# Patient Record
Sex: FEMALE | Race: WHITE | NOT HISPANIC OR LATINO | Employment: OTHER | URBAN - METROPOLITAN AREA
[De-identification: names, ages, dates, MRNs, and addresses within clinical notes are randomized per-mention and may not be internally consistent; named-entity substitution may affect disease eponyms.]

---

## 2017-02-01 RX ORDER — AMLODIPINE BESYLATE 5 MG/1
TABLET ORAL
Qty: 90 TABLET | Refills: 2 | Status: SHIPPED | OUTPATIENT
Start: 2017-02-01 | End: 2017-11-14 | Stop reason: SDUPTHER

## 2017-03-28 ENCOUNTER — TELEPHONE (OUTPATIENT)
Dept: SURGERY | Facility: CLINIC | Age: 62
End: 2017-03-28

## 2017-03-28 NOTE — TELEPHONE ENCOUNTER
Returned call to pt regarding F/U appointment, pt had imaging at [facility], pt advised to bring images on a disc with reports to appointment, appt scheduled per pt request, all questions answered at this time

## 2017-04-20 ENCOUNTER — HOSPITAL ENCOUNTER (OUTPATIENT)
Dept: RADIOLOGY | Facility: HOSPITAL | Age: 62
Discharge: HOME | End: 2017-04-20
Attending: SURGERY

## 2017-04-20 ENCOUNTER — OFFICE VISIT (OUTPATIENT)
Dept: SURGERY | Facility: CLINIC | Age: 62
End: 2017-04-20
Payer: MEDICARE

## 2017-04-20 VITALS
HEART RATE: 69 BPM | BODY MASS INDEX: 47.09 KG/M2 | SYSTOLIC BLOOD PRESSURE: 172 MMHG | DIASTOLIC BLOOD PRESSURE: 93 MMHG | HEIGHT: 66 IN | WEIGHT: 293 LBS | TEMPERATURE: 98 F

## 2017-04-20 DIAGNOSIS — R92.8 ABNORMAL MAMMOGRAM: Primary | ICD-10-CM

## 2017-04-20 PROCEDURE — 99999 PR PBB SHADOW E&M-EST. PATIENT-LVL III: CPT | Mod: PBBFAC,,, | Performed by: SURGERY

## 2017-04-20 PROCEDURE — 99213 OFFICE O/P EST LOW 20 MIN: CPT | Mod: PBBFAC,PO | Performed by: SURGERY

## 2017-04-20 PROCEDURE — 99213 OFFICE O/P EST LOW 20 MIN: CPT | Mod: S$PBB,,, | Performed by: SURGERY

## 2017-04-20 NOTE — PROGRESS NOTES
"Date of Service: 04/20/2017    HISTORY OF PRESENT ILLNESS:   Joy Singer is a 61 y.o. female initially seen 11/22/2016 for abnormal bilateral mammogram. She was recommended to pursue MRI, US, and genetics counseling; however, she reports that she returned to her original breast surgeon for further follow up. She now wishes to reestablish care her at [facility]. She denies skin change, nipple discharge, pain, or new breast lumps. She reports occasional burning sensation of her left lateral breast. An ultrasound in 12/2016 recommended follow up in 6 months.       MEDICATIONS/ALLERGIES:     Review of patient's allergies indicates:   Allergen Reactions    Percocet [oxycodone-acetaminophen] Itching       PHYSICAL EXAM:   BP (!) 172/93 (BP Location: Right arm, Patient Position: Sitting, BP Method: Automatic)  Pulse 69  Temp 98.3 °F (36.8 °C) (Oral)   Ht 5' 6" (1.676 m)  Wt (!) 170.6 kg (376 lb)  BMI 60.69 kg/m2  General: The patient appears well and is in no acute distress.   Neuro: Alert & oriented x3. HEENT: PERRLA, EOMI, sclerae nonicteric. Mucous membranes moist.   Cardiovascular: RRR, no g/r/m.  Breasts: The exam was done with the patient seated and supine. Left breast - within normal limits. No palpable masses and no abnormal skin or nipple findings. No supraclavicular or axillary lymphadenopathy on the left side.   Right breast - within normal limits. No palpable masses and no abnormal skin or nipple findings. No supraclavicular or axillary lymphadenopathy on the right side.  Pulmonary: clear to auscultation bilaterally   Abdomen: soft, nontender, nondistended. No masses.    Extremities: No clubbing or cyanosis. Bilateral full arm range of motion    ASSESSMENT:   This is a 61 y.o. female with history of abnormal mammogram who wishes to establish care     PLAN:     Images given to Radiology to interpret. If concerning abnormality noted, will plan for further workup. Otherwise, plan to follow up in June with " repeat imaging.

## 2017-04-24 DIAGNOSIS — N63.20 LEFT BREAST MASS: Primary | ICD-10-CM

## 2017-04-26 ENCOUNTER — TELEPHONE (OUTPATIENT)
Dept: SURGERY | Facility: CLINIC | Age: 62
End: 2017-04-26

## 2017-04-26 NOTE — TELEPHONE ENCOUNTER
Spoke with pt regarding outside interpretation of breast imaging and radiologist recommendations, pt scheduled for left mammogram and bilateral ultrasound at this time, appointment confirmed, all questions answered at this time

## 2017-04-27 ENCOUNTER — HOSPITAL ENCOUNTER (OUTPATIENT)
Dept: RADIOLOGY | Facility: HOSPITAL | Age: 62
Discharge: HOME | End: 2017-04-27
Attending: SURGERY
Payer: MEDICARE

## 2017-04-27 DIAGNOSIS — N63.10 BREAST MASS, RIGHT: ICD-10-CM

## 2017-04-27 DIAGNOSIS — N63.20 LEFT BREAST MASS: ICD-10-CM

## 2017-04-27 DIAGNOSIS — R92.8 ABNORMAL MAMMOGRAM: ICD-10-CM

## 2017-04-27 PROCEDURE — 76642 ULTRASOUND BREAST LIMITED: CPT | Mod: 26,RT,, | Performed by: RADIOLOGY

## 2017-04-27 PROCEDURE — 77061 BREAST TOMOSYNTHESIS UNI: CPT | Mod: 26,LT,, | Performed by: RADIOLOGY

## 2017-04-27 PROCEDURE — 76642 ULTRASOUND BREAST LIMITED: CPT | Mod: TC,RT

## 2017-04-27 PROCEDURE — 77065 DX MAMMO INCL CAD UNI: CPT | Mod: 26,LT,, | Performed by: RADIOLOGY

## 2017-04-27 PROCEDURE — 77061 BREAST TOMOSYNTHESIS UNI: CPT | Mod: TC,LT

## 2017-05-29 ENCOUNTER — OFFICE VISIT (OUTPATIENT)
Dept: OPTOMETRY | Facility: CLINIC | Age: 62
End: 2017-05-29
Payer: MEDICARE

## 2017-05-29 DIAGNOSIS — H10.829 ROSACEA CONJUNCTIVITIS: Primary | ICD-10-CM

## 2017-05-29 DIAGNOSIS — L71.8 ROSACEA CONJUNCTIVITIS: Primary | ICD-10-CM

## 2017-05-29 PROCEDURE — 99999 PR PBB SHADOW E&M-EST. PATIENT-LVL II: CPT | Mod: PBBFAC,,, | Performed by: OPTOMETRIST

## 2017-05-29 PROCEDURE — 92004 COMPRE OPH EXAM NEW PT 1/>: CPT | Mod: S$PBB,,, | Performed by: OPTOMETRIST

## 2017-05-29 PROCEDURE — 99212 OFFICE O/P EST SF 10 MIN: CPT | Mod: PBBFAC,PO | Performed by: OPTOMETRIST

## 2017-05-29 RX ORDER — BENZONATATE 200 MG/1
CAPSULE ORAL
Refills: 1 | COMMUNITY
Start: 2017-04-27 | End: 2017-10-05 | Stop reason: ALTCHOICE

## 2017-05-29 RX ORDER — FLUOROMETHOLONE 1 MG/ML
1 SUSPENSION/ DROPS OPHTHALMIC 4 TIMES DAILY
Qty: 5 ML | Refills: 1 | Status: SHIPPED | OUTPATIENT
Start: 2017-05-29 | End: 2017-06-08

## 2017-05-29 NOTE — PROGRESS NOTES
HPI     Pt states left eye is swelling and painful x 2 days. Pt states left eye   is sensitive to touch. Denies itching, redness, and discharge.   States va is stable no noticeable changes    No gtts       Last edited by Mary Cash on 5/29/2017 10:44 AM. (History)        ROS     Positive for: Neurological, Cardiovascular, Eyes, Respiratory    Negative for: Constitutional, Gastrointestinal, Skin, Genitourinary,   Musculoskeletal, HENT, Endocrine, Psychiatric, Allergic/Imm, Heme/Lymph    Last edited by Jareth Hernandez, OD on 5/29/2017 10:52 AM. (History)        Assessment /Plan     For exam results, see Encounter Report.    Rosacea conjunctivitis  -     fluorometholone 0.1% (FML) 0.1 % DrpS; Place 1 drop into the left eye 4 (four) times daily.  Dispense: 5 mL; Refill: 1      1. Ros conj OS (pt also uses CPAP for LIZETH)  2. Small Cats OU  3. Pseudotumor hx--on DIAMOX.  ONH wnl OU today    PLAN:    1. FML QID OS  2. SYS BAL or SOOTHE XP QID OD  3. rtc 1 week for FU.  If better will taper FML, switch to ATs OU, and discuss lid scrubs

## 2017-06-07 ENCOUNTER — OFFICE VISIT (OUTPATIENT)
Dept: OPTOMETRY | Facility: CLINIC | Age: 62
End: 2017-06-07
Payer: MEDICARE

## 2017-06-07 DIAGNOSIS — L71.8 ROSACEA CONJUNCTIVITIS: Primary | ICD-10-CM

## 2017-06-07 DIAGNOSIS — H10.829 ROSACEA CONJUNCTIVITIS: Primary | ICD-10-CM

## 2017-06-07 PROCEDURE — 99212 OFFICE O/P EST SF 10 MIN: CPT | Mod: PBBFAC,PO | Performed by: OPTOMETRIST

## 2017-06-07 PROCEDURE — 99999 PR PBB SHADOW E&M-EST. PATIENT-LVL II: CPT | Mod: PBBFAC,,, | Performed by: OPTOMETRIST

## 2017-06-07 PROCEDURE — 92012 INTRM OPH EXAM EST PATIENT: CPT | Mod: S$PBB,,, | Performed by: OPTOMETRIST

## 2017-06-07 NOTE — PROGRESS NOTES
HPI     DLS: 5/29/2017  Pt states very little swelling  Denies pain  Va is stable    FML os QID   Soothe od QID     Last edited by Mary Cash on 6/7/2017  8:37 AM. (History)        ROS     Positive for: Neurological, Cardiovascular, Eyes, Respiratory    Negative for: Constitutional, Gastrointestinal, Skin, Genitourinary,   Musculoskeletal, HENT, Endocrine, Psychiatric, Allergic/Imm, Heme/Lymph    Last edited by Jareth Henrandez, OD on 6/7/2017 11:35 AM. (History)        Assessment /Plan     For exam results, see Encounter Report.    Rosacea conjunctivitis      1. Ros conj OS (pt also uses CPAP for LIZETH)--resolved on meds  2. Small Cats OU  3. Pseudotumor hx--on DIAMOX.  ONH wnl OU today    PLAN:    1. Taper FML: BID OS X 1 week, then qAM X 1 week, then DC  2. Start SYS BAL or SOOTHE XP QID OU andDaily OCUSOFT lid scrubs  3. Pt gets quarterly eye exams from her ophth to monitor for PTC>  Will keep those appts as sched.  Will call me if rosacea sx recur on tx

## 2017-07-24 DIAGNOSIS — I10 ESSENTIAL HYPERTENSION: Primary | ICD-10-CM

## 2017-07-24 DIAGNOSIS — E78.00 PURE HYPERCHOLESTEROLEMIA: ICD-10-CM

## 2017-08-25 DIAGNOSIS — Z12.11 COLON CANCER SCREENING: ICD-10-CM

## 2017-10-02 ENCOUNTER — LAB VISIT (OUTPATIENT)
Dept: LAB | Facility: HOSPITAL | Age: 62
End: 2017-10-02
Attending: INTERNAL MEDICINE
Payer: MEDICARE

## 2017-10-02 DIAGNOSIS — I10 ESSENTIAL HYPERTENSION: ICD-10-CM

## 2017-10-02 DIAGNOSIS — E78.00 PURE HYPERCHOLESTEROLEMIA: ICD-10-CM

## 2017-10-02 LAB
ALT SERPL W/O P-5'-P-CCNC: 17 U/L
ANION GAP SERPL CALC-SCNC: 9 MMOL/L
AST SERPL-CCNC: 20 U/L
BUN SERPL-MCNC: 15 MG/DL
CALCIUM SERPL-MCNC: 9.5 MG/DL
CHLORIDE SERPL-SCNC: 111 MMOL/L
CHOLEST SERPL-MCNC: 164 MG/DL
CHOLEST/HDLC SERPL: 4.2 {RATIO}
CO2 SERPL-SCNC: 23 MMOL/L
CREAT SERPL-MCNC: 0.9 MG/DL
EST. GFR  (AFRICAN AMERICAN): >60 ML/MIN/1.73 M^2
EST. GFR  (NON AFRICAN AMERICAN): >60 ML/MIN/1.73 M^2
GLUCOSE SERPL-MCNC: 114 MG/DL
HDLC SERPL-MCNC: 39 MG/DL
HDLC SERPL: 23.8 %
LDLC SERPL CALC-MCNC: 99.2 MG/DL
NONHDLC SERPL-MCNC: 125 MG/DL
POTASSIUM SERPL-SCNC: 3.8 MMOL/L
SODIUM SERPL-SCNC: 143 MMOL/L
TRIGL SERPL-MCNC: 129 MG/DL

## 2017-10-02 PROCEDURE — 80048 BASIC METABOLIC PNL TOTAL CA: CPT

## 2017-10-02 PROCEDURE — 84460 ALANINE AMINO (ALT) (SGPT): CPT

## 2017-10-02 PROCEDURE — 36415 COLL VENOUS BLD VENIPUNCTURE: CPT | Mod: PO

## 2017-10-02 PROCEDURE — 80061 LIPID PANEL: CPT

## 2017-10-02 PROCEDURE — 84450 TRANSFERASE (AST) (SGOT): CPT

## 2017-10-05 ENCOUNTER — OFFICE VISIT (OUTPATIENT)
Dept: CARDIOLOGY | Facility: CLINIC | Age: 62
End: 2017-10-05
Payer: MEDICARE

## 2017-10-05 VITALS
BODY MASS INDEX: 47.09 KG/M2 | HEIGHT: 66 IN | SYSTOLIC BLOOD PRESSURE: 132 MMHG | WEIGHT: 293 LBS | HEART RATE: 66 BPM | DIASTOLIC BLOOD PRESSURE: 74 MMHG

## 2017-10-05 DIAGNOSIS — G47.33 OSA (OBSTRUCTIVE SLEEP APNEA): ICD-10-CM

## 2017-10-05 DIAGNOSIS — I10 ESSENTIAL HYPERTENSION: Primary | ICD-10-CM

## 2017-10-05 DIAGNOSIS — E78.00 PURE HYPERCHOLESTEROLEMIA: ICD-10-CM

## 2017-10-05 DIAGNOSIS — G93.2 PSEUDOTUMOR CEREBRI: ICD-10-CM

## 2017-10-05 DIAGNOSIS — E66.01 MORBID OBESITY WITH BODY MASS INDEX (BMI) OF 60.0 TO 69.9 IN ADULT: ICD-10-CM

## 2017-10-05 PROCEDURE — 99213 OFFICE O/P EST LOW 20 MIN: CPT | Mod: PBBFAC,PO | Performed by: INTERNAL MEDICINE

## 2017-10-05 PROCEDURE — 99999 PR PBB SHADOW E&M-EST. PATIENT-LVL III: CPT | Mod: PBBFAC,,, | Performed by: INTERNAL MEDICINE

## 2017-10-05 PROCEDURE — 99214 OFFICE O/P EST MOD 30 MIN: CPT | Mod: S$PBB,,, | Performed by: INTERNAL MEDICINE

## 2017-10-05 RX ORDER — PHENYLEPHRINE HCL 10 MG/1
10 TABLET, FILM COATED ORAL EVERY 4 HOURS PRN
COMMUNITY
End: 2018-05-15 | Stop reason: SINTOL

## 2017-10-05 RX ORDER — DIPHENHYDRAMINE HCL 25 MG
25 CAPSULE ORAL EVERY 6 HOURS PRN
COMMUNITY

## 2017-10-05 RX ORDER — NEOMYCIN SULFATE, POLYMYXIN B SULFATE AND HYDROCORTISONE 10; 3.5; 1 MG/ML; MG/ML; [USP'U]/ML
SUSPENSION/ DROPS AURICULAR (OTIC)
COMMUNITY
Start: 2017-10-03 | End: 2018-05-15 | Stop reason: CLARIF

## 2017-10-05 RX ORDER — PNV NO.95/FERROUS FUM/FOLIC AC 28MG-0.8MG
1000 TABLET ORAL DAILY
COMMUNITY

## 2017-10-05 NOTE — PATIENT INSTRUCTIONS
Consider gastric sleeve.  Call us to schedule a stress test if you decide to go ahead with surgery

## 2017-10-05 NOTE — PROGRESS NOTES
Subjective:   Patient ID:  Joy Singer is a 62 y.o. female who presents for follow-up of Shortness of Breath      Problem List:  HTN  Sleep apnea  Pseudotumor cerebri  BMI ~60    HPI:   Joy Singer reports that she gets short of breath easily - breathless if walking and talking at the same time. She is significantly obese. She started water aerobics recently. She had been swimming off and on. She had lost weight on [employer] in the past and rejoined 3 weeks ago.  and daughter started [employer] with her.   She had chest discomfort in 11/17 and went to the ER at . She was told that it was most likely musculoskeletal. She has back pain and was unable to turn easily in bed. She used to grab a rail on the head end of her bed with her arm to turn.   She has LIZETH. She started using CPAP 2-3 years ago.  On pravastatin 40 mg for hypercholesterolemia. LDL is ~100. Hepatic transaminases are within normal limits.   She takes acetazolamide for pseudotumor cerebri.       Review of Systems   Constitution: Positive for weight loss. Negative for weakness, malaise/fatigue and weight gain.   Cardiovascular: Positive for chest pain and dyspnea on exertion. Negative for claudication, irregular heartbeat, leg swelling, near-syncope, palpitations and syncope.   Respiratory: Negative for cough, hemoptysis, sputum production and wheezing.    Hematologic/Lymphatic: Does not bruise/bleed easily.   Musculoskeletal: Positive for arthritis, back pain and muscle cramps. Negative for joint pain, joint swelling and muscle weakness.   Gastrointestinal: Positive for heartburn (very seldom). Negative for abdominal pain, change in bowel habit and melena.   Genitourinary: Negative for frequency, hematuria and nocturia.   Neurological: Positive for headaches, numbness and paresthesias. Negative for dizziness, light-headedness and loss of balance.   Psychiatric/Behavioral: Negative for depression. The patient is not  nervous/anxious.        Current Outpatient Prescriptions   Medication Sig    acetaZOLAMIDE (DIAMOX) 250 MG tablet Take 250 mg by mouth 2 (two) times daily.    amlodipine (NORVASC) 5 MG tablet TAKE 1 TABLET DAILY    ascorbic acid (VITAMIN C WITH MARCOS HIPS) 500 mg TbSR Take by mouth once daily.    aspirin (ECOTRIN) 81 MG EC tablet Take 81 mg by mouth once daily.      aspirin-acetaminophen-caffeine 250-250-65 mg (EXCEDRIN EXTRA STRENGTH) 250-250-65 mg per tablet Take 1 tablet by mouth every 6 (six) hours as needed for Pain.    aspirin-caffeine (ANACIN) 400-32 mg Tab Take by mouth daily as needed.    calcium carbonate (OS-RUBÉN) 600 mg (1,500 mg) Tab Take 600 mg by mouth 2 (two) times daily with meals.      carisoprodol (SOMA) 350 MG tablet Take 350 mg by mouth continuous prn.      diphenhydrAMINE (BENADRYL) 25 mg capsule Take 25 mg by mouth every 6 (six) hours as needed for Itching.    docusate sodium (COLACE) 100 MG capsule Take 100 mg by mouth 2 (two) times daily.      hydrocodone-acetaminophen 7.5-325mg (NORCO) 7.5-325 mg per tablet TK 1 T PO Q 6 TO 8 H PRN    ketoconazole (EXTINA) 2 % Foam Apply topically continuous prn.      ketoconazole (NIZORAL) 2 % shampoo Apply topically twice a week.      MULTIVITAMIN W-MINERALS/LUTEIN (CENTRUM SILVER ORAL) Take by mouth.      neomycin-polymyxin-hydrocortisone (CORTISPORIN) 3.5-10,000-1 mg/mL-unit/mL-% otic suspension     phenylephrine (SUDAFED PE) 10 MG Tab Take 10 mg by mouth every 4 (four) hours as needed.    polyethylene glycol (GLYCOLAX) 17 gram/dose powder daily as needed.     pravastatin (PRAVACHOL) 40 MG tablet Take 1 tablet (40 mg total) by mouth once daily.    vitamin E 1000 UNIT capsule Take 1,000 Units by mouth once daily.    ibuprofen (ADVIL,MOTRIN) 600 MG tablet Take 600 mg by mouth every 8 (eight) hours as needed for Pain.         Social History   Substance Use Topics    Smoking status: Never Smoker    Smokeless tobacco: Never Used     "Alcohol use Yes      Comment: social         Objective:     Physical Exam   Constitutional: She is oriented to person, place, and time. She appears well-developed and well-nourished.   /74   Pulse 66   Ht 5' 6" (1.676 m)   Wt (!) 167.8 kg (369 lb 13.2 oz)   BMI 59.69 kg/m²      HENT:   Head: Normocephalic.   Neck: No JVD present. Carotid bruit is not present.   Cardiovascular: Normal rate, regular rhythm, S1 normal and S2 normal.  Exam reveals no gallop.    No murmur heard.  Pulses:       Posterior tibial pulses are 2+ on the right side, and 2+ on the left side.   Pulmonary/Chest: Effort normal. She has no wheezes. She has no rales. Chest wall is not dull to percussion.   Abdominal: Soft. She exhibits no abdominal bruit. There is no splenomegaly or hepatomegaly. There is no tenderness.   Musculoskeletal:        Right lower leg: She exhibits no edema.        Left lower leg: She exhibits no edema.   Neurological: She is alert and oriented to person, place, and time. Gait normal.   Skin: Skin is warm and dry. No bruising and no rash noted. No cyanosis. Nails show no clubbing.   Psychiatric: She has a normal mood and affect. Her speech is normal and behavior is normal. Judgment normal. Cognition and memory are normal.         Lab Results   Component Value Date    CHOL 164 10/02/2017    HDL 39 (L) 10/02/2017    LDLCALC 99.2 10/02/2017    TRIG 129 10/02/2017    CHOLHDL 23.8 10/02/2017     Lab Results   Component Value Date     (H) 10/02/2017    CREATININE 0.9 10/02/2017    BUN 15 10/02/2017     10/02/2017    K 3.8 10/02/2017     (H) 10/02/2017    CO2 23 10/02/2017     Lab Results   Component Value Date    ALT 17 10/02/2017    AST 20 10/02/2017    ALKPHOS 60 11/02/2016    BILITOT 0.3 11/02/2016           Assessment and Plan:     1. Essential hypertension    2. Pure hypercholesterolemia    3. LIZETH (obstructive sleep apnea)    4. Pseudotumor cerebri    5. Morbid obesity with body mass index (BMI) " of 60.0 to 69.9 in adult             Essential hypertension  -     2D Echo w/ Color Flow Doppler; Future    Pure hypercholesterolemia   Continue pravastatin.    LIZETH (obstructive sleep apnea)  -     Continue CPAP.    Pseudotumor cerebri  -     R/O Acromegaly. Ambulatory consult to Neurosurgery    Morbid obesity with body mass index (BMI) of 60.0 to 69.9 in adult   Recommend bariatric surgery     RTC 1 year

## 2017-11-14 ENCOUNTER — PATIENT MESSAGE (OUTPATIENT)
Dept: CARDIOLOGY | Facility: CLINIC | Age: 62
End: 2017-11-14

## 2017-11-14 RX ORDER — PRAVASTATIN SODIUM 40 MG/1
TABLET ORAL
Qty: 90 TABLET | Refills: 3 | Status: SHIPPED | OUTPATIENT
Start: 2017-11-14 | End: 2018-11-06 | Stop reason: SDUPTHER

## 2017-11-14 RX ORDER — AMLODIPINE BESYLATE 5 MG/1
5 TABLET ORAL DAILY
Qty: 90 TABLET | Refills: 3 | Status: SHIPPED | OUTPATIENT
Start: 2017-11-14 | End: 2018-11-26 | Stop reason: SDUPTHER

## 2017-12-18 ENCOUNTER — OFFICE VISIT (OUTPATIENT)
Dept: SURGERY | Facility: CLINIC | Age: 62
End: 2017-12-18
Payer: MEDICARE

## 2017-12-18 ENCOUNTER — HOSPITAL ENCOUNTER (OUTPATIENT)
Dept: RADIOLOGY | Facility: HOSPITAL | Age: 62
Discharge: HOME | End: 2017-12-18
Attending: SURGERY
Payer: MEDICARE

## 2017-12-18 VITALS
TEMPERATURE: 98 F | HEIGHT: 66 IN | DIASTOLIC BLOOD PRESSURE: 75 MMHG | BODY MASS INDEX: 47.09 KG/M2 | HEART RATE: 51 BPM | WEIGHT: 293 LBS | SYSTOLIC BLOOD PRESSURE: 134 MMHG | WEIGHT: 293 LBS | HEIGHT: 66 IN | BODY MASS INDEX: 47.09 KG/M2

## 2017-12-18 DIAGNOSIS — R92.8 ABNORMAL MAMMOGRAM: ICD-10-CM

## 2017-12-18 DIAGNOSIS — Z12.39 SCREENING FOR BREAST CANCER: Primary | ICD-10-CM

## 2017-12-18 PROCEDURE — 77066 DX MAMMO INCL CAD BI: CPT | Mod: TC,PO

## 2017-12-18 PROCEDURE — 77066 DX MAMMO INCL CAD BI: CPT | Mod: 26,,, | Performed by: RADIOLOGY

## 2017-12-18 PROCEDURE — 99999 PR PBB SHADOW E&M-EST. PATIENT-LVL III: CPT | Mod: PBBFAC,,, | Performed by: SURGERY

## 2017-12-18 PROCEDURE — 99213 OFFICE O/P EST LOW 20 MIN: CPT | Mod: S$PBB,,, | Performed by: SURGERY

## 2017-12-18 PROCEDURE — 77062 BREAST TOMOSYNTHESIS BI: CPT | Mod: 26,,, | Performed by: RADIOLOGY

## 2017-12-18 PROCEDURE — 99213 OFFICE O/P EST LOW 20 MIN: CPT | Mod: PBBFAC,PO | Performed by: SURGERY

## 2017-12-24 NOTE — PROGRESS NOTES
"Date of Service: 12/18/2017    HISTORY OF PRESENT ILLNESS:   Joy Singer is a 62 y.o. female initially seen 11/22/2016 for abnormal bilateral mammogram. She comes in for regular followup and breast exam.  Denies any new complaints.     MEDICATIONS/ALLERGIES:     Review of patient's allergies indicates:   Allergen Reactions    Percocet [oxycodone-acetaminophen] Itching       PHYSICAL EXAM:   /75 (BP Location: Right arm, Patient Position: Sitting, BP Method: Large (Automatic))   Pulse (!) 51   Temp 98.2 °F (36.8 °C) (Oral)   Ht 5' 6" (1.676 m)   Wt (!) 170.1 kg (375 lb)   BMI 60.53 kg/m²   General: The patient appears well and is in no acute distress.   Neuro: Alert & oriented x3. HEENT: PERRLA, EOMI, sclerae nonicteric. Mucous membranes moist.   Cardiovascular: RRR, no g/r/m.  Breasts: The exam was done with the patient seated and supine. Left breast - within normal limits. No palpable masses and no abnormal skin or nipple findings. No supraclavicular or axillary lymphadenopathy on the left side.   Right breast - within normal limits. No palpable masses and no abnormal skin or nipple findings. No supraclavicular or axillary lymphadenopathy on the right side.  Pulmonary: clear to auscultation bilaterally   Abdomen: soft, nontender, nondistended. No masses.    Extremities: No clubbing or cyanosis. Bilateral full arm range of motion    ASSESSMENT:   This is a 62 y.o. female with history of abnormal mammogram here for f/u   PLAN:   MMG negative BIRADS 2  F/u 1 year for CBE per patient wishes    "

## 2018-01-23 ENCOUNTER — OFFICE VISIT (OUTPATIENT)
Dept: SLEEP MEDICINE | Facility: CLINIC | Age: 63
End: 2018-01-23
Payer: MEDICARE

## 2018-01-23 VITALS
BODY MASS INDEX: 47.09 KG/M2 | HEART RATE: 67 BPM | DIASTOLIC BLOOD PRESSURE: 81 MMHG | SYSTOLIC BLOOD PRESSURE: 123 MMHG | WEIGHT: 293 LBS | HEIGHT: 66 IN

## 2018-01-23 DIAGNOSIS — G47.33 OSA (OBSTRUCTIVE SLEEP APNEA): Primary | ICD-10-CM

## 2018-01-23 PROCEDURE — 99999 PR PBB SHADOW E&M-EST. PATIENT-LVL III: CPT | Mod: PBBFAC,,, | Performed by: PSYCHIATRY & NEUROLOGY

## 2018-01-23 PROCEDURE — 99213 OFFICE O/P EST LOW 20 MIN: CPT | Mod: PBBFAC | Performed by: PSYCHIATRY & NEUROLOGY

## 2018-01-23 PROCEDURE — 99213 OFFICE O/P EST LOW 20 MIN: CPT | Mod: S$PBB,,, | Performed by: PSYCHIATRY & NEUROLOGY

## 2018-01-23 NOTE — PROGRESS NOTES
This 62 y.o. female patient returns for the management of obstructive sleep apnea. Referred by Dr. Hermosillo (cardiology) and Dr. Jerome, PMR, who is managing headaches.    The patient stated her previous DME does not accept her insurance. She is requesting for an order and her insurance to be sent to the DME provided below. She already confirmed that they accept her insurance.     [facility]   [phone]    Patient using CPAP nightly. She reports sleeping longer with CPAP  Snoring cessation  Some teeth grinding, some lingual protrusion at night, which is causing lower teeth shift per her dentist  Dry mouth not occuring with CPAP  Using a Wisp mask.    ESS = 3    CPAP interrogation: Auto CPAP set at 14-20 cm; 90% tile is 15.6; AHI <1; leak 1%; 7:36 hours/night 30-day; 30/30 >4 hours;     Home Sleep Study: 12/11/14: +LIZETH, AHI 15, %time <90% = 9.9%    PRIOR SLEEP HISTORY:   reports that she has intermittent breathing interruption in her sleep  Snoring.  Wakes up with dry mouth - which gives headaches  Bathroom every 2 hours.  Groggy in morning  Morning headaches after mouth breathing  ESS = 3  Denies limb kicking or RLS.  Left arm pain - contributes to sleep disturbances  Soma and Vicodin for back pain; knee pain    SLEEP ROUTINE:   Bed partner:    Time to bed: MN   Sleep onset latency: 10-15 mins   Disruptions or awakenings: every 2 hours due to bathroom   Wakeup time: 8:30-9 am   Perceived sleep quality: poor   Daytime naps: sometimes      Past Medical History:   Diagnosis Date    Carotid artery occlusion     Chronic back pain     HA (headache)     Hyperlipidemia     Morbid obesity with BMI of 60.0-69.9, adult     Obstructive sleep apnea (adult) (pediatric)        Past Surgical History:   Procedure Laterality Date    BLADDER SUSPENSION  2004    BREAST BIOPSY Bilateral 2003 and 2011    Core bx's, benign    CARPAL TUNNEL RELEASE  2000    COLONOSCOPY  2008 and 2011     "eye growth removal  2010    FRACTURE SURGERY      HYSTERECTOMY  2004    rotator cuff surgery  2011    left shoulder    toselectomy  1962       Family History   Problem Relation Age of Onset    Emphysema Mother     Lung cancer Mother     Hypertension Father     Heart failure Father     Diabetes Father     Emphysema Father     Heart failure Sister     Valvular heart disease Sister     Breast cancer Maternal Aunt     Breast cancer Maternal Cousin     Breast cancer Maternal Cousin        Social History   Substance Use Topics    Smoking status: Never Smoker    Smokeless tobacco: Never Used    Alcohol use Yes      Comment: social       ALLERGIES: Reviewed in EPIC    CURRENT MEDICATIONS: Reviewed in EPIC    REVIEW OF SYSTEMS:  Sleep related symptoms as per HPI;   Weight down of 30 lbs since last visit.   Morning headaches, improved on CPAP;   Otherwise, a balance of systems reviewed is negative.    PHYSICAL EXAM:  Ht 5' 6" (1.676 m)   Wt (!) 156 kg (343 lb 14.7 oz)   BMI 55.51 kg/m²   GENERAL: Well groomed; Normally developed; Obese      ASSESSMENT:    1. Obstructive Sleep Apnea, moderate by AHI criteria on home sleep study. Symptoms resolved, including headahces. Excellent adherence. Weight back down         2. Sleep disturbances NEC - back pain    PLAN:    Treatment:  1. Continue auto CPAP 14 - 20 cm with OHME  2. Patient counselled on weight loss and the effects of excess weight on LIZETH and CPAP management: Formal weight loss program is enrolled with weight watchers  3. CPAP still within range to accommodate her needs. Predicted AHI <5    [facility] = [facility]     Precautions: The patient was advised to abstain from driving should they feel sleepy or drowsy.    Follow up: 12 months. MD/NP  "

## 2018-01-23 NOTE — PROGRESS NOTES
Patient, Joy Singer (MRN #[id number]), presented with a recorded BMI of 55.51 kg/m^2 consistent with the definition of morbid obesity (ICD-10 E66.01). The patient's morbid obesity was monitored, evaluated, addressed and/or treated. This addendum to the medical record is made on 01/23/2018.

## 2018-04-18 ENCOUNTER — LAB VISIT (OUTPATIENT)
Dept: LAB | Facility: HOSPITAL | Age: 63
End: 2018-04-18
Attending: PSYCHIATRY & NEUROLOGY
Payer: MEDICARE

## 2018-04-18 DIAGNOSIS — E66.01 MORBID OBESITY DUE TO EXCESS CALORIES: ICD-10-CM

## 2018-04-18 DIAGNOSIS — I10 ESSENTIAL HYPERTENSION: ICD-10-CM

## 2018-04-18 DIAGNOSIS — G44.85 STABBING HEADACHE: ICD-10-CM

## 2018-04-18 DIAGNOSIS — E66.01 MORBID OBESITY: Primary | ICD-10-CM

## 2018-04-18 LAB
ALBUMIN SERPL BCP-MCNC: 3.5 G/DL
ALBUMIN SERPL BCP-MCNC: 3.5 G/DL
ALP SERPL-CCNC: 59 U/L
ALP SERPL-CCNC: 59 U/L
ALT SERPL W/O P-5'-P-CCNC: 17 U/L
ALT SERPL W/O P-5'-P-CCNC: 17 U/L
ANION GAP SERPL CALC-SCNC: 9 MMOL/L
ANION GAP SERPL CALC-SCNC: 9 MMOL/L
AST SERPL-CCNC: 22 U/L
AST SERPL-CCNC: 22 U/L
BILIRUB SERPL-MCNC: 0.3 MG/DL
BILIRUB SERPL-MCNC: 0.3 MG/DL
BUN SERPL-MCNC: 19 MG/DL
BUN SERPL-MCNC: 19 MG/DL
CALCIUM SERPL-MCNC: 9.4 MG/DL
CALCIUM SERPL-MCNC: 9.4 MG/DL
CHLORIDE SERPL-SCNC: 113 MMOL/L
CHLORIDE SERPL-SCNC: 113 MMOL/L
CO2 SERPL-SCNC: 22 MMOL/L
CO2 SERPL-SCNC: 22 MMOL/L
CREAT SERPL-MCNC: 0.9 MG/DL
CREAT SERPL-MCNC: 0.9 MG/DL
EST. GFR  (AFRICAN AMERICAN): >60 ML/MIN/1.73 M^2
EST. GFR  (AFRICAN AMERICAN): >60 ML/MIN/1.73 M^2
EST. GFR  (NON AFRICAN AMERICAN): >60 ML/MIN/1.73 M^2
EST. GFR  (NON AFRICAN AMERICAN): >60 ML/MIN/1.73 M^2
GLUCOSE SERPL-MCNC: 100 MG/DL
GLUCOSE SERPL-MCNC: 100 MG/DL
POTASSIUM SERPL-SCNC: 3.6 MMOL/L
POTASSIUM SERPL-SCNC: 3.6 MMOL/L
PROT SERPL-MCNC: 6.8 G/DL
PROT SERPL-MCNC: 6.8 G/DL
SODIUM SERPL-SCNC: 144 MMOL/L
SODIUM SERPL-SCNC: 144 MMOL/L

## 2018-04-18 PROCEDURE — 80053 COMPREHEN METABOLIC PANEL: CPT

## 2018-04-18 PROCEDURE — 36415 COLL VENOUS BLD VENIPUNCTURE: CPT | Mod: PO

## 2018-05-15 ENCOUNTER — OFFICE VISIT (OUTPATIENT)
Dept: INTERNAL MEDICINE | Facility: CLINIC | Age: 63
End: 2018-05-15
Payer: MEDICARE

## 2018-05-15 VITALS
DIASTOLIC BLOOD PRESSURE: 80 MMHG | RESPIRATION RATE: 16 BRPM | HEART RATE: 60 BPM | HEIGHT: 66 IN | SYSTOLIC BLOOD PRESSURE: 149 MMHG | BODY MASS INDEX: 47.09 KG/M2 | WEIGHT: 293 LBS | TEMPERATURE: 98 F

## 2018-05-15 DIAGNOSIS — R73.09 ELEVATED RANDOM BLOOD GLUCOSE LEVEL: ICD-10-CM

## 2018-05-15 DIAGNOSIS — E78.00 PURE HYPERCHOLESTEROLEMIA: ICD-10-CM

## 2018-05-15 DIAGNOSIS — R79.89 ABNORMAL CBC: ICD-10-CM

## 2018-05-15 DIAGNOSIS — G47.33 OSA (OBSTRUCTIVE SLEEP APNEA): ICD-10-CM

## 2018-05-15 DIAGNOSIS — Z00.00 ANNUAL PHYSICAL EXAM: ICD-10-CM

## 2018-05-15 DIAGNOSIS — I10 ESSENTIAL HYPERTENSION: Primary | ICD-10-CM

## 2018-05-15 DIAGNOSIS — E66.01 MORBID OBESITY WITH BODY MASS INDEX (BMI) OF 60.0 TO 69.9 IN ADULT: ICD-10-CM

## 2018-05-15 DIAGNOSIS — G93.2 PSEUDOTUMOR CEREBRI: ICD-10-CM

## 2018-05-15 DIAGNOSIS — Z11.59 NEED FOR HEPATITIS C SCREENING TEST: ICD-10-CM

## 2018-05-15 PROCEDURE — 99999 PR PBB SHADOW E&M-EST. PATIENT-LVL III: CPT | Mod: PBBFAC,,, | Performed by: INTERNAL MEDICINE

## 2018-05-15 PROCEDURE — 99213 OFFICE O/P EST LOW 20 MIN: CPT | Mod: PBBFAC,PO | Performed by: INTERNAL MEDICINE

## 2018-05-15 PROCEDURE — 99214 OFFICE O/P EST MOD 30 MIN: CPT | Mod: S$PBB,,, | Performed by: INTERNAL MEDICINE

## 2018-05-15 RX ORDER — ETODOLAC 400 MG/1
TABLET, FILM COATED ORAL
COMMUNITY
End: 2018-05-15 | Stop reason: SDUPTHER

## 2018-05-15 RX ORDER — POLYETHYLENE GLYCOL 3350 17 G/17G
17 POWDER, FOR SOLUTION ORAL DAILY
COMMUNITY

## 2018-05-15 RX ORDER — ASPIRIN 81 MG/1
TABLET ORAL
COMMUNITY
End: 2020-10-06

## 2018-05-15 RX ORDER — DOCUSATE SODIUM 100 MG/1
CAPSULE, LIQUID FILLED ORAL
COMMUNITY

## 2018-05-15 RX ORDER — KETOCONAZOLE 2 G/100G
AEROSOL, FOAM TOPICAL
COMMUNITY

## 2018-05-15 RX ORDER — INDOMETHACIN 25 MG/1
25 CAPSULE ORAL 3 TIMES DAILY
COMMUNITY
End: 2018-11-01

## 2018-05-15 RX ORDER — BIOTIN 5 MG
CAPSULE ORAL
COMMUNITY

## 2018-05-15 NOTE — PROGRESS NOTES
Subjective:      Joy Singer is a 62 y.o. female who presents for annual exam.    HTN- BP elevated today, reports compliant with amlodipine, denies chest pain, no palpitations, no shortness of breath.     OA- chronic low back pain, knee pain, hip pain, sees orthopedics as needed, controlled with norco.    HLD- takes pravastatin, denies muscle cramps or muscle pain.    Dermatology- Dr. Zavaleta  GI- Dr. Wong  Orthopedics- Dr. Armendariz  ENT- Dr. Campainttres  Neuroophthalmology- Dr. West  Neurology- Dr. Vincent    Family History:  family history includes Breast cancer in her maternal aunt, maternal cousin, and maternal cousin; Diabetes in her father; Emphysema in her father and mother; Heart failure in her father and sister; Hypertension in her father; Lung cancer in her mother; Valvular heart disease in her sister.    Health Maintenance:  Health Maintenance       Date Due Completion Date    Hepatitis C Screening 1955 ---    Zoster Vaccine 07/23/2016 ---    Influenza Vaccine 08/01/2018 ---    Mammogram 12/18/2019 12/18/2017    Lipid Panel 10/02/2022 10/2/2017    TETANUS VACCINE 11/08/2026 11/8/2016    Colonoscopy 12/12/2026 12/12/2016        Eye exam: 6/2017  MMG: bilateral due 12/2017  Td: 11/2016  Hepatitis C due    Exercise: minimal  Diet: weight watchers  Body mass index is 57 kg/m².    Meds:   Current Outpatient Prescriptions:     acetaZOLAMIDE (DIAMOX) 250 MG tablet, Take 250 mg by mouth 2 (two) times daily., Disp: , Rfl:     amLODIPine (NORVASC) 5 MG tablet, Take 1 tablet (5 mg total) by mouth once daily., Disp: 90 tablet, Rfl: 3    ascorbic acid (VITAMIN C WITH MARCOS HIPS) 500 mg TbSR, Take by mouth once daily., Disp: , Rfl:     aspirin-acetaminophen-caffeine 250-250-65 mg (EXCEDRIN EXTRA STRENGTH) 250-250-65 mg per tablet, Take 1 tablet by mouth every 6 (six) hours as needed for Pain., Disp: , Rfl:     calcium acetate/aluminum sulf ([place]O TOP), [place]  twice a day, Disp: , Rfl:      calcium carbonate-vitamin D3 600 mg(1,500mg) -500 unit Cap, Calcium 600 with Vitamin D3 600 mg (1,500 mg)-500 unit capsule  Take 1 capsule twice a day by oral route., Disp: , Rfl:     carisoprodol (SOMA) 350 MG tablet, Take 350 mg by mouth continuous prn.  , Disp: , Rfl:     diphenhydrAMINE (BENADRYL) 25 mg capsule, Take 25 mg by mouth every 6 (six) hours as needed for Itching., Disp: , Rfl:     hydrocodone-acetaminophen 7.5-325mg (NORCO) 7.5-325 mg per tablet, TK 1 T PO Q 6 TO 8 H PRN, Disp: , Rfl: 0    indomethacin (INDOCIN) 25 MG capsule, Take 25 mg by mouth 3 (three) times daily., Disp: , Rfl:     ketoconazole (EXTINA) 2 % Foam, Apply topically continuous prn.  , Disp: , Rfl:     MULTIVITAMIN W-MINERALS/LUTEIN (CENTRUM SILVER ORAL), Take by mouth.  , Disp: , Rfl:     pravastatin (PRAVACHOL) 40 MG tablet, TAKE 1 TABLET DAILY, Disp: 90 tablet, Rfl: 3    vitamin E 1000 UNIT capsule, Take 1,000 Units by mouth once daily., Disp: , Rfl:     aspirin ([facility] ASPIRIN) 81 MG EC tablet, [place] Aspirin 81 mg tablet,delayed release  Take 1 tablet every day by oral route., Disp: , Rfl:     docusate sodium (COLACE) 100 MG capsule, Colace 100 mg capsule  Take 1 capsule twice a day by oral route., Disp: , Rfl:     ibuprofen (ADVIL,MOTRIN) 600 MG tablet, Take 600 mg by mouth every 8 (eight) hours as needed for Pain., Disp: , Rfl:     ketoconazole (EXTINA) 2 % Foam, Extina 2 % topical foam, Disp: , Rfl:     polyethylene glycol (GLYCOLAX) 17 gram/dose powder, polyethylene glycol 3350 17 gram/dose oral powder, Disp: , Rfl:     PMHx:   Past Medical History:   Diagnosis Date    Carotid artery occlusion     Chronic back pain     HA (headache)     Hyperlipidemia     Morbid obesity with BMI of 60.0-69.9, adult     Obstructive sleep apnea (adult) (pediatric)        PSHx:     Past Surgical History:   Procedure Laterality Date    BLADDER SUSPENSION  2004    BREAST BIOPSY Bilateral 2003 and 2011    Core bx's,  benign    CARPAL TUNNEL RELEASE  2000    COLONOSCOPY  2008 and 2011    eye growth removal  2010    FRACTURE SURGERY      HYSTERECTOMY  2004    rotator cuff surgery  2011    left shoulder    toselectomy  1962       SocHx:   Social History     Social History    Marital status:      Spouse name: N/A    Number of children: N/A    Years of education: N/A     Occupational History    Retired       Social History Main Topics    Smoking status: Never Smoker    Smokeless tobacco: Never Used    Alcohol use Yes      Comment: social    Drug use: No    Sexual activity: Yes     Partners: Male      Comment: socially     Other Topics Concern    None     Social History Narrative    She is caregiver for her        Review of Systems   Constitutional: Negative for chills, diaphoresis and fever.   HENT: Negative for congestion, dental problem, ear pain, postnasal drip, rhinorrhea, sinus pressure and sore throat.    Eyes: Negative for redness and visual disturbance.   Respiratory: Negative for cough, chest tightness and shortness of breath.    Cardiovascular: Negative for chest pain, palpitations and leg swelling.   Gastrointestinal: Positive for constipation (relieved by Miralax). Negative for abdominal pain, blood in stool, diarrhea, nausea and vomiting.   Endocrine: Negative for cold intolerance and heat intolerance.   Genitourinary: Negative for decreased urine volume, dysuria, hematuria and urgency.   Musculoskeletal: Positive for arthralgias (hip pains), back pain and myalgias (controlled with soma).   Skin: Negative for rash.        Itchy scalp   Neurological: Negative for dizziness, weakness, light-headedness and headaches.   Hematological: Negative for adenopathy.   Psychiatric/Behavioral: Negative for dysphoric mood and sleep disturbance.       Objective:      Physical Exam   Constitutional: She is oriented to person, place, and time. Vital signs are normal. She appears well-developed and  well-nourished. No distress.   HENT:   Head: Normocephalic and atraumatic.   Right Ear: Hearing, tympanic membrane, external ear and ear canal normal. Tympanic membrane is not erythematous and not bulging.   Left Ear: Hearing, tympanic membrane, external ear and ear canal normal. Tympanic membrane is not erythematous and not bulging.   Nose: Nose normal.   Mouth/Throat: Uvula is midline, oropharynx is clear and moist and mucous membranes are normal. No oropharyngeal exudate or posterior oropharyngeal erythema.   Eyes: Conjunctivae, EOM and lids are normal. Pupils are equal, round, and reactive to light. No scleral icterus.   Neck: Normal range of motion. Neck supple. No thyroid mass and no thyromegaly present.   Cardiovascular: Normal rate, regular rhythm, normal heart sounds and intact distal pulses.    No murmur heard.  Pulses:       Posterior tibial pulses are 2+ on the right side, and 2+ on the left side.   Pulmonary/Chest: Effort normal and breath sounds normal. She has no wheezes.   Abdominal: Soft. Bowel sounds are normal. She exhibits no distension. There is no tenderness. There is no rigidity, no rebound and no guarding.   Musculoskeletal: Normal range of motion. She exhibits no edema.        Cervical back: She exhibits no bony tenderness and no pain.        Thoracic back: She exhibits no bony tenderness and no pain.        Lumbar back: She exhibits no bony tenderness and no pain.        Right foot: There is normal range of motion.        Left foot: There is normal range of motion.   Feet:   Right Foot:   Protective Sensation: 5 sites tested. 5 sites sensed.   Skin Integrity: Negative for ulcer, erythema or callus.   Left Foot:   Protective Sensation: 5 sites tested. 5 sites sensed.   Skin Integrity: Negative for ulcer, skin breakdown, erythema or callus.   Lymphadenopathy:     She has no cervical adenopathy.        Right: No supraclavicular adenopathy present.        Left: No supraclavicular adenopathy  present.   Neurological: She is alert and oriented to person, place, and time. She has normal reflexes. Coordination and gait normal.   Skin: Skin is warm, dry and intact. No rash noted. She is not diaphoretic.   Psychiatric: She has a normal mood and affect.   Vitals reviewed.      Assessment:       1. Essential hypertension    2. Pure hypercholesterolemia    3. Pseudotumor cerebri    4. LIZETH (obstructive sleep apnea)    5. Annual physical exam    6. Morbid obesity with body mass index (BMI) of 60.0 to 69.9 in adult    7. Abnormal CBC    8. Body mass index (BMI) of 50-59.9 in adult     9. Elevated random blood glucose level    10. Need for hepatitis C screening test        Plan:       1. Essential hypertension  - elevated bp today, will recheck in 1 month    2. Pure hypercholesterolemia  - Lipid panel; Future  - continue pravachol    3. Pseudotumor cerebri  - stable, managed by Neurology    4. LIZETH (obstructive sleep apnea)  - compliant with cpap    5. Annual physical exam  - CBC auto differential; Future  - Comprehensive metabolic panel; Future  - Lipid panel; Future  - TSH; Future  - Urinalysis; Future  - Vitamin D; Future  - will obtain old records    6. Morbid obesity with body mass index (BMI) of 60.0 to 69.9 in adult  - T3, free; Future  - T4, free; Future    7. Abnormal CBC  - CBC auto differential; Future    8. Body mass index (BMI) of 50-59.9 in adult   - Vitamin D; Future    9. Elevated random blood glucose level  - Hemoglobin A1c; Future    10. Need for hepatitis C screening test  - Hepatitis C antibody; Future      RTC in 1 month or sooner if needed    Faiza Kuhn MD

## 2018-05-16 ENCOUNTER — LAB VISIT (OUTPATIENT)
Dept: LAB | Facility: HOSPITAL | Age: 63
End: 2018-05-16
Attending: INTERNAL MEDICINE
Payer: MEDICARE

## 2018-05-16 DIAGNOSIS — Z11.59 NEED FOR HEPATITIS C SCREENING TEST: ICD-10-CM

## 2018-05-16 DIAGNOSIS — I10 ESSENTIAL (PRIMARY) HYPERTENSION: ICD-10-CM

## 2018-05-16 DIAGNOSIS — I10 ESSENTIAL HYPERTENSION: ICD-10-CM

## 2018-05-16 DIAGNOSIS — Z00.00 ANNUAL PHYSICAL EXAM: ICD-10-CM

## 2018-05-16 DIAGNOSIS — R73.09 ELEVATED RANDOM BLOOD GLUCOSE LEVEL: ICD-10-CM

## 2018-05-16 DIAGNOSIS — R79.89 ABNORMAL CBC: ICD-10-CM

## 2018-05-16 DIAGNOSIS — E66.01 MORBID OBESITY WITH BODY MASS INDEX (BMI) OF 60.0 TO 69.9 IN ADULT: ICD-10-CM

## 2018-05-16 DIAGNOSIS — E78.00 PURE HYPERCHOLESTEROLEMIA: ICD-10-CM

## 2018-05-16 LAB
25(OH)D3+25(OH)D2 SERPL-MCNC: 27 NG/ML
ALBUMIN SERPL BCP-MCNC: 3.5 G/DL
ALP SERPL-CCNC: 69 U/L
ALT SERPL W/O P-5'-P-CCNC: 20 U/L
ANION GAP SERPL CALC-SCNC: 7 MMOL/L
AST SERPL-CCNC: 24 U/L
BASOPHILS # BLD AUTO: 0.07 K/UL
BASOPHILS NFR BLD: 0.9 %
BILIRUB SERPL-MCNC: 0.4 MG/DL
BUN SERPL-MCNC: 21 MG/DL
CALCIUM SERPL-MCNC: 8.9 MG/DL
CHLORIDE SERPL-SCNC: 115 MMOL/L
CHOLEST SERPL-MCNC: 149 MG/DL
CHOLEST/HDLC SERPL: 3.5 {RATIO}
CO2 SERPL-SCNC: 23 MMOL/L
CREAT SERPL-MCNC: 0.8 MG/DL
DIFFERENTIAL METHOD: ABNORMAL
EOSINOPHIL # BLD AUTO: 0.2 K/UL
EOSINOPHIL NFR BLD: 3 %
ERYTHROCYTE [DISTWIDTH] IN BLOOD BY AUTOMATED COUNT: 15.1 %
EST. GFR  (AFRICAN AMERICAN): >60 ML/MIN/1.73 M^2
EST. GFR  (NON AFRICAN AMERICAN): >60 ML/MIN/1.73 M^2
ESTIMATED AVG GLUCOSE: 108 MG/DL
GLUCOSE SERPL-MCNC: 107 MG/DL
HBA1C MFR BLD HPLC: 5.4 %
HCT VFR BLD AUTO: 44.3 %
HDLC SERPL-MCNC: 42 MG/DL
HDLC SERPL: 28.2 %
HGB BLD-MCNC: 13.4 G/DL
IMM GRANULOCYTES # BLD AUTO: 0.03 K/UL
IMM GRANULOCYTES NFR BLD AUTO: 0.4 %
LDLC SERPL CALC-MCNC: 90.6 MG/DL
LYMPHOCYTES # BLD AUTO: 2.2 K/UL
LYMPHOCYTES NFR BLD: 29.1 %
MCH RBC QN AUTO: 25.7 PG
MCHC RBC AUTO-ENTMCNC: 30.2 G/DL
MCV RBC AUTO: 85 FL
MONOCYTES # BLD AUTO: 0.6 K/UL
MONOCYTES NFR BLD: 7.3 %
NEUTROPHILS # BLD AUTO: 4.5 K/UL
NEUTROPHILS NFR BLD: 59.3 %
NONHDLC SERPL-MCNC: 107 MG/DL
NRBC BLD-RTO: 0 /100 WBC
PLATELET # BLD AUTO: 145 K/UL
PMV BLD AUTO: 12.4 FL
POTASSIUM SERPL-SCNC: 3.8 MMOL/L
PROT SERPL-MCNC: 6.7 G/DL
RBC # BLD AUTO: 5.21 M/UL
SODIUM SERPL-SCNC: 145 MMOL/L
T3FREE SERPL-MCNC: 2.8 PG/ML
T4 FREE SERPL-MCNC: 1.03 NG/DL
TRIGL SERPL-MCNC: 82 MG/DL
TSH SERPL DL<=0.005 MIU/L-ACNC: 1.83 UIU/ML
WBC # BLD AUTO: 7.66 K/UL

## 2018-05-16 PROCEDURE — 80061 LIPID PANEL: CPT

## 2018-05-16 PROCEDURE — 82306 VITAMIN D 25 HYDROXY: CPT

## 2018-05-16 PROCEDURE — 36415 COLL VENOUS BLD VENIPUNCTURE: CPT | Mod: PO

## 2018-05-16 PROCEDURE — 84443 ASSAY THYROID STIM HORMONE: CPT

## 2018-05-16 PROCEDURE — 84439 ASSAY OF FREE THYROXINE: CPT

## 2018-05-16 PROCEDURE — 85025 COMPLETE CBC W/AUTO DIFF WBC: CPT

## 2018-05-16 PROCEDURE — 83036 HEMOGLOBIN GLYCOSYLATED A1C: CPT

## 2018-05-16 PROCEDURE — 80053 COMPREHEN METABOLIC PANEL: CPT

## 2018-05-16 PROCEDURE — 84481 FREE ASSAY (FT-3): CPT

## 2018-05-16 PROCEDURE — 86803 HEPATITIS C AB TEST: CPT

## 2018-05-17 LAB — HCV AB SERPL QL IA: NEGATIVE

## 2018-05-21 PROBLEM — E55.9 VITAMIN D INSUFFICIENCY: Status: ACTIVE | Noted: 2018-05-21

## 2018-05-21 PROBLEM — D69.6 THROMBOCYTOPENIA: Status: ACTIVE | Noted: 2018-05-21

## 2018-05-31 ENCOUNTER — TELEPHONE (OUTPATIENT)
Dept: SURGERY | Facility: CLINIC | Age: 63
End: 2018-05-31

## 2018-05-31 NOTE — TELEPHONE ENCOUNTER
Pt c/o right breat pain, pt c/o 5/10 constant deep ache/buring to right breast, denies redness/warmth/swelling or nipple discharge at this time, pt requesting appt, appt scheduled and confirmed

## 2018-06-04 ENCOUNTER — OFFICE VISIT (OUTPATIENT)
Dept: SURGERY | Facility: CLINIC | Age: 63
End: 2018-06-04
Payer: MEDICARE

## 2018-06-04 VITALS
BODY MASS INDEX: 45.99 KG/M2 | SYSTOLIC BLOOD PRESSURE: 158 MMHG | HEART RATE: 52 BPM | TEMPERATURE: 98 F | HEIGHT: 67 IN | WEIGHT: 293 LBS | DIASTOLIC BLOOD PRESSURE: 79 MMHG

## 2018-06-04 DIAGNOSIS — N64.4 BREAST PAIN, RIGHT: Primary | ICD-10-CM

## 2018-06-04 PROCEDURE — 99999 PR PBB SHADOW E&M-EST. PATIENT-LVL III: CPT | Mod: PBBFAC,,, | Performed by: SURGERY

## 2018-06-04 PROCEDURE — 99213 OFFICE O/P EST LOW 20 MIN: CPT | Mod: S$PBB,,, | Performed by: SURGERY

## 2018-06-04 PROCEDURE — 99213 OFFICE O/P EST LOW 20 MIN: CPT | Mod: PBBFAC,PO | Performed by: SURGERY

## 2018-06-04 RX ORDER — DICLOFENAC SODIUM 10 MG/G
2 GEL TOPICAL 3 TIMES DAILY
Qty: 1 TUBE | Refills: 0 | Status: SHIPPED | OUTPATIENT
Start: 2018-06-04 | End: 2019-03-26

## 2018-06-07 ENCOUNTER — HOSPITAL ENCOUNTER (OUTPATIENT)
Dept: RADIOLOGY | Facility: HOSPITAL | Age: 63
Discharge: HOME | End: 2018-06-07
Attending: SURGERY
Payer: MEDICARE

## 2018-06-07 DIAGNOSIS — R92.8 ABNORMAL MAMMOGRAM: ICD-10-CM

## 2018-06-07 DIAGNOSIS — N64.4 BREAST PAIN, RIGHT: ICD-10-CM

## 2018-06-07 PROCEDURE — 77061 BREAST TOMOSYNTHESIS UNI: CPT | Mod: TC,PO

## 2018-06-07 PROCEDURE — 76642 ULTRASOUND BREAST LIMITED: CPT | Mod: TC,PO,RT

## 2018-06-07 PROCEDURE — 77061 BREAST TOMOSYNTHESIS UNI: CPT | Mod: 26,,, | Performed by: RADIOLOGY

## 2018-06-07 PROCEDURE — 77065 DX MAMMO INCL CAD UNI: CPT | Mod: 26,,, | Performed by: RADIOLOGY

## 2018-06-07 PROCEDURE — 77065 DX MAMMO INCL CAD UNI: CPT | Mod: TC,PO

## 2018-06-07 PROCEDURE — 76642 ULTRASOUND BREAST LIMITED: CPT | Mod: 26,RT,, | Performed by: RADIOLOGY

## 2018-06-10 NOTE — PROGRESS NOTES
"Date of Service: 6/4/2018    HISTORY OF PRESENT ILLNESS:   Joy Singer is a 62 y.o. female initially seen 11/22/2016 for abnormal bilateral mammogram. Report some right breast pain and burning lateral and superiorly.      MEDICATIONS/ALLERGIES:     Review of patient's allergies indicates:   Allergen Reactions    Percocet [oxycodone-acetaminophen] Itching       PHYSICAL EXAM:   BP (!) 158/79 (BP Location: Right arm, Patient Position: Sitting, BP Method: Large (Automatic))   Pulse (!) 52   Temp 97.9 °F (36.6 °C) (Oral)   Ht 5' 7" (1.702 m)   Wt (!) 160.1 kg (353 lb)   BMI 55.29 kg/m²   General: The patient appears well and is in no acute distress.   Neuro: Alert & oriented x3. HEENT: PERRLA, EOMI, sclerae nonicteric. Mucous membranes moist.   Cardiovascular: RRR, no g/r/m.  Breasts: The exam was done with the patient seated and supine. Left breast - within normal limits. No palpable masses and no abnormal skin or nipple findings. No supraclavicular or axillary lymphadenopathy on the left side.   Right breast - within normal limits. No palpable masses and no abnormal skin or nipple findings. No supraclavicular or axillary lymphadenopathy on the right side.  Pulmonary: clear to auscultation bilaterally   Abdomen: soft, nontender, nondistended. No masses.    Extremities: No clubbing or cyanosis. Bilateral full arm range of motion    ASSESSMENT:   This is a 62 y.o. female with right breast pain  PLAN:     Reassurance given  Discussed diclofenac cream.  Will get MMG and u/s to rule out any pathology.  "

## 2018-09-17 ENCOUNTER — TELEPHONE (OUTPATIENT)
Dept: INTERNAL MEDICINE | Facility: CLINIC | Age: 63
End: 2018-09-17

## 2018-09-17 NOTE — TELEPHONE ENCOUNTER
----- Message from Faiza Kuhn MD sent at 9/16/2018  7:22 PM CDT -----  F/u for HTN with PCP Dr. Nunes in October

## 2018-09-18 ENCOUNTER — PATIENT MESSAGE (OUTPATIENT)
Dept: INTERNAL MEDICINE | Facility: CLINIC | Age: 63
End: 2018-09-18

## 2018-09-18 NOTE — TELEPHONE ENCOUNTER
Spoke with pt; cancelled appt w/ Dr Nunes; pt does not wqish to reschedule at this time; advised pt BP checks are routine and we are monitoring hers. Pt stated she will call back to make an appt; she is currently still in bed at this time.

## 2018-10-31 ENCOUNTER — TELEPHONE (OUTPATIENT)
Dept: ORTHOPEDICS | Facility: CLINIC | Age: 63
End: 2018-10-31

## 2018-10-31 DIAGNOSIS — M79.641 RIGHT HAND PAIN: Primary | ICD-10-CM

## 2018-10-31 NOTE — TELEPHONE ENCOUNTER
Joy Singer reminded of appointment on 11/1/18 with Dr. MAXIM Flanagan w/time and location. Notified of need for xray before OV w/date, time, and location of appts.

## 2018-11-01 ENCOUNTER — HOSPITAL ENCOUNTER (OUTPATIENT)
Dept: RADIOLOGY | Facility: OTHER | Age: 63
Discharge: HOME | End: 2018-11-01
Attending: ORTHOPAEDIC SURGERY
Payer: MEDICARE

## 2018-11-01 ENCOUNTER — OFFICE VISIT (OUTPATIENT)
Dept: ORTHOPEDICS | Facility: CLINIC | Age: 63
End: 2018-11-01
Payer: MEDICARE

## 2018-11-01 VITALS
HEART RATE: 71 BPM | SYSTOLIC BLOOD PRESSURE: 111 MMHG | WEIGHT: 293 LBS | HEIGHT: 67 IN | BODY MASS INDEX: 45.99 KG/M2 | DIASTOLIC BLOOD PRESSURE: 68 MMHG

## 2018-11-01 DIAGNOSIS — M79.641 RIGHT HAND PAIN: ICD-10-CM

## 2018-11-01 DIAGNOSIS — M77.8 WRIST TENDONITIS: Primary | ICD-10-CM

## 2018-11-01 PROCEDURE — 99999 PR PBB SHADOW E&M-EST. PATIENT-LVL III: CPT | Mod: PBBFAC,,, | Performed by: ORTHOPAEDIC SURGERY

## 2018-11-01 PROCEDURE — 73130 X-RAY EXAM OF HAND: CPT | Mod: TC,FY,RT

## 2018-11-01 PROCEDURE — 99213 OFFICE O/P EST LOW 20 MIN: CPT | Mod: PBBFAC,25 | Performed by: ORTHOPAEDIC SURGERY

## 2018-11-01 PROCEDURE — 73130 X-RAY EXAM OF HAND: CPT | Mod: 26,RT,, | Performed by: RADIOLOGY

## 2018-11-01 PROCEDURE — 99213 OFFICE O/P EST LOW 20 MIN: CPT | Mod: S$PBB,,, | Performed by: ORTHOPAEDIC SURGERY

## 2018-11-01 RX ORDER — PSEUDOEPHEDRINE HCL 30 MG
30 TABLET ORAL EVERY 4 HOURS PRN
COMMUNITY
End: 2019-08-27

## 2018-11-01 RX ORDER — FOLIC ACID 0.4 MG
400 TABLET ORAL DAILY
COMMUNITY

## 2018-11-01 NOTE — PROGRESS NOTES
"Subjective:      Joy Singer is a 63 y.o. female self-referred for evaluation and treatment of right wrist pain. This is evaluated as a pain from using a cane for knee. injury. The pain began a few months ago. The pain is located primarily in the palmar area, and ulnar side. When pt swims laps fingers go numb.  She describes the symptoms as numbing and long finger feels like"bad arthritis" , really stiff. Symptoms improve with rest. The symptoms are worse with movement. The patient  does not have neck pain. The patient is active in swimming. Treatment to date has been brace, with significant relief. Pain radiated from pinky to elbow. No numbnes. Pt also c/o long finger pain at MCP right hand.   Outside reports reviewed: none.          Objective:         General :    alert, appears stated age and cooperative   Gait:  Antalgic. The patient can bear weight on the injured extremity.   Bilateral Upper Extremity  Swelling:  none noted   Bruising:   none noted   Tenderness:   present in right FCU insertion   Wrist ROM:   palmar flexion 90/90, dorsiflexion 90/90, pronation 90/90, supination 90/90   Atrophy:  absent   Strength:   normal/normal   Sensation:  normal   Two-Point Discr.:      Phalen's:   negative/negative   Tinel's   negative/negative     Imaging  X-Ray: taken and unable to be reviewed due to system being down   Assessment:      right wrist pain. Plan to assess long finger MCP joint.     Plan:      Questions solicited and answered..  Follow up: 6 weeks after OT  "

## 2018-11-07 RX ORDER — PRAVASTATIN SODIUM 40 MG/1
TABLET ORAL
Qty: 90 TABLET | Refills: 3 | Status: SHIPPED | OUTPATIENT
Start: 2018-11-07 | End: 2019-12-03 | Stop reason: SDUPTHER

## 2018-11-08 ENCOUNTER — CLINICAL SUPPORT (OUTPATIENT)
Dept: CARDIOLOGY | Facility: CLINIC | Age: 63
End: 2018-11-08
Attending: INTERNAL MEDICINE
Payer: MEDICARE

## 2018-11-08 ENCOUNTER — LAB VISIT (OUTPATIENT)
Dept: LAB | Facility: HOSPITAL | Age: 63
End: 2018-11-08
Attending: INTERNAL MEDICINE
Payer: MEDICARE

## 2018-11-08 VITALS
HEIGHT: 67 IN | SYSTOLIC BLOOD PRESSURE: 128 MMHG | DIASTOLIC BLOOD PRESSURE: 80 MMHG | BODY MASS INDEX: 45.99 KG/M2 | WEIGHT: 293 LBS | HEART RATE: 57 BPM

## 2018-11-08 DIAGNOSIS — I10 ESSENTIAL HYPERTENSION: ICD-10-CM

## 2018-11-08 DIAGNOSIS — E78.00 PURE HYPERCHOLESTEROLEMIA: ICD-10-CM

## 2018-11-08 DIAGNOSIS — G47.33 OSA (OBSTRUCTIVE SLEEP APNEA): ICD-10-CM

## 2018-11-08 LAB
ALT SERPL W/O P-5'-P-CCNC: 16 U/L
ANION GAP SERPL CALC-SCNC: 6 MMOL/L
ASCENDING AORTA: 3.67 CM
AST SERPL-CCNC: 18 U/L
AV MEAN GRADIENT: 10.8 MMHG
AV PEAK GRADIENT: 17.47 MMHG
AV VALVE AREA: 3.2 CM2
BSA FOR ECHO PROCEDURE: 2.75 M2
BUN SERPL-MCNC: 22 MG/DL
CALCIUM SERPL-MCNC: 10.1 MG/DL
CHLORIDE SERPL-SCNC: 108 MMOL/L
CHOLEST SERPL-MCNC: 183 MG/DL
CHOLEST/HDLC SERPL: 4.1 {RATIO}
CO2 SERPL-SCNC: 27 MMOL/L
CREAT SERPL-MCNC: 1.1 MG/DL
CV ECHO LV RWT: 0.4 CM
DOP CALC AO PEAK VEL: 2.09 M/S
DOP CALC AO VTI: 46.87 CM
DOP CALC LVOT AREA: 3.56 CM2
DOP CALC LVOT DIAMETER: 2.13 CM
DOP CALC LVOT STROKE VOLUME: 150.08 CM3
DOP CALCLVOT PEAK VEL VTI: 42.14 CM
E WAVE DECELERATION TIME: 229.55 MSEC
E/A RATIO: 0.83
ECHO LV POSTERIOR WALL: 0.92 CM (ref 0.6–1.1)
EST. GFR  (AFRICAN AMERICAN): >60 ML/MIN/1.73 M^2
EST. GFR  (NON AFRICAN AMERICAN): 53.9 ML/MIN/1.73 M^2
FRACTIONAL SHORTENING: 47 % (ref 28–44)
GLUCOSE SERPL-MCNC: 137 MG/DL
HDLC SERPL-MCNC: 45 MG/DL
HDLC SERPL: 24.6 %
INTERVENTRICULAR SEPTUM: 1.04 CM (ref 0.6–1.1)
IVRT: 0.12 MSEC
LA MAJOR: 6.38 CM
LA MINOR: 5.95 CM
LA WIDTH: 4.47 CM
LDLC SERPL CALC-MCNC: 114.8 MG/DL
LEFT ATRIUM SIZE: 3.8 CM
LEFT ATRIUM VOLUME INDEX: 32.3 ML/M2
LEFT ATRIUM VOLUME: 88.9 CM3
LEFT INTERNAL DIMENSION IN SYSTOLE: 2.43 CM (ref 2.1–4)
LEFT VENTRICLE DIASTOLIC VOLUME INDEX: 34.53 ML/M2
LEFT VENTRICLE DIASTOLIC VOLUME: 94.97 ML
LEFT VENTRICLE MASS INDEX: 55.2 G/M2
LEFT VENTRICLE SYSTOLIC VOLUME INDEX: 7.6 ML/M2
LEFT VENTRICLE SYSTOLIC VOLUME: 20.88 ML
LEFT VENTRICULAR INTERNAL DIMENSION IN DIASTOLE: 4.55 CM (ref 3.5–6)
LEFT VENTRICULAR MASS: 151.77 G
MV PEAK A VEL: 1 M/S
MV PEAK E VEL: 0.83 M/S
MV STENOSIS PRESSURE HALF TIME: 66.57 MS
MV VALVE AREA P 1/2 METHOD: 3.3 CM2
NONHDLC SERPL-MCNC: 138 MG/DL
PISA TR MAX VEL: 2.66 M/S
POTASSIUM SERPL-SCNC: 3.9 MMOL/L
PULM VEIN S/D RATIO: 1.29
PV PEAK D VEL: 0.42 M/S
PV PEAK S VEL: 0.54 M/S
RA MAJOR: 5.95 CM
RA PRESSURE: 3 MMHG
RIGHT VENTRICULAR END-DIASTOLIC DIMENSION: 3.79 CM
SINUS: 3.37 CM
SODIUM SERPL-SCNC: 141 MMOL/L
STJ: 2.98 CM
TR MAX PG: 28.3 MMHG
TRICUSPID ANNULAR PLANE SYSTOLIC EXCURSION: 3.04 CM
TRIGL SERPL-MCNC: 116 MG/DL
TV REST PULMONARY ARTERY PRESSURE: 31.3 MMHG

## 2018-11-08 PROCEDURE — 36415 COLL VENOUS BLD VENIPUNCTURE: CPT | Mod: PO

## 2018-11-08 PROCEDURE — 84460 ALANINE AMINO (ALT) (SGPT): CPT

## 2018-11-08 PROCEDURE — 99999 PR PBB SHADOW E&M-EST. PATIENT-LVL II: CPT | Mod: PBBFAC,,,

## 2018-11-08 PROCEDURE — 99212 OFFICE O/P EST SF 10 MIN: CPT | Mod: PBBFAC,PO,25

## 2018-11-08 PROCEDURE — 80061 LIPID PANEL: CPT

## 2018-11-08 PROCEDURE — 93306 TTE W/DOPPLER COMPLETE: CPT | Mod: PBBFAC,PO | Performed by: INTERNAL MEDICINE

## 2018-11-08 PROCEDURE — 84450 TRANSFERASE (AST) (SGOT): CPT

## 2018-11-08 PROCEDURE — 80048 BASIC METABOLIC PNL TOTAL CA: CPT

## 2018-11-09 ENCOUNTER — CLINICAL SUPPORT (OUTPATIENT)
Dept: REHABILITATION | Facility: HOSPITAL | Age: 63
End: 2018-11-09
Payer: MEDICARE

## 2018-11-09 DIAGNOSIS — M25.531 PAIN IN RIGHT WRIST: ICD-10-CM

## 2018-11-09 PROCEDURE — G8987 SELF CARE CURRENT STATUS: HCPCS | Mod: CK,PN

## 2018-11-09 PROCEDURE — 97165 OT EVAL LOW COMPLEX 30 MIN: CPT | Mod: PN

## 2018-11-09 PROCEDURE — 97018 PARAFFIN BATH THERAPY: CPT | Mod: PN

## 2018-11-09 PROCEDURE — 97110 THERAPEUTIC EXERCISES: CPT | Mod: PN

## 2018-11-09 PROCEDURE — G8988 SELF CARE GOAL STATUS: HCPCS | Mod: CJ,PN

## 2018-11-09 NOTE — PATIENT INSTRUCTIONS
[facility], OCCUPATIONAL THERAPY  HOME EXERCISE PROGRAM     Complete the following strengthening exercises 2x/day.  Hold each position x3 seconds then relax. Complete 5 repetitions each.     Resisted Wrist Extension   With forearm resting palm down, resist upward movement   of hand with other hand.       Resisted Wrist Flexion   With forearm resting palm up, resist upward movement   of hand with other hand.       Resisted Wrist Radial Deviation  With forearm resting with thumb up, use other hand to   resist upward movement of hand at wrist.       Resisted Wrist Ulnar Deviation  With forearm resting thumb up, use other hand to resist   downward movement of hand at wrist.    Copyright © I. All rights reserved.     Therapist: FAVIOLA Her, OTR/L, CHT   Occupational therapist, Certified Hand Therapist

## 2018-11-09 NOTE — PLAN OF CARE
[facility] Occupational Therapy  Initial Evaluation     Name: Joy Singer  Clinic Number: [id number]    Medical Diagnosis: Right wrist tendinitis  Date of Onset: 3-6 months ago  Date of Surgery: NA  Surgical Procedure: NA  Therapy Diagnosis:   Encounter Diagnosis   Name Primary?    Pain in right wrist      Precautions: Standard    Physician: Jackie Flanagan, *  Physician Orders: eval and treat right wrist tendinitis  Date of Return to MD: PRN    Evaluation Date: 11/9/2018  Plan of Care Certification Period: 12/07/2018    Visit #: / Visits authorized: 1/18  Insurance Authorization period Expiration: 12/31/2018    Time In:1110AM  Time Out: 1205PM  Total Billable Time: 55 minutes  Charges for this Visit: Low eval, P, TE x 1      Subjective     Involved Side:  right  Dominant Side: Right  Imaging:  See EMR  Mechanism of Injury: unsure, no falls  History of Current Condition: Wrist was hurting about 3 months ago, saw primary orthopedic (for back/knee) and he gave her brace, she saw Dr. Flanagan and she gave her a longer brace. She reports she thinks the cause may be from using a cane for knee pain. She reports the xrays did not show any fractures. She reports taking brace off for bathing, cooking, etc. She has not been using the cane in the hand.   Previous Therapy: None    Pain:  Functional Pain Scale Rating 0-10:   2/10 at current  2/10 at best  5/10 at worst  Location: ulnar side of wrist and volar somtimes  Description: Burning  Aggravating Factors: Lifting, Getting out of bed/chair and pushing, lifting in supination  Easing Factors: rest      Past Medical History/Physical Systems Review:   Joy Singer  has a past medical history of Breast cyst, Carotid artery occlusion, Chronic back pain, Fibrocystic breast, HA (headache), Hyperlipidemia, Morbid obesity with BMI of 60.0-69.9, adult, and Obstructive sleep apnea (adult) (pediatric).    Joy Singer  has a past surgical history  that includes eye growth removal (2010); Carpal tunnel release (2000); rotator cuff surgery (2011); Hysterectomy (2004); Bladder suspension (2004); toselectomy (1962); Colonoscopy (2008 and 2011); Fracture surgery; Breast biopsy (Bilateral, 2003 and 2011); Breast cyst aspiration; and LUMBAR PUNCTURE / INJECTION (N/A, 12/29/2014).    Joy has a current medication list which includes the following prescription(s): acetazolamide, amlodipine, ascorbic acid (vitamin c), aspirin, aspirin/acetaminophen/caffeine, calcium acetate/aluminum sulf, calcium carbonate-vitamin d3, carisoprodol, diclofenac sodium, diphenhydramine, dm/pe/acetaminophen/doxylamine, docusate sodium, folic acid, hydrocodone-acetaminophen, ibuprofen, ketoconazole, folic acid/multivit-min/lutein, polyethylene glycol, pravastatin, pseudoephedrine, and vitamin e.    Review of patient's allergies indicates:   Allergen Reactions    Percocet [oxycodone-acetaminophen] Itching    Unable to assess Rash     Insect Bites          Patient's Goals for Therapy: See Assessment chart below.    Occupation:  See Assessment chart below.     Functional Limitations/Social History: See Assessment chart below.       Objective     Observation/Appearance: Pt. Presents with off the shelf brace from MD office. She states she sleeps with it at night. She reports it will hurt if it isn't supported for too long.     Edema. Measured in centimeters.   11/9/2018 11/9/2018    Left Right   Wrist Crease 18.0 18.0   Figure of 8     MCPs 20.0 20.0     Elbow and Wrist ROM. Measured in degrees.   11/9/2018 11/9/2018    Left Right    AROM AROM   Supination/Pronation 70/80 70/80 *pain sup   Wrist Ext/Flex 70/70 70/70   Wrist RD/UD 25/30 25/30          Strength (Dynamometer) and Pinch Strength (Pinch Gauge)  Measured in pounds.   11/9/2018 11/9/2018    Left Right   Rung II 60 45   Key Pinch 15 16   3pt Pinch 17 15     Sensation : intact per report    Manual Muscle Test   11/9/2018  11/9/2018    Left Right   Wrist Extension  5/5 5/5   Wrist Flexion 5/5 4+/5 pain   Radial Deviation 5/5 5/5   Ulnar Deviation 5/5 4/5 pain   Supination 5/5 4/5 pain   Pronation 5/5 5/5     Special Tests  Thumb CMC Grind Test    Finkelstein's Test    Phalen's Test    Tinel's Test    Cristiano's Test    Fountain-Littler Test    Digital Collateral Stress Test    ORL Test    Froment's Sign    Pinch OK Sign    Lynnette's Sign     Egawa Sign     Clamp Sign     SL Ballottement Test    LT Ballottement Test    Scaphoid/WatsonTest    Linscheid's Test    Metacarpal Stress Test    Piano Key Test    ECU Synergy Test    Ulnar Compression Test    TFCC Load Test    Ulnocarpal Stress Test    Midcarpal Shift Test    Pisiform Boost Test    Elbow Flexion Test    Scratch Collapse Test    Tennis Elbow Test    Resisted Middle Finger Extension Test    Mills Test    Chair Test    Biceps Squeeze Test    Biceps Hook Test    Milking Test    Press Up Manuever    PLRI Test    Valgus Stress Test    Varus Stress Test    Spurling Test    Cervical Distraction Test    ULNTT - General    ULNTT - Median Nerve    ULNTT - Radial Nerve    ULNTT - Ulnar Nerve       CMS Impairment/Limitation/Restriction for FOTO Survey  Therapist reviewed FOTO scores for Joy JARRETT Enmanuel.  FOTO documents entered into [employer] - see Media section.    Intake Limitation Score: 47% - 11/9/2018  Category: Self Care    Current : CK = at least 40% but < 60% impaired, limited or restricted  Goal: CJ = at least 20% but < 40% impaired, limited or restricted  Discharge:TBD          Treatment     Treatment Time In: 1130AM  Treatment Time Out: 1200PM  Total Treatment time separate from Evaluation time:30min    Joy received the following supervised modalities after being cleared for contradictions for 10 minutes:   -Patient received paraffin bath to Right wrist/ hand to increase blood flow, circulation, pain management and for tissue elasticity prior to therex.       Joy received the  following direct contact modalities after being cleared for contraindications for 0 minutes:  -none    Joy received the following manual therapy techniques for 0 minutes:   -none today    Joy received therapeutic exercises for 8 minutes including:  -  Isometric wrist strengthening X 3 sec holds x 5   Ext/flx/RD/UD   Isometric  strengthening X 3sec holds x 5             Home Exercise Program/Education:  Issued HEP (see patient instructions in EMR) and educated on modality use for pain management . Exercises were reviewed and Joy was able to demonstrate them prior to the end of the session.   Pt received a written copy of exercises to perform at home. Joy demonstrated good  understanding of the education provided.  Pt was advised to perform these exercises free of pain, and to stop performing them if pain occurs.    Patient/Family Education: role of OT, goals for OT, scheduling/cancellations - pt verbalized understanding. Discussed insurance limitations with patient.    Additional Education provided: use of heat modalities as needed at home, using FA pronated grasp for lifting to decrease pain.      Assessment     Joy Singer is a 63 y.o. female referred to outpatient occupational/hand therapy and presents with a medical diagnosis of right wrist tendinitis. She demonstrates limitations as described in the chart below. Following brief medical record review it is determined that pt will benefit from occupational therapy services in order to maximize pain free and/or functional use of right wrist/hand. The following goals were discussed with the patient and patient is in agreement with them as to be addressed in the treatment plan. The patient's rehab potential is Excellent.     Anticipated barriers to occupational therapy: none  Pt has no cultural, educational or language barriers to learning provided.    Profile and History Assessment of Occupational Performance Level of Clinical Decision Making  Complexity Score   Occupational Profile:   Joy Singer is a 63 y.o. female who lives with their spouse and is currently a home-maker.    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 Her main goal for therapy is to decrease pain.     Previous functional status: Independent with all ADLs.     Comorbidities:   See PMH and Physical Systems Review Section above.    Medical and Therapy History Review:   Brief               Performance Deficits    Physical:  Muscle Power/Strength  Muscle Endurance   Strength  Pinch Strength  Gross Motor Coordination  Fine Motor Coordination  Pain    Cognitive:  No Deficits    Psychosocial:    No Deficits     Clinical Decision Making:  low    Assessment Process:  Problem-Focused Assessments    Modification/Need for Assistance:  Not Necessary    Intervention Selection:  Limited Treatment Options       low  Based on PMHX, co morbidities , data from assessments and functional level of assistance required with task and clinical presentation directly impacting function.         Goals   The following goals were discussed with the patient and patient is in agreement with them as to be addressed in the treatment plan.   Long Term Goals (LTGs); to be met by discharge.  LTG #1: Pt will report a pain level of 0 out of 10 with ADLs   LTG #2: Pt will demo improved FOTO score by 20 points.   LTG #3: Pt will return to prior level of function for ADLs and household management.     Short Term Goals (STGs); to be met within 4 weeks (12/07/2018).  STG #1a: Pt will report 3 out of 10 pain level with ADLs.  STG #2a: Pt will report/demo [place] with self care ADLs.  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  STG #3a: Pt will demonstrate independence with issued HEP.   STG #3b: Pt will  álvaro improved  strength by 10# to improve independence in ADLs    Plan     Outpatient occupational therapy 2 times weekly for 6 weeks during the certification period from 11/9/2018 to 12/21/2018.    Treatment to include: Paraffin, Fluidotherapy, Manual therapy/joint mobilizations, Modalities for pain management, US 3 mhz, Therapeutic exercises/activities., Strengthening, Joint Protection and Energy Conservation, as well as any other treatments deemed necessary based on the patient's needs or progress.     Therapist: Maria L Royal, FAVIOLA, OTR/L, CHT   Occupational therapist, Certified Hand Therapist       I certify the need for these services furnished under this plan of treatment and while under my care    ____________________________________                         __________________  Physician/Referring Practitioner                                               Date of Signature

## 2018-11-13 ENCOUNTER — CLINICAL SUPPORT (OUTPATIENT)
Dept: REHABILITATION | Facility: HOSPITAL | Age: 63
End: 2018-11-13
Payer: MEDICARE

## 2018-11-13 ENCOUNTER — TELEPHONE (OUTPATIENT)
Dept: SURGERY | Facility: CLINIC | Age: 63
End: 2018-11-13

## 2018-11-13 DIAGNOSIS — M25.531 PAIN IN RIGHT WRIST: ICD-10-CM

## 2018-11-13 DIAGNOSIS — Z12.31 ENCOUNTER FOR SCREENING MAMMOGRAM FOR BREAST CANCER: Primary | ICD-10-CM

## 2018-11-13 PROCEDURE — 97110 THERAPEUTIC EXERCISES: CPT | Mod: PN

## 2018-11-13 PROCEDURE — 97018 PARAFFIN BATH THERAPY: CPT | Mod: PN

## 2018-11-13 NOTE — TELEPHONE ENCOUNTER
Left VM with my direct contact information, patient advised to give a return call with any questions or concerns regarding order for mammo, order is in Epic

## 2018-11-13 NOTE — PROGRESS NOTES
"  Occupational Therapy Daily Treatment Note      Date: 11/13/2018  Name: Joy Singer  Clinic Number: [id number]     Medical Diagnosis: Right wrist tendinitis  Date of Onset: 3-6 months ago  Date of Surgery: NA  Surgical Procedure: NA  Therapy Diagnosis:        Encounter Diagnosis   Name Primary?    Pain in right wrist        Precautions: Standard     Physician: Jackie Flanagan, *  Physician Orders: eval and treat right wrist tendinitis  Date of Return to MD: PRN     Evaluation Date: 11/9/2018  Plan of Care Certification Period: 12/07/2018     Visit #: / Visits authorized: 2/18  Insurance Authorization period Expiration: 12/31/2018    Time In:11AM  Time Out: 1150AM  Total Billable Time:  50minutes  Charges for this Visit: P, TE x 2    Subjective     Pt reports: " It is feeling ok" " It still hurts to do anything with my palm turned up position" " It hurts less now up the side of the arm"   she was compliant with home exercise program given last session.   Response to previous treatment:Pt. Reports finding a ball to squeeze and increase in pain with wrist flexion isometrics  Functional change: none    Pain: 1/10  Location: right ulnar side    Objective     Observation/Appearance: Pt. Presents without off the shelf brace from MD office this date.      Edema. Measured in centimeters.    11/9/2018 11/9/2018     Left Right   Wrist Crease 18.0 18.0   Figure of 8       MCPs 20.0 20.0      Elbow and Wrist ROM. Measured in degrees.    11/9/2018 11/9/2018     Left Right     AROM AROM   Supination/Pronation 70/80 70/80 *pain sup   Wrist Ext/Flex 70/70 70/70   Wrist RD/UD 25/30 25/30             Strength (Dynamometer) and Pinch Strength (Pinch Gauge)  Measured in pounds.    11/9/2018 11/9/2018     Left Right   Rung II 60 45   Key Pinch 15 16   3pt Pinch 17 15      Sensation : intact per report     Manual Muscle Test    11/9/2018 11/9/2018     Left Right   Wrist Extension  5/5 5/5   Wrist Flexion 5/5 4+/5 pain "   Radial Deviation 5/5 5/5   Ulnar Deviation 5/5 4/5 pain   Supination 5/5 4/5 pain   Pronation 5/5 5/5      CMS Impairment/Limitation/Restriction for FOTO Survey  Therapist reviewed FOTO scores for Joy Singer.  FOTO documents entered into [employer] - see Media section.     Intake Limitation Score: 47% - 11/9/2018  Category: Self Care     Current : CK = at least 40% but < 60% impaired, limited or restricted  Goal: CJ = at least 20% but < 40% impaired, limited or restricted  Discharge:TBD             Joy received the following supervised modalities after being cleared for contradictions for 15 minutes:   -Patient received paraffin bath with MHP to Right wrist/ hand to increase blood flow, circulation, pain management and for tissue elasticity prior to therex.         Joy received the following direct contact modalities after being cleared for contraindications for 8 minutes:  -Patient received ultrasound to  FCU tendon insertion and volar wrist area to increase blood flow, circulation, tissue elasticity, pain management and for wound/scar management for 8 minutes @ 3 Mhz, Intensity 0.8 w/cm2 at 50% duty cycle.        Joy received the following manual therapy techniques for 0 minutes:   -none today     Joy received therapeutic exercises  for 27  minutes including:  -  Wrist flexion stretch ( without over stretch from other hand) X 10 sec holds x 10 reps   Isometric wrist strengthening X 3 sec holds x 5  (flexion in FA neutral)   Ext/flx/RD/UD   Isometric  strengthening X 3sec holds x 10   Wrist dextericiser X 3 min   Elbow PRE flexion palm up only X 2# x 3/15   Shoulder T-band Yellow   Lats , Rows 2/15                 Home Exercises and Education Provided     Education provided: Continued attempt to not grasp, lift, or carry items in palm up position (FA supination). Pt. Verbalized understanding.   - Progress towards goals     Written Home Exercises Provided: Patient instructed to cont prior HEP.-  Perform Wrist flexion isometrics with FA neutral position.   Exercises were reviewed and Joy was able to demonstrate them prior to the end of the session.  Joy demonstrated good  understanding of the education provided.   .   See EMR under Patient Instructions for exercises provided prior visit.     Assessment     Joy Singer is a 63 y.o. female referred to outpatient occupational/hand therapy and presents with a medical diagnosis of right wrist tendinitis about FCU. She reports compliance with HEP. Altered way of performing wrist flexion isometrics with pain free result. Added US this session. Added proximal strengthening activities with good participation. No pain reported following session.     Joy is progressing well towards her goals and there are no updates to goals at this time. Pt prognosis continues as Excellent. Pt will continue to benefit from skilled outpatient occupational therapy to address the deficits listed in the problem list on initial evaluation, provide pt/family education and to maximize pt's level of independence in the home and community environment.     Anticipated barriers to continued occupational therapy: none    Pt's spiritual, cultural and educational needs considered and pt agreeable to plan of care and goals.    Goals     The following goals were discussed with the patient and patient is in agreement with them as to be addressed in the treatment plan.   Long Term Goals (LTGs); to be met by discharge.  LTG #1: Pt will report a pain level of 0 out of 10 with ADLs     LTG #2: Pt will demo improved FOTO score by 20 points.   LTG #3: Pt will return to prior level of function for ADLs and household management.      Short Term Goals (STGs); to be met within 4 weeks (12/07/2018).  STG #1a: Pt will report 3 out of 10 pain level with ADLs.  STG #2a: Pt will report/demo [place] with self care ADLs.  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  STG #3a:  Pt will demonstrate independence with issued HEP.   STG #3b: Pt will demo improved  strength by 10# to improve independence in ADLs    Plan     Continue skilled occupational therapy with individualized plan of care 2x/week for 6 weeks from 11/09/2018 to 12/21/2018.    Discussed Plan of Care with patient: Yes  Updates/Grading for next session: cont to progress with strengthening as able    Maria L Royal, FAVIOLA, OTR/L, CHT   Occupational therapist, Certified Hand Therapist

## 2018-11-13 NOTE — TELEPHONE ENCOUNTER
----- Message from Carlos Piper sent at 11/13/2018  9:42 AM CST -----  Contact: Pt   Pt is requesting orders for mammogram     Pt can be reached at [phone].

## 2018-11-14 ENCOUNTER — CLINICAL SUPPORT (OUTPATIENT)
Dept: REHABILITATION | Facility: HOSPITAL | Age: 63
End: 2018-11-14
Payer: MEDICARE

## 2018-11-14 DIAGNOSIS — M25.531 PAIN IN RIGHT WRIST: ICD-10-CM

## 2018-11-14 PROCEDURE — 97018 PARAFFIN BATH THERAPY: CPT | Mod: PN

## 2018-11-14 PROCEDURE — 97110 THERAPEUTIC EXERCISES: CPT | Mod: PN

## 2018-11-14 NOTE — PROGRESS NOTES
"  Occupational Therapy Daily Treatment Note      Date: 11/14/2018  Name: Joy Singer  Clinic Number: [id number]     Medical Diagnosis: Right wrist tendinitis  Date of Onset: 3-6 months ago  Date of Surgery: NA  Surgical Procedure: NA  Therapy Diagnosis:        Encounter Diagnosis   Name Primary?    Pain in right wrist        Precautions: Standard     Physician: Jackie Flanagan, *  Physician Orders: eval and treat right wrist tendinitis  Date of Return to MD: PRN     Evaluation Date: 11/9/2018  Plan of Care Certification Period: 12/07/2018     Visit #: / Visits authorized: 3/18  Insurance Authorization period Expiration: 12/31/2018    Time In:3PM  Time Out: 350PM  Total Billable Time:  50minutes  Charges for this Visit: P, TE x 2    Subjective     Pt reports: " I feel good- no pain after yesterday"   she was compliant with home exercise program given last session.   Response to previous treatment:Pt. Reports finding a ball to squeeze and increase in pain with wrist flexion isometrics  Functional change: none    Pain: 1-3/10  Location: right ulnar side    Objective     Observation/Appearance: Pt. Presents without off the shelf brace from MD office this date.      Edema. Measured in centimeters.    11/9/2018 11/9/2018     Left Right   Wrist Crease 18.0 18.0   Figure of 8       MCPs 20.0 20.0      Elbow and Wrist ROM. Measured in degrees.    11/9/2018 11/9/2018     Left Right     AROM AROM   Supination/Pronation 70/80 70/80 *pain sup   Wrist Ext/Flex 70/70 70/70   Wrist RD/UD 25/30 25/30             Strength (Dynamometer) and Pinch Strength (Pinch Gauge)  Measured in pounds.    11/9/2018 11/9/2018     Left Right   Rung II 60 45   Key Pinch 15 16   3pt Pinch 17 15      Sensation : intact per report     Manual Muscle Test    11/9/2018 11/9/2018     Left Right   Wrist Extension  5/5 5/5   Wrist Flexion 5/5 4+/5 pain   Radial Deviation 5/5 5/5   Ulnar Deviation 5/5 4/5 pain   Supination 5/5 4/5 pain "   Pronation 5/5 5/5      CMS Impairment/Limitation/Restriction for FOTO Survey  Therapist reviewed FOTO scores for Joy Singer.  FOTO documents entered into [employer] - see Media section.     Intake Limitation Score: 47% - 11/9/2018  Category: Self Care     Current : CK = at least 40% but < 60% impaired, limited or restricted  Goal: CJ = at least 20% but < 40% impaired, limited or restricted  Discharge:TBD             Joy received the following supervised modalities after being cleared for contradictions for 15 minutes:   -Patient received paraffin bath with MHP to Right wrist/ hand to increase blood flow, circulation, pain management and for tissue elasticity prior to therex.         Joy received the following direct contact modalities after being cleared for contraindications for 8 minutes:  -Patient received ultrasound to  FCU tendon insertion and volar wrist area to increase blood flow, circulation, tissue elasticity, pain management and for wound/scar management for 8 minutes @ 3 Mhz, Intensity 0.8 w/cm2 at 50% duty cycle.        Joy received the following manual therapy techniques for 0 minutes:   -none today     Joy received therapeutic exercises  for 27   minutes including:  -  Wrist flexion stretch ( without over stretch from other hand) X 10 sec holds x 10 reps   Isometric wrist strengthening X 5 sec holds x 10  (flexion in FA neutral)   Ext/flx/RD/UD   Isometric  strengthening with tennis ball X 3sec holds x 10   Wrist dextericiser X 3 min   Elbow PRE flexion palm up only X 2# x 3/15   Shoulder T-band Red  Lats , Rows 2/15                 Home Exercises and Education Provided     Education provided: Continued attempt to not grasp, lift, or carry items in palm up position (FA supination). Pt. Verbalized understanding.   - Progress towards goals     Written Home Exercises Provided: Patient instructed to cont prior HEP.- Perform Wrist flexion isometrics with FA neutral position.   Exercises  were reviewed and Joy was able to demonstrate them prior to the end of the session.  Joy demonstrated good  understanding of the education provided.   .   See EMR under Patient Instructions for exercises provided prior visit.     Assessment     Joy Singer is a 63 y.o. female referred to outpatient occupational/hand therapy and presents with a medical diagnosis of right wrist tendinitis about FCU. She reports compliance with HEP. Altered way of performing wrist flexion isometrics with pain free result. Added US this session. Increased proximal strengthening activities with good participation. No pain reported following session.  No complaints following yesterday's session.     Joy is progressing well towards her goals and there are no updates to goals at this time. Pt prognosis continues as Excellent. Pt will continue to benefit from skilled outpatient occupational therapy to address the deficits listed in the problem list on initial evaluation, provide pt/family education and to maximize pt's level of independence in the home and community environment.     Anticipated barriers to continued occupational therapy: none    Pt's spiritual, cultural and educational needs considered and pt agreeable to plan of care and goals.    Goals     The following goals were discussed with the patient and patient is in agreement with them as to be addressed in the treatment plan.   Long Term Goals (LTGs); to be met by discharge.  LTG #1: Pt will report a pain level of 0 out of 10 with ADLs     LTG #2: Pt will demo improved FOTO score by 20 points.   LTG #3: Pt will return to prior level of function for ADLs and household management.      Short Term Goals (STGs); to be met within 4 weeks (12/07/2018).  STG #1a: Pt will report 3 out of 10 pain level with ADLs.  STG #2a: Pt will report/demo [place] with self care ADLs.  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  STG #3a: Pt will demonstrate  independence with issued HEP.   STG #3b: Pt will demo improved  strength by 10# to improve independence in ADLs    Plan     Continue skilled occupational therapy with individualized plan of care 2x/week for 6 weeks from 11/09/2018 to 12/21/2018.    Discussed Plan of Care with patient: Yes  Updates/Grading for next session: cont to progress with strengthening as able    Maria L Royal, OTD, OTR/L, CHT   Occupational therapist, Certified Hand Therapist

## 2018-11-15 ENCOUNTER — OFFICE VISIT (OUTPATIENT)
Dept: CARDIOLOGY | Facility: CLINIC | Age: 63
End: 2018-11-15
Payer: MEDICARE

## 2018-11-15 VITALS
DIASTOLIC BLOOD PRESSURE: 84 MMHG | HEART RATE: 60 BPM | BODY MASS INDEX: 47.09 KG/M2 | HEIGHT: 66 IN | SYSTOLIC BLOOD PRESSURE: 118 MMHG | WEIGHT: 293 LBS

## 2018-11-15 DIAGNOSIS — G47.33 OSA (OBSTRUCTIVE SLEEP APNEA): ICD-10-CM

## 2018-11-15 DIAGNOSIS — G93.2 PSEUDOTUMOR CEREBRI: ICD-10-CM

## 2018-11-15 DIAGNOSIS — R73.01 FASTING HYPERGLYCEMIA: ICD-10-CM

## 2018-11-15 DIAGNOSIS — E78.00 PURE HYPERCHOLESTEROLEMIA: ICD-10-CM

## 2018-11-15 DIAGNOSIS — I10 ESSENTIAL HYPERTENSION: Primary | ICD-10-CM

## 2018-11-15 PROCEDURE — 93010 ELECTROCARDIOGRAM REPORT: CPT | Mod: S$PBB,,, | Performed by: INTERNAL MEDICINE

## 2018-11-15 PROCEDURE — 93005 ELECTROCARDIOGRAM TRACING: CPT | Mod: PBBFAC,PO | Performed by: INTERNAL MEDICINE

## 2018-11-15 PROCEDURE — 99213 OFFICE O/P EST LOW 20 MIN: CPT | Mod: PBBFAC,25,PO | Performed by: INTERNAL MEDICINE

## 2018-11-15 PROCEDURE — 99214 OFFICE O/P EST MOD 30 MIN: CPT | Mod: S$PBB,,, | Performed by: INTERNAL MEDICINE

## 2018-11-15 PROCEDURE — 99999 PR PBB SHADOW E&M-EST. PATIENT-LVL III: CPT | Mod: PBBFAC,,, | Performed by: INTERNAL MEDICINE

## 2018-11-15 NOTE — PATIENT INSTRUCTIONS
You may take an extra tablet of acetazolamide if you have swelling in the feet.  But please do not do this frequently.  Also amlodipine can cause some swelling.    =============    LDL - bad type - improves with diet and medications: typically statins; most other medications that lower LDL have not been proven to prevent heart attacks.  May not improve significantly with exercise alone.  Ideally less than 100 mg/dl.   But the lower the better. Statins (cholesterol lowering meds) lower LDL. If you have coronary artery disease or blockages anywhere else in the body, it should be well below 70 mg/dl.    HDL - good type - improves with exercise.  Ideally greater than 50 mg/dl. The proportion of HDL to the total cholesterol is more important than the absolute HDL.  This means a HDL of 45 out of a total cholesterol of 130 mg'dl is pretty good, but the same HDL out of a total of  200 mg/dl is not quite as good. A level of 30% or higher is ideal.    TGs (triglycerides) - also bad - can change very quickly and considerably with certain foods. Improve with diet, exercise and high dose fish oil.  In some cases a low carbohydrate diet will lower TGs better than a low fat diet.  Ideal range  mg/dl.    Sugar, fat and cholesterol in food:     A sensible diet that limits the intake of sugars, saturated (bad) fats and trans fats while increasing the intake of unsaturated (good) fats and plant proteins is the basis of the current dietary recommendations.      We now recommend drastically reducing the intake of sugar. There is less emphasis on excluding fat.     Cholesterol in our food is no longer a significant concern, mainly because it is generally present in relatively small amounts. However please do not confuse this with the role of cholesterol in our blood and arteries. The liver converts certain foods into cholesterol.  It is this cholesterol and other fats that clogs up our arteries.       Most foods that are high in  cholesterol are also high in saturated fat. But there is way more saturated fat than cholesterol in these foods. So its the saturated fat content that matters more than cholesterol. On the other hand, there are a handful of foods that are high in cholesterol but do not contain much saturated fat: eggs, shrimp, crab legs and crawfish are OK to eat.       Saturated fat is the bad fat - you should limit your intake of this. Deep fried foods, meats and other animal fats are high in saturated fat. Cookies, donuts and most dessert and cakes are usually high in both saturated fat and sugar.       Unsaturated fat is the good fat. It contains the same number of calories as saturated fat but these fats do not get deposited in our arteries. The Mediterranean style diet encourages the intake of unsaturated fat - olive oil, avocado and unsalted nuts. So instead of baking a piece of fish, it may be better to pan-good it using olive oil.     You should eat a few servings of vegetables (and fruit as long as you are not diabetic) everyday. Substitute some plant proteins in place of meat: beans, lentils, quinoa and oatmeal. They are lean proteins.     Do not use stick butter or stick margarine. Butter that comes in a tub is soft butter. It consists of 1/2 butter and 1/2 vegetable oil., either canola or olive oil It is fine to use that.       Trans fats should definitely be avoided. Most foods that are labelled as containing 0 gms of trans fat may still contain several hundred milligrams of trans fat: creamer, margarine, dough, deep fried foods, ready made frosting, potato, corn and torilla chips, cakes, cookies, pie crusts and crackers containing shortening made with hydrogenated vegetable oil.

## 2018-11-15 NOTE — PROGRESS NOTES
"Subjective:   Patient ID:  Joy Singer is a 63 y.o. female who presents for follow-up of Hypertension      Problem List:  HTN  Sleep apnea  Pseudotumor cerebri  Multiple colonic polyp  BMI ~60    HPI:   Joy Singer feels generally well. She does not report angina or shortness of breath with exertion.  She has swelling in both feet occasionally, but not above the ankles.  BP appears well controlled. Weight has fluctuated: 375 lbs in 12/17, 344 in 1/18, then 353 lbs and 365 lbs today.  Echo 10/16:  Right ventricle was at the upper limits of normal in size measuring 3.9 cm, right atrium was also enlarged.  Left ventriclular size and function was normal.  The left atrial volume was 89 ml which is increased but when indexed for body surface area was within normal limits.Estimated PA systolic pressure was 31 mm Hg.  BP was 149/80 mm Hg in 5/18.       Dermatology- Dr. Zavaleta  GI- Dr. Wong  Orthopedics- Dr. Armendariz  [facility]  Neuroophthalmology- Dr. West ([facility])  Neurology- Dr. Vincent      Review of Systems   Constitution: Positive for weight gain ("back and forth"). Negative for weakness, malaise/fatigue and weight loss.   HENT: Negative for hearing loss and nosebleeds.    Eyes: Negative for visual disturbance.   Cardiovascular: Positive for leg swelling (feet only). Negative for chest pain, claudication, dyspnea on exertion, irregular heartbeat, near-syncope, palpitations and syncope.   Respiratory: Negative for cough, hemoptysis, sputum production and wheezing.    Hematologic/Lymphatic: Does not bruise/bleed easily.   Musculoskeletal: Positive for arthritis and back pain. Negative for falls, joint pain, joint swelling, muscle cramps, muscle weakness and neck pain.   Gastrointestinal: Negative for abdominal pain, constipation, diarrhea, heartburn and melena.   Genitourinary: Positive for frequency and nocturia. Negative for hematuria.   Neurological: Positive for headaches. Negative for " dizziness, focal weakness, light-headedness, loss of balance, numbness, paresthesias and seizures.   Psychiatric/Behavioral: Negative for depression. The patient is not nervous/anxious.        Current Outpatient Medications   Medication Sig    acetaZOLAMIDE (DIAMOX) 250 MG tablet Take 250 mg by mouth 2 (two) times daily.    amLODIPine (NORVASC) 5 MG tablet Take 1 tablet (5 mg total) by mouth once daily.    ascorbic acid (VITAMIN C WITH MARCOS HIPS) 500 mg TbSR Take by mouth once daily.    aspirin (ST. SETH ASPIRIN) 81 MG EC tablet [place] Aspirin 81 mg tablet,delayed release   Take 1 tablet every day by oral route.    aspirin/acetaminophen/caffeine (EXCEDRIN MIGRAINE ORAL) Take by mouth 2 (two) times daily as needed.    calcium acetate/aluminum sulf (DOMEBORO TOP) [place]   twice a day as needed    calcium carbonate-vitamin D3 600 mg(1,500mg) -500 unit Cap Calcium 600 with Vitamin D3 600 mg (1,500 mg)-500 unit capsule   Take 1 capsule twice a day by oral route.    carisoprodol (SOMA) 350 MG tablet Take 350 mg by mouth continuous prn.      diclofenac sodium 1 % Gel Apply 2 g topically 3 (three) times daily. Apply TID as needed for 1-2 weeks to right breast    diphenhydrAMINE (BENADRYL) 25 mg capsule Take 25 mg by mouth every 6 (six) hours as needed for Itching.    docusate sodium (COLACE) 100 MG capsule Colace 100 mg capsule   Take 2 capsule twice a day by oral route.    folic acid (FOLVITE) 400 MCG tablet Take 400 mcg by mouth once daily.    hydrocodone-acetaminophen 7.5-325mg (NORCO) 7.5-325 mg per tablet TK 1 T PO Q 6 TO 8 H PRN    ketoconazole (EXTINA) 2 % Foam Extina 2 % topical foam as needed    MULTIVITAMIN W-MINERALS/LUTEIN (CENTRUM SILVER ORAL) Take by mouth.      polyethylene glycol (GLYCOLAX) 17 gram/dose powder polyethylene glycol 3350 17 gram/dose oral powder as needed    pravastatin (PRAVACHOL) 40 MG tablet TAKE 1 TABLET DAILY    pseudoephedrine (SUDAFED) 30 MG tablet Take 30 mg by  "mouth every 4 (four) hours as needed for Congestion.    vitamin E 1000 UNIT capsule Take 1,000 Units by mouth once daily.     No current facility-administered medications for this visit.        Social History     Tobacco Use    Smoking status: Never Smoker    Smokeless tobacco: Never Used   Substance Use Topics    Alcohol use: Yes     Comment: social    Drug use: No         Objective:     Physical Exam   Constitutional: She is oriented to person, place, and time. She appears well-developed and well-nourished.   /84   Pulse 60   Ht 5' 6" (1.676 m)   Wt (!) 165.9 kg (365 lb 11.9 oz)   BMI 59.03 kg/m²      Neck: No JVD present.   Cardiovascular: Normal rate and regular rhythm.   No murmur heard.  Pulses:       Radial pulses are 2+ on the right side, and 2+ on the left side.        Posterior tibial pulses are 1+ on the right side, and 1+ on the left side.   Pulmonary/Chest: She has no decreased breath sounds. She has no wheezes. She has no rales. Chest wall is not dull to percussion.   Abdominal: Soft. Normal appearance. There is no splenomegaly or hepatomegaly. There is no tenderness.   Musculoskeletal:        Right lower leg: She exhibits no edema.        Left lower leg: She exhibits no edema.   Neurological: She is alert and oriented to person, place, and time.   Skin: Skin is warm. No bruising noted. Nails show no clubbing.   Psychiatric: Her speech is normal and behavior is normal. Cognition and memory are normal.           Lab Results   Component Value Date    CHOL 183 11/08/2018    HDL 45 11/08/2018    LDLCALC 114.8 11/08/2018    TRIG 116 11/08/2018    CHOLHDL 24.6 11/08/2018     Lab Results   Component Value Date     (H) 11/08/2018    CREATININE 1.1 11/08/2018    BUN 22 11/08/2018     11/08/2018    K 3.9 11/08/2018     11/08/2018    CO2 27 11/08/2018     Lab Results   Component Value Date    ALT 16 11/08/2018    AST 18 11/08/2018    ALKPHOS 69 05/16/2018    BILITOT 0.4 " 05/16/2018       ECG today reviewed by me. It reveals sinus rhythm with no ST or T abnormality.      Assessment and Plan:     1. Essential hypertension  Stable.  Continue same medicines.  Avoid salt.  Reviewed echo results.  Both atria mildly enlarged.  - EKG 12-lead  - Basic metabolic panel; Future    2. Pure hypercholesterolemia  Stable.  Continue same medicines.  - AST (SGOT); Future  - ALT (SGPT); Future  - Lipid panel; Future    3. LIZETH (obstructive sleep apnea)  Stable.  Continue CPAP.    4. Fasting hyperglycemia  Nutritional counseling.  Recommend follow-up Dr. Kuhn in a few months.  - Hemoglobin A1c; Future    5. Body mass index (BMI) 50-59.9, adult  Exercise.  Weight loss.  Discussed weight loss surgery.    6. Pseudotumor cerebri      Follow-up in about 1 year (around 11/15/2019).

## 2018-11-20 ENCOUNTER — CLINICAL SUPPORT (OUTPATIENT)
Dept: REHABILITATION | Facility: HOSPITAL | Age: 63
End: 2018-11-20
Payer: MEDICARE

## 2018-11-20 DIAGNOSIS — M25.531 PAIN IN RIGHT WRIST: ICD-10-CM

## 2018-11-20 PROCEDURE — 97110 THERAPEUTIC EXERCISES: CPT | Mod: PN

## 2018-11-20 PROCEDURE — 97018 PARAFFIN BATH THERAPY: CPT | Mod: PN

## 2018-11-20 NOTE — PROGRESS NOTES
"  Occupational Therapy Daily Treatment Note      Date: 11/20/2018  Name: Joy Singer  Clinic Number: [id number]     Medical Diagnosis: Right wrist tendinitis  Date of Onset: 3-6 months ago  Date of Surgery: NA  Surgical Procedure: NA  Therapy Diagnosis:        Encounter Diagnosis   Name Primary?    Pain in right wrist        Precautions: Standard     Physician: Jackie Flanagan, *  Physician Orders: eval and treat right wrist tendinitis  Date of Return to MD: PRN     Evaluation Date: 11/9/2018  Plan of Care Certification Period: 12/07/2018     Visit #: / Visits authorized: 4/18  Insurance Authorization period Expiration: 12/31/2018    Time In:8AM  Time Out: 9AM  Total Billable Time:  15 minutes untimed, 23 min direct 1:1  Charges for this Visit: P, TE x 2     Subjective     Pt reports: " It's been hurting"  Pt. Did not state that is was hurting more than before start of therapy but that it has been hurting.   she was compliant with home exercise program given last session.   Response to previous treatment:Pt. Reports finding a ball to squeeze and increase in pain with wrist flexion isometrics  Functional change: none    Pain: 3/10  Location: right ulnar side    Objective          Elbow and Wrist ROM.  WNL       Strength (Dynamometer) and Pinch Strength (Pinch Gauge)  Measured in pounds.    11/9/2018 11/9/2018 11/20/2018     Left Right Right   Rung II 60 45 50 (+5)   Uribe Pinch 15 16 WNL   3pt Pinch 17 15 WNL      Sensation : intact per report     Manual Muscle Test    11/9/2018 11/9/2018     Left Right   Wrist Extension  5/5 5/5   Wrist Flexion 5/5 4+/5 pain   Radial Deviation 5/5 5/5   Ulnar Deviation 5/5 4/5 pain   Supination 5/5 4/5 pain   Pronation 5/5 5/5      CMS Impairment/Limitation/Restriction for FOTO Survey  Therapist reviewed FOTO scores for Joy Singer.  FOTO documents entered into [employer] - see Media section.     Intake Limitation Score: 47% - 11/9/2018  Category: Self Care     Current : " CK = at least 40% but < 60% impaired, limited or restricted  Goal: CJ = at least 20% but < 40% impaired, limited or restricted  Discharge:TBD             Joy received the following supervised modalities after being cleared for contradictions for 15 minutes:   -Patient received paraffin bath with MHP to Right wrist/ hand to increase blood flow, circulation, pain management and for tissue elasticity prior to therex.         Joy received the following direct contact modalities after being cleared for contraindications for 8 minutes:  -Patient received ultrasound to  FCU tendon insertion and volar wrist area to increase blood flow, circulation, tissue elasticity, pain management and for wound/scar management for 8 minutes @ 3 Mhz, Intensity 0.8 w/cm2 at 50% duty cycle.        Joy received the following manual therapy techniques for 5 minutes:   Cross friction massage to FCU area with STM to volar FA  -     Joy received therapeutic exercises  for 32  minutes including:  -  Wrist flexion stretch gentle stretch from other hand X 10 sec holds x 10 reps   Isometric wrist strengthening X 5 sec holds x 10  (flexion in FA neutral)   Ext/flx/RD/UD   Isometric  strengthening with tennis ball X 3-5sec holds x 10   Wrist dextericiser X 3 min NT due to pain   Elbow PRE flexion palm up only X 3# x 3/15   Shoulder T-band Red  Lats , Rows 2/15            Kinesiology tape application: FA in pronation, applied space correction with 75 %stretch in middle and no stretch on ends ulnarly around wrist. Pt. Verbalized understanding of wear, care, and precautions.        Home Exercises and Education Provided     Education provided: Continued attempt to not grasp, lift, or carry items in palm up position (FA supination). Pt. Verbalized understanding.   - Progress towards goals     Written Home Exercises Provided: Patient instructed to cont prior HEP.- Perform Wrist flexion isometrics with FA neutral position.   Exercises were  reviewed and Joy was able to demonstrate them prior to the end of the session.  Joy demonstrated good  understanding of the education provided.   .   See EMR under Patient Instructions for exercises provided prior visit.     Assessment     Joy Singer is a 63 y.o. female referred to outpatient occupational/hand therapy and presents with a medical diagnosis of right wrist tendinitis about FCU. She reports compliance with HEP. She reports soreness and pain in the wrist along the FCU and ulnarly as well. . Cont US this session. Increased proximal strengthening activities with good participation. No pain reported following session.  No complaints following last session. Added space correction kinesiology tape to ulnar wrist with arm in pronation when applied for support of ulnar sided structures during daily tasks. Pt. Also states it is very painful for her to push up on her hands like to get out of the bed.      Joy is progressing well towards her goals and there are no updates to goals at this time. Pt prognosis continues as Excellent. Pt will continue to benefit from skilled outpatient occupational therapy to address the deficits listed in the problem list on initial evaluation, provide pt/family education and to maximize pt's level of independence in the home and community environment.     Anticipated barriers to continued occupational therapy: none    Pt's spiritual, cultural and educational needs considered and pt agreeable to plan of care and goals.    Goals     The following goals were discussed with the patient and patient is in agreement with them as to be addressed in the treatment plan.   Long Term Goals (LTGs); to be met by discharge.  LTG #1: Pt will report a pain level of 0 out of 10 with ADLs     LTG #2: Pt will demo improved FOTO score by 20 points.   LTG #3: Pt will return to prior level of function for ADLs and household management.      Short Term Goals (STGs); to be met within 4 weeks  (12/07/2018).  STG #1a: Pt will report 3 out of 10 pain level with ADLs.  STG #2a: Pt will report/demo [place] with self care ADLs.  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  STG #3a: Pt will demonstrate independence with issued HEP.   STG #3b: Pt will demo improved  strength by 10# to improve independence in ADLs    Plan     Continue skilled occupational therapy with individualized plan of care 2x/week for 6 weeks from 11/09/2018 to 12/21/2018.    Discussed Plan of Care with patient: Yes  Updates/Grading for next session: cont to progress with strengthening as able    FAVIOLA Her, OTR/L, CHT   Occupational therapist, Certified Hand Therapist

## 2018-11-27 RX ORDER — AMLODIPINE BESYLATE 5 MG/1
TABLET ORAL
Qty: 90 TABLET | Refills: 3 | Status: SHIPPED | OUTPATIENT
Start: 2018-11-27 | End: 2019-12-03 | Stop reason: SDUPTHER

## 2018-11-30 ENCOUNTER — CLINICAL SUPPORT (OUTPATIENT)
Dept: REHABILITATION | Facility: HOSPITAL | Age: 63
End: 2018-11-30
Payer: MEDICARE

## 2018-11-30 DIAGNOSIS — M25.531 PAIN IN RIGHT WRIST: ICD-10-CM

## 2018-11-30 PROCEDURE — 97140 MANUAL THERAPY 1/> REGIONS: CPT | Mod: PN

## 2018-11-30 PROCEDURE — 97018 PARAFFIN BATH THERAPY: CPT | Mod: PN,59

## 2018-11-30 NOTE — PROGRESS NOTES
"  Occupational Therapy Daily Treatment Note      Date: 11/30/2018  Name: Joy Singer  Clinic Number: [id number]     Medical Diagnosis: Right wrist tendinitis  Date of Onset: 3-6 months ago  Date of Surgery: NA  Surgical Procedure: NA  Therapy Diagnosis:        Encounter Diagnosis   Name Primary?    Pain in right wrist        Precautions: Standard     Physician: Jackie Flanagan, *  Physician Orders: eval and treat right wrist tendinitis  Date of Return to MD: PRN     Evaluation Date: 11/9/2018  Plan of Care Certification Period: 12/07/2018     Visit #: / Visits authorized: 5/18  Insurance Authorization period Expiration: 12/31/2018    Time In: 1100 AM  Time Out: 1155 AM  Total Billable Time:  25 min  Charges for this Visit: Px1, MTx1    Subjective     Pt reports: "I don't want to do my theraband exercises because my shld has been hurting me."  she was compliant with home exercise program given last session.   Response to previous treatment:Pt. Reports finding a ball to squeeze and increase in pain with wrist flexion isometrics  Functional change: none    Pain: 3/10  Location: right ulnar side    Objective          Elbow and Wrist ROM.  WNL       Strength (Dynamometer) and Pinch Strength (Pinch Gauge)  Measured in pounds.    11/9/2018 11/9/2018 11/20/2018     Left Right Right   Rung II 60 45 50 (+5)   Uribe Pinch 15 16 WNL   3pt Pinch 17 15 WNL      Sensation : intact per report     Manual Muscle Test    11/9/2018 11/9/2018     Left Right   Wrist Extension  5/5 5/5   Wrist Flexion 5/5 4+/5 pain   Radial Deviation 5/5 5/5   Ulnar Deviation 5/5 4/5 pain   Supination 5/5 4/5 pain   Pronation 5/5 5/5      CMS Impairment/Limitation/Restriction for FOTO Survey  Therapist reviewed FOTO scores for Joy Singer.  FOTO documents entered into [employer] - see Media section.     Intake Limitation Score: 47% - 11/9/2018  Category: Self Care     Current : CK = at least 40% but < 60% impaired, limited or restricted  Goal: " CJ = at least 20% but < 40% impaired, limited or restricted  Discharge:TBD             Joy received the following supervised modalities after being cleared for contradictions for 15 minutes:   -Patient received paraffin bath with MHP to Right wrist/ hand to increase blood flow, circulation, pain management and for tissue elasticity prior to therex.         Joy received the following direct contact modalities after being cleared for contraindications for 8 minutes: Provided by rehab technician with supervision.   -Patient received ultrasound to  FCU tendon insertion and volar wrist area to increase blood flow, circulation, tissue elasticity, pain management and for wound/scar management for 8 minutes @ 3 Mhz, Intensity 0.8 w/cm2 at 50% duty cycle.        Joy received the following manual therapy techniques for 10 minutes:   Cross friction massage to FCU area with STM to volar FA  -     Joy received therapeutic exercises 22 minutes including: Supervised TE  -  Wrist flexion stretch gentle stretch from other hand X 10 sec holds x 10 reps   Isometric wrist strengthening X 5 sec holds x 10  (flexion in FA neutral)   Ext/flx/RD/UD   Isometric  strengthening with tennis ball X 3-5sec holds x 10   Wrist dextericiser X 3 min NT due to pain   Elbow PRE flexion palm up only X 3# x 3/15   Shoulder T-band Red  Lats , Rows 2/15 NT 2/2 pain            Kinesiology tape application: FA in pronation, applied space correction with 75 %stretch in middle and no stretch on ends ulnarly around wrist. Pt. Verbalized understanding of wear, care, and precautions.        Home Exercises and Education Provided     Education provided: Continued attempt to not grasp, lift, or carry items in palm up position (FA supination). Pt. Verbalized understanding.   - Progress towards goals     Written Home Exercises Provided: Patient instructed to cont prior HEP.- Perform Wrist flexion isometrics with FA neutral position.   Exercises were  reviewed and Joy was able to demonstrate them prior to the end of the session.  Joy demonstrated good  understanding of the education provided.   .   See EMR under Patient Instructions for exercises provided prior visit.     Assessment     Joy Singer is a 63 y.o. female referred to outpatient occupational/hand therapy and presents with a medical diagnosis of right wrist tendinitis about FCU. She reports compliance with HEP. She reports soreness and pain in the wrist along the FCU and ulnarly as well. . Cont US this session. Increased proximal strengthening activities with good participation. Pt tolerated tx well.  Pt reports KT helps with pain therefore continue taping.     Joy is progressing well towards her goals and there are no updates to goals at this time. Pt prognosis continues as Excellent. Pt will continue to benefit from skilled outpatient occupational therapy to address the deficits listed in the problem list on initial evaluation, provide pt/family education and to maximize pt's level of independence in the home and community environment.     Anticipated barriers to continued occupational therapy: none    Pt's spiritual, cultural and educational needs considered and pt agreeable to plan of care and goals.    Goals     The following goals were discussed with the patient and patient is in agreement with them as to be addressed in the treatment plan.   Long Term Goals (LTGs); to be met by discharge.  LTG #1: Pt will report a pain level of 0 out of 10 with ADLs     LTG #2: Pt will demo improved FOTO score by 20 points.   LTG #3: Pt will return to prior level of function for ADLs and household management.      Short Term Goals (STGs); to be met within 4 weeks (12/07/2018).  STG #1a: Pt will report 3 out of 10 pain level with ADLs.  STG #2a: Pt will report/demo [place] with self care ADLs.  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  STG #3a: Pt will demonstrate  independence with issued HEP.   STG #3b: Pt will demo improved  strength by 10# to improve independence in ADLs    Plan     Continue skilled occupational therapy with individualized plan of care 2x/week for 6 weeks from 11/09/2018 to 12/21/2018.    Discussed Plan of Care with patient: Yes  Updates/Grading for next session: cont to progress with strengthening as able    Maria L Royal, OTD, OTR/L, CHT   Occupational therapist, Certified Hand Therapist

## 2018-12-03 ENCOUNTER — CLINICAL SUPPORT (OUTPATIENT)
Dept: REHABILITATION | Facility: HOSPITAL | Age: 63
End: 2018-12-03
Payer: MEDICARE

## 2018-12-03 DIAGNOSIS — M25.531 PAIN IN RIGHT WRIST: ICD-10-CM

## 2018-12-03 PROCEDURE — 97140 MANUAL THERAPY 1/> REGIONS: CPT | Mod: PN

## 2018-12-03 PROCEDURE — 97110 THERAPEUTIC EXERCISES: CPT | Mod: PN

## 2018-12-03 PROCEDURE — 97018 PARAFFIN BATH THERAPY: CPT | Mod: PN,59

## 2018-12-03 NOTE — PROGRESS NOTES
"  Occupational Therapy Daily Treatment Note      Date: 12/3/2018  Name: Joy Singer  Clinic Number: [id number]     Medical Diagnosis: Right wrist tendinitis  Date of Onset: 3-6 months ago  Date of Surgery: NA  Surgical Procedure: NA  Therapy Diagnosis:        Encounter Diagnosis   Name Primary?    Pain in right wrist        Precautions: Standard     Physician: Jackie Flanagan, *  Physician Orders: eval and treat right wrist tendinitis  Date of Return to MD: PRN     Evaluation Date: 11/9/2018  Plan of Care Certification Period: 12/07/2018     Visit #: / Visits authorized: 6/18  Insurance Authorization period Expiration: 12/31/2018    Time In: 9 AM  Time Out: 10 AM  Total Billable Time:  55 min  Charges for this Visit: Px1, MTx1, TE x 2    Subjective     Pt reports: "I'm feeling much better"  she was compliant with home exercise program given last session.   Response to previous treatment:Pt. Reports feeling better after massage over ulnar side last session  Functional change: Carrying more stuff, using it more.     Pain: 3/10  Location: right ulnar side    Objective          Elbow and Wrist ROM.  WNL       Strength (Dynamometer) and Pinch Strength (Pinch Gauge)  Measured in pounds.    11/9/2018 11/9/2018 11/20/2018 12/03/2018     Left Right Right Right   Rung II 60 45 50 (+5) 52 (+2)   Uribe Pinch 15 16 WNL    3pt Pinch 17 15 WNL       Sensation : intact per report     Manual Muscle Test    11/9/2018 11/9/2018     Left Right   Wrist Extension  5/5 5/5   Wrist Flexion 5/5 4+/5 pain   Radial Deviation 5/5 5/5   Ulnar Deviation 5/5 4/5 pain   Supination 5/5 4/5 pain   Pronation 5/5 5/5      CMS Impairment/Limitation/Restriction for FOTO Survey  Therapist reviewed FOTO scores for Joy Singer.  FOTO documents entered into [employer] - see Media section.     Intake Limitation Score: 47% - 11/9/2018  6th visit Limitation Score: 44%-12/03/2018  Category: Self Care     Current : CK = at least 40% but < 60% " impaired, limited or restricted  Goal: CJ = at least 20% but < 40% impaired, limited or restricted  Discharge:TBD             Joy received the following supervised modalities after being cleared for contradictions for 15 minutes:   -Patient received paraffin bath with MHP to Right wrist/ hand to increase blood flow, circulation, pain management and for tissue elasticity prior to therex.         Joy received the following direct contact modalities after being cleared for contraindications for 8 minutes:    -Patient received ultrasound to  FCU tendon insertion and volar wrist area to increase blood flow, circulation, tissue elasticity, pain management and for wound/scar management for 8 minutes @ 3 Mhz, Intensity 0.8 w/cm2 at 50% duty cycle.        Joy received the following manual therapy techniques for 10 minutes:   Cross friction massage to FCU area with STM to volar FA  -     Joy received therapeutic exercises 22 minutes including: Supervised TE  -  Wrist flexion stretch gentle ( wihthout stretch from other hand) X 10 sec holds x 10 reps   Isometric wrist strengthening X 5 sec holds x 10  (flexion in FA neutral)   Ext/flx/RD/UD   Isometric  strengthening with tennis ball X 3-5sec holds x 10   Wrist dextericiser X 3 min    Elbow PRE flexion palm up only X 3# x 3/15   Shoulder T-band Red  Lats , Rows 2/15 NT 2/2 pain            Kinesiology tape application: FA in pronation, applied space correction with 75 %stretch in middle and no stretch on ends ulnarly around wrist. Pt. Verbalized understanding of wear, care, and precautions.        Home Exercises and Education Provided     Education provided: Continued attempt to not grasp, lift, or carry items in palm up position (FA supination). Pt. Verbalized understanding.   - Progress towards goals     Written Home Exercises Provided: Patient instructed to cont prior HEP.- Perform Wrist flexion isometrics with FA neutral position.   Exercises were reviewed  and Joy was able to demonstrate them prior to the end of the session.  Joy demonstrated good  understanding of the education provided.   .   See EMR under Patient Instructions for exercises provided prior visit.     Assessment     Joy Singer is a 63 y.o. female referred to outpatient occupational/hand therapy and presents with a medical diagnosis of right wrist tendinitis about FCU. She reports compliance with HEP. She reports soreness and pain in the wrist along the FCU and ulnarly as well. . Cont US this session.Pt reports KT helps with pain therefore continue taping. Pt. Reports she is still performing things even if they are painful but overall she feels it is less painful. Re-assessment with 2 #increase in  strength noted this date. Able to perform wrist maze without pain today. Attempted increase in elbow PRE but pt reports pain with higher weight. Pt. Continues to report pain with pushing up off hands and opening tight jars with the right hand.     Joy is progressing well towards her goals and there are no updates to goals at this time. Pt prognosis continues as Excellent. Pt will continue to benefit from skilled outpatient occupational therapy to address the deficits listed in the problem list on initial evaluation, provide pt/family education and to maximize pt's level of independence in the home and community environment.     Anticipated barriers to continued occupational therapy: none    Pt's spiritual, cultural and educational needs considered and pt agreeable to plan of care and goals.    Goals     The following goals were discussed with the patient and patient is in agreement with them as to be addressed in the treatment plan.   Long Term Goals (LTGs); to be met by discharge.  LTG #1: Pt will report a pain level of 0 out of 10 with ADLs     LTG #2: Pt will demo improved FOTO score by 20 points.   LTG #3: Pt will return to prior level of function for ADLs and household management.       Short Term Goals (STGs); to be met within 4 weeks (12/07/2018).  STG #1a: Pt will report 3 out of 10 pain level with ADLs.  STG #2a: Pt will report/demo [place] with self care ADLs.  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  STG #3a: Pt will demonstrate independence with issued HEP.   STG #3b: Pt will demo improved  strength by 10# to improve independence in ADLs    Plan     Continue skilled occupational therapy with individualized plan of care 2x/week for 6 weeks from 11/09/2018 to 12/21/2018.    Discussed Plan of Care with patient: Yes  Updates/Grading for next session: cont to progress with strengthening as able    Maria L Royal, OTAKIL, OTR/L, CHT   Occupational therapist, Certified Hand Therapist

## 2018-12-05 ENCOUNTER — CLINICAL SUPPORT (OUTPATIENT)
Dept: REHABILITATION | Facility: HOSPITAL | Age: 63
End: 2018-12-05
Payer: MEDICARE

## 2018-12-05 ENCOUNTER — TELEPHONE (OUTPATIENT)
Dept: SURGERY | Facility: CLINIC | Age: 63
End: 2018-12-05

## 2018-12-05 DIAGNOSIS — M25.531 PAIN IN RIGHT WRIST: ICD-10-CM

## 2018-12-05 PROCEDURE — 97140 MANUAL THERAPY 1/> REGIONS: CPT | Mod: PN

## 2018-12-05 PROCEDURE — 97018 PARAFFIN BATH THERAPY: CPT | Mod: PN

## 2018-12-05 PROCEDURE — 97110 THERAPEUTIC EXERCISES: CPT | Mod: PN

## 2018-12-05 NOTE — PROGRESS NOTES
"  Occupational Therapy Daily Treatment Note      Date: 12/5/2018  Name: Joy Singer  Clinic Number: [id number]     Medical Diagnosis: Right wrist tendinitis  Date of Onset: 3-6 months ago  Date of Surgery: NA  Surgical Procedure: NA  Therapy Diagnosis:        Encounter Diagnosis   Name Primary?    Pain in right wrist        Precautions: Standard     Physician: Jackie Falnagan, *  Physician Orders: eval and treat right wrist tendinitis  Date of Return to MD: PRN     Evaluation Date: 11/9/2018  Plan of Care Certification Period: 12/07/2018     Visit #: / Visits authorized: 7/18  Insurance Authorization period Expiration: 12/31/2018    Time In: 9 AM  Time Out: 10 AM  Total Billable Time:  55 min  Charges for this Visit: Px1, MTx1, TE x 2    Subjective     Pt reports: "The only thing I'm still having trouble with really is pushing up on it or trying to  and carry my laptop from the bottom ( demonstrating FA supination position)  she was compliant with home exercise program given last session.   Response to previous treatment:Pt. Reports feeling better after massage over ulnar side last session  Functional change: Carrying more stuff, using it more.     Pain: 3/10  Location: right ulnar side    Objective          Elbow and Wrist ROM.  WNL       Strength (Dynamometer) and Pinch Strength (Pinch Gauge)  Measured in pounds.    11/9/2018 11/9/2018 11/20/2018 12/03/2018     Left Right Right Right   Rung II 60 45 50 (+5) 52 (+2)   Uribe Pinch 15 16 WNL    3pt Pinch 17 15 WNL       Sensation : intact per report     Manual Muscle Test    11/9/2018 11/9/2018     Left Right   Wrist Extension  5/5 5/5   Wrist Flexion 5/5 4+/5 pain   Radial Deviation 5/5 5/5   Ulnar Deviation 5/5 4/5 pain   Supination 5/5 4/5 pain   Pronation 5/5 5/5      CMS Impairment/Limitation/Restriction for FOTO Survey  Therapist reviewed FOTO scores for Joy Singer.  FOTO documents entered into [employer] - see Media section.     Intake " Limitation Score: 47% - 11/9/2018  6th visit Limitation Score: 44%-12/03/2018  Category: Self Care     Current : CK = at least 40% but < 60% impaired, limited or restricted  Goal: CJ = at least 20% but < 40% impaired, limited or restricted  Discharge:TBD             Joy received the following supervised modalities after being cleared for contradictions for 15 minutes:   -Patient received paraffin bath with MHP to Right wrist/ hand to increase blood flow, circulation, pain management and for tissue elasticity prior to therex.         Joy received the following direct contact modalities after being cleared for contraindications for 8 minutes:    -Patient received ultrasound to  FCU tendon insertion and volar wrist area to increase blood flow, circulation, tissue elasticity, pain management and for wound/scar management for 8 minutes @ 3 Mhz, Intensity 0.8 w/cm2 at 50% duty cycle.        Joy received the following manual therapy techniques for 10 minutes:   Cross friction massage to FCU area with STM to volar FA  -     Joy received therapeutic exercises 22 minutes including: Supervised TE  -  Wrist flexion stretch gentle ( wihthout stretch from other hand) X 10 sec holds x 10 reps   Isometric wrist strengthening X 5 sec holds x 10  (flexion in FA neutral)   Ext/flx/RD/UD   Isometric  strengthening with tennis ball X 3-5sec holds x 10   Wrist dextericiser X 3 min    Elbow PRE flexion palm up only X 3# x 3/15                 Home Exercises and Education Provided     Education provided: Continued attempt to not grasp, lift, or carry items in palm up position (FA supination). Pt. Verbalized understanding.   - Progress towards goals     Written Home Exercises Provided: Patient instructed to cont prior HEP.- Perform Wrist flexion isometrics with FA neutral position.   Exercises were reviewed and Joy was able to demonstrate them prior to the end of the session.  Joy demonstrated good  understanding of  the education provided.   .   See EMR under Patient Instructions for exercises provided prior visit.     Assessment     Joy Singer is a 63 y.o. female referred to outpatient occupational/hand therapy and presents with a medical diagnosis of right wrist tendinitis about FCU. She reports compliance with HEP. She reports soreness and pain in the wrist along the FCU and ulnarly as well. . Cont US this session.Pt reports KT helps with pain - pt to trial ulnar wrist support on loan from therapist over the weekend. Able to perform wrist maze  With only slight soreness at the end of the time. .     Joy is progressing well towards her goals and there are no updates to goals at this time. Pt prognosis continues as Excellent. Pt will continue to benefit from skilled outpatient occupational therapy to address the deficits listed in the problem list on initial evaluation, provide pt/family education and to maximize pt's level of independence in the home and community environment.     Anticipated barriers to continued occupational therapy: none    Pt's spiritual, cultural and educational needs considered and pt agreeable to plan of care and goals.    Goals     The following goals were discussed with the patient and patient is in agreement with them as to be addressed in the treatment plan.   Long Term Goals (LTGs); to be met by discharge.  LTG #1: Pt will report a pain level of 0 out of 10 with ADLs     LTG #2: Pt will demo improved FOTO score by 20 points.   LTG #3: Pt will return to prior level of function for ADLs and household management.      Short Term Goals (STGs); to be met within 4 weeks (12/07/2018).  STG #1a: Pt will report 3 out of 10 pain level with ADLs.  STG #2a: Pt will report/demo [place] with self care ADLs.  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  STG #3a: Pt will demonstrate independence with issued HEP.   STG #3b: Pt will demo improved  strength by 10# to  improve independence in ADLs    Plan     Continue skilled occupational therapy with individualized plan of care 2x/week for 6 weeks from 11/09/2018 to 12/21/2018.    Discussed Plan of Care with patient: Yes  Updates/Grading for next session: cont to progress with strengthening as able    FAVIOLA Her, OTR/L, CHT   Occupational therapist, Certified Hand Therapist

## 2018-12-05 NOTE — TELEPHONE ENCOUNTER
Left VM with my direct contact information, patient advised to give a return call with any questions or concerns regarding mammogram, mammogram order is in Epic and was placed on 11-13-18

## 2018-12-10 ENCOUNTER — CLINICAL SUPPORT (OUTPATIENT)
Dept: REHABILITATION | Facility: HOSPITAL | Age: 63
End: 2018-12-10
Payer: MEDICARE

## 2018-12-10 DIAGNOSIS — M25.531 PAIN IN RIGHT WRIST: ICD-10-CM

## 2018-12-10 PROCEDURE — 97110 THERAPEUTIC EXERCISES: CPT | Mod: PN

## 2018-12-10 PROCEDURE — 97018 PARAFFIN BATH THERAPY: CPT | Mod: PN,59

## 2018-12-10 PROCEDURE — 97140 MANUAL THERAPY 1/> REGIONS: CPT | Mod: PN

## 2018-12-10 NOTE — PROGRESS NOTES
"  Occupational Therapy Daily Treatment Note      Date: 12/10/2018  Name: Joy Singer  Clinic Number: [id number]     Medical Diagnosis: Right wrist tendinitis  Date of Onset: 3-6 months ago  Date of Surgery: NA  Surgical Procedure: NA  Therapy Diagnosis:        Encounter Diagnosis   Name Primary?    Pain in right wrist        Precautions: Standard     Physician: Jackie Flanagan, *  Physician Orders: eval and treat right wrist tendinitis  Date of Return to MD: PRN     Evaluation Date: 11/9/2018  Plan of Care Certification Period: 12/07/2018     Visit #: / Visits authorized: 8/18  Insurance Authorization period Expiration: 12/31/2018    Time In: 905 AM  Time Out: 955 AM  Total Billable Time:  50 min  Charges for this Visit: Px1, MTx1, TE x 1    Subjective     Pt reports: " It was hurting- I tried using the splint but it made it hurt so I didn't wear it. I had to do a lot of yard work raking the leaves and then I put the tree together."   she was compliant with home exercise program given last session.   Response to previous treatment:Pt. Reports feeling better after massage over ulnar side last session  Functional change: Carrying more stuff, using it more.     Pain: 3/10  Location: right ulnar side- when I use it " depends on what I do with it"     Objective          Elbow and Wrist ROM.  WNL       Strength (Dynamometer) and Pinch Strength (Pinch Gauge)  Measured in pounds.    11/9/2018 11/9/2018 11/20/2018 12/03/2018     Left Right Right Right   Rung II 60 45 50 (+5) 52 (+2)   Uribe Pinch 15 16 WNL    3pt Pinch 17 15 WNL       Sensation : intact per report     Manual Muscle Test    11/9/2018 11/9/2018     Left Right   Wrist Extension  5/5 5/5   Wrist Flexion 5/5 4+/5 pain   Radial Deviation 5/5 5/5   Ulnar Deviation 5/5 4/5 pain   Supination 5/5 4/5 pain   Pronation 5/5 5/5      CMS Impairment/Limitation/Restriction for FOTO Survey  Therapist reviewed FOTO scores for Joy Singer.  FOTO documents " entered into [employer] - see Media section.     Intake Limitation Score: 47% - 11/9/2018  6th visit Limitation Score: 44%-12/03/2018  Category: Self Care     Current : CK = at least 40% but < 60% impaired, limited or restricted  Goal: CJ = at least 20% but < 40% impaired, limited or restricted  Discharge:TBD             Joy received the following supervised modalities after being cleared for contradictions for 10 minutes:   -Patient received paraffin bath with MHP to Right wrist/ hand to increase blood flow, circulation, pain management and for tissue elasticity prior to therex.         Joy received the following direct contact modalities after being cleared for contraindications for 8 minutes:    -Patient received ultrasound to  FCU tendon insertion and volar wrist area to increase blood flow, circulation, tissue elasticity, pain management and for wound/scar management for 8 minutes @ 3 Mhz, Intensity 0.8 w/cm2 at 50% duty cycle.        Joy received the following manual therapy techniques for 10 minutes:   Cross friction massage to FCU area with STM to volar FA  -     Joy received therapeutic exercises 22 minutes including:  -  Wrist flexion stretch gentle ( wihthout stretch from other hand) X 10 sec holds x 10 reps   Isometric wrist strengthening X 5 sec holds x 10  (flexion in FA neutral)   Ext/flx/RD/UD   Isometric  strengthening with tennis ball X 3-5sec holds x 10   Wrist dextericiser X 2 min    Elbow PRE flexion palm up only X 3# x 3/15   Red flex bar X 2/10 smiles only   Wrist PRE 500g green ball, 3/15 wrist ext/flx             Home Exercises and Education Provided     Education provided: Continued attempt to not grasp, lift, or carry items in palm up position (FA supination). Pt. Verbalized understanding.   - Progress towards goals     Written Home Exercises Provided: Patient instructed to cont prior HEP.- Perform Wrist flexion isometrics with FA neutral position.   Exercises were reviewed and  Joy was able to demonstrate them prior to the end of the session.  Joy demonstrated good  understanding of the education provided.   .   See EMR under Patient Instructions for exercises provided prior visit.     Assessment     Joy Singer is a 63 y.o. female referred to outpatient occupational/hand therapy and presents with a medical diagnosis of right wrist tendinitis about FCU. She reports compliance with HEP. She reports soreness and pain in the wrist along the FCU and ulnarly as well. . Cont US this session.Pt reports KT helps with pain - pt to trial ulnar wrist support on loan from therapist over the weekend. Able to perform wrist maze  With only slight soreness at the end of the time. Added light strengthening with good participation.      Joy is progressing well towards her goals and there are no updates to goals at this time. Pt prognosis continues as Excellent. Pt will continue to benefit from skilled outpatient occupational therapy to address the deficits listed in the problem list on initial evaluation, provide pt/family education and to maximize pt's level of independence in the home and community environment.     Anticipated barriers to continued occupational therapy: none    Pt's spiritual, cultural and educational needs considered and pt agreeable to plan of care and goals.    Goals     The following goals were discussed with the patient and patient is in agreement with them as to be addressed in the treatment plan.   Long Term Goals (LTGs); to be met by discharge.  LTG #1: Pt will report a pain level of 0 out of 10 with ADLs     LTG #2: Pt will demo improved FOTO score by 20 points.   LTG #3: Pt will return to prior level of function for ADLs and household management.      Short Term Goals (STGs); to be met within 4 weeks (12/07/2018).  STG #1a: Pt will report 3 out of 10 pain level with ADLs.  STG #2a: Pt will report/demo [place] with self care ADLs.  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  STG #3a: Pt will demonstrate independence with issued HEP.   STG #3b: Pt will demo improved  strength by 10# to improve independence in ADLs    Plan     Continue skilled occupational therapy with individualized plan of care 2x/week for 6 weeks from 11/09/2018 to 12/21/2018.    Discussed Plan of Care with patient: Yes  Updates/Grading for next session: cont to progress with strengthening as able    Maria L Royal, FAVIOLA, OTR/L, CHT   Occupational therapist, Certified Hand Therapist

## 2018-12-19 ENCOUNTER — CLINICAL SUPPORT (OUTPATIENT)
Dept: REHABILITATION | Facility: HOSPITAL | Age: 63
End: 2018-12-19
Payer: MEDICARE

## 2018-12-19 DIAGNOSIS — M25.531 PAIN IN RIGHT WRIST: ICD-10-CM

## 2018-12-19 PROCEDURE — 97018 PARAFFIN BATH THERAPY: CPT | Mod: PN

## 2018-12-19 PROCEDURE — 97110 THERAPEUTIC EXERCISES: CPT | Mod: PN

## 2018-12-19 PROCEDURE — 97140 MANUAL THERAPY 1/> REGIONS: CPT | Mod: PN

## 2018-12-19 NOTE — PATIENT INSTRUCTIONS
[facility] [place]  [phone]  SUNNY CORDOVA, OTAKIL, OTR/L, CHT  OCCUPATIONAL THERAPIST, CERTIFIED HAND THERAPIST

## 2018-12-19 NOTE — PROGRESS NOTES
"  Occupational Therapy Daily Treatment Note      Date: 12/19/2018  Name: Joy Singer  Clinic Number: [id number]     Medical Diagnosis: Right wrist tendinitis  Date of Onset: 3-6 months ago  Date of Surgery: NA  Surgical Procedure: NA  Therapy Diagnosis:        Encounter Diagnosis   Name Primary?    Pain in right wrist        Precautions: Standard     Physician: Jackie Flanagan, *  Physician Orders: eval and treat right wrist tendinitis  Date of Return to MD: PRN     Evaluation Date: 11/9/2018  Plan of Care Certification Period: 12/07/2018     Visit #: / Visits authorized: 9/18  Insurance Authorization period Expiration: 12/31/2018    Time In: 9 AM  Time Out: 955 AM  Total Billable Time:  45 min  Charges for this Visit: Px1, MTx1, TE x 1    Subjective     Pt reports: " It still hurts- I did a lot of chopping, and wrapping presents. I fell last week and I don't think I fell on it but my whole right side is bruised so I could have" Pt. States it hurts less than when she started therapy but that she is still limited by pain.    she was compliant with home exercise program given last session.   Response to previous treatment:Pt. Reports feeling better after massage over ulnar side last session  Functional change: Carrying more stuff, using it more.     Pain: 3/10  Location: right ulnar side- when I use it " depends on what I do with it"     Objective          Elbow and Wrist ROM.  WNL       Strength (Dynamometer) and Pinch Strength (Pinch Gauge)  Measured in pounds.    11/9/2018 11/9/2018 11/20/2018 12/03/2018 12/19/2018     Left Right Right Right Right   Rung II 60 45 50 (+5) 52 (+2) 58 (+6)   Uribe Pinch 15 16 WNL     3pt Pinch 17 15 WNL        Sensation : intact per report     Manual Muscle Test    11/9/2018 11/9/2018     Left Right   Wrist Extension  5/5 5/5   Wrist Flexion 5/5 4+/5 pain   Radial Deviation 5/5 5/5   Ulnar Deviation 5/5 4/5 pain   Supination 5/5 4/5 pain   Pronation 5/5 5/5      CMS " Impairment/Limitation/Restriction for FOTO Survey  Therapist reviewed FOTO scores for Joy Singer.  FOTO documents entered into Who@ - see Media section.     Intake Limitation Score: 47% - 11/9/2018  6th visit Limitation Score: 44%-12/03/2018  Category: Self Care     Current : CK = at least 40% but < 60% impaired, limited or restricted  Goal: CJ = at least 20% but < 40% impaired, limited or restricted  Discharge:TBD             Joy received the following supervised modalities after being cleared for contradictions for 10 minutes:   -Patient received paraffin bath with MHP to Right wrist/ hand to increase blood flow, circulation, pain management and for tissue elasticity prior to therex.         Joy received the following direct contact modalities after being cleared for contraindications for 8 minutes:    -Patient received ultrasound to  FCU tendon insertion and volar wrist area to increase blood flow, circulation, tissue elasticity, pain management and for wound/scar management for 8 minutes @ 3 Mhz, Intensity 0.8 w/cm2 at 50% duty cycle.        Joy received the following manual therapy techniques for 10 minutes:   Cross friction massage to FCU area with STM to volar FA  -     Joy received therapeutic exercises 22 minutes including:  -  Wrist flexion stretch gentle ( wihthout stretch from other hand) X 10 sec holds x 10 reps   Isometric wrist strengthening X 5 sec holds x 10  (flexion in FA neutral)   Ext/flx/RD/UD   Isometric  strengthening with tennis ball X 3-5sec holds x 10   Wrist dextericiser X 2 min    Elbow PRE flexion palm up only X 3# x 3/15   Red flex bar X 2/10 smiles only   500g green ball wrist flx/ext X 2/10   Theraputty HEP 3 min molding  X 10 squeezes  X 5 pancake/bloom  X 3 logs key pinch  X 3 logs 3pt pinch             Home Exercises and Education Provided     Education provided: Continued attempt to not grasp, lift, or carry items in palm up position (FA supination). Pt.  Verbalized understanding.   - Progress towards goals     Written Home Exercises Provided: yes.- yellow theraputty provided for home use and theraputty HEP.    Exercises were reviewed and Joy was able to demonstrate them prior to the end of the session.  Joy demonstrated good  understanding of the education provided.   .   See EMR under Patient Instructions for exercises provided 12/19/2018.     Assessment     Joy Singer is a 63 y.o. female referred to outpatient occupational/hand therapy and presents with a medical diagnosis of right wrist tendinitis about FCU. She reports compliance with HEP.  She feels limited mostly by pain. She reports she used it to do a lot of chopping and wrapping presents over the weekend and it hurt after she finished that.  with significant increase today. Discussed with pt nature of tendinitis and importance of letting it rest during periods of pain. She thinks it hurts less than it did at the start but it still hurts. Cont US this session. Added soft theraputty to HEP without complaints after.    Joy is progressing well towards her goals and there are no updates to goals at this time. Pt prognosis continues as Excellent. Pt will continue to benefit from skilled outpatient occupational therapy to address the deficits listed in the problem list on initial evaluation, provide pt/family education and to maximize pt's level of independence in the home and community environment.     Anticipated barriers to continued occupational therapy: none    Pt's spiritual, cultural and educational needs considered and pt agreeable to plan of care and goals.    Goals     The following goals were discussed with the patient and patient is in agreement with them as to be addressed in the treatment plan.   Long Term Goals (LTGs); to be met by discharge.  LTG #1: Pt will report a pain level of 0 out of 10 with ADLs     LTG #2: Pt will demo improved FOTO score by 20 points.   LTG #3: Pt will  return to prior level of function for ADLs and household management.      Short Term Goals (STGs); to be met within 4 weeks (12/07/2018).  STG #1a: Pt will report 3 out of 10 pain level with ADLs.  STG #2a: Pt will report/demo [place] with self care ADLs.  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  STG #3a: Pt will demonstrate independence with issued HEP.   STG #3b: Pt will demo improved  strength by 10# to improve independence in ADLs    Plan     Pt. To be seen 1 more x and then f/u with MD due to not feeling much symptom relief. Pt. In agreement.     Continue skilled occupational therapy with individualized plan of care 2x/week for 6 weeks from 11/09/2018 to 12/21/2018.    Discussed Plan of Care with patient: Yes  Updates/Grading for next session: cont to progress with strengthening as able    Maria L Royal, FAVIOLA, OTR/L, CHT   Occupational therapist, Certified Hand Therapist

## 2018-12-21 ENCOUNTER — CLINICAL SUPPORT (OUTPATIENT)
Dept: REHABILITATION | Facility: HOSPITAL | Age: 63
End: 2018-12-21
Payer: MEDICARE

## 2018-12-21 DIAGNOSIS — M25.531 PAIN IN RIGHT WRIST: ICD-10-CM

## 2018-12-21 PROCEDURE — G8987 SELF CARE CURRENT STATUS: HCPCS | Mod: CK,PN

## 2018-12-21 PROCEDURE — 97018 PARAFFIN BATH THERAPY: CPT | Mod: PN,59

## 2018-12-21 PROCEDURE — G8989 SELF CARE D/C STATUS: HCPCS | Mod: CK,PN

## 2018-12-21 PROCEDURE — 97140 MANUAL THERAPY 1/> REGIONS: CPT | Mod: PN

## 2018-12-21 PROCEDURE — G8988 SELF CARE GOAL STATUS: HCPCS | Mod: CK,PN

## 2018-12-21 PROCEDURE — 97110 THERAPEUTIC EXERCISES: CPT | Mod: PN

## 2018-12-21 NOTE — PROGRESS NOTES
"  Occupational Therapy Daily Treatment Note      Date: 12/21/2018  Name: Joy Singer  Clinic Number: [id number]     Medical Diagnosis: Right wrist tendinitis  Date of Onset: 3-6 months ago  Date of Surgery: NA  Surgical Procedure: NA  Therapy Diagnosis:        Encounter Diagnosis   Name Primary?    Pain in right wrist        Precautions: Standard     Physician: Jackie Flanagan, *  Physician Orders: eval and treat right wrist tendinitis  Date of Return to MD: PRN     Evaluation Date: 11/9/2018  Plan of Care Certification Period: 12/07/2018     Visit #: / Visits authorized: 10/18  Insurance Authorization period Expiration: 12/31/2018    Time In: 9 AM  Time Out: 955 AM  Total Billable Time:  45 min  Charges for this Visit: Px1, MTx1, TE x 2    Subjective     Pt reports: " It still hurts- doing certain things"  Pt. States it hurts less than when she started therapy but that she is still limited by pain.    she was compliant with home exercise program given last session.   Response to previous treatment:Pt. Reports feeling better after massage over ulnar side last session  Functional change: Carrying more stuff, using it more.     Pain: 3/10  Location: right ulnar side- when I use it " depends on what I do with it"     Objective          Elbow and Wrist ROM.  WNL       Strength (Dynamometer) and Pinch Strength (Pinch Gauge)  Measured in pounds.    11/9/2018 11/9/2018 11/20/2018 12/03/2018 12/19/2018     Left Right Right Right Right   Rung II 60 45 50 (+5) 52 (+2) 58 (+6)   Uribe Pinch 15 16 WNL     3pt Pinch 17 15 WNL        Sensation : intact per report     Manual Muscle Test    11/9/2018 11/9/2018     Left Right   Wrist Extension  5/5 5/5   Wrist Flexion 5/5 4+/5 pain   Radial Deviation 5/5 5/5   Ulnar Deviation 5/5 4/5 pain   Supination 5/5 4/5 pain   Pronation 5/5 5/5      CMS Impairment/Limitation/Restriction for FOTO Survey  Therapist reviewed FOTO scores for Joy Singer.  FOTO documents entered " into [employer] - see Media section.     Intake Limitation Score: 47% - 11/9/2018  6th visit Limitation Score: 44%-12/03/2018  10th visit limitation Score: 48-12/21/2018  Category: Self Care     Current : CK = at least 40% but < 60% impaired, limited or restricted  Goal: CJ = at least 20% but < 40% impaired, limited or restricted  Discharge:TBD             Joy received the following supervised modalities after being cleared for contradictions for 10 minutes:   -Patient received paraffin bath with MHP to Right wrist/ hand to increase blood flow, circulation, pain management and for tissue elasticity prior to therex.         Joy received the following direct contact modalities after being cleared for contraindications for 8 minutes:    -Patient received ultrasound to  FCU tendon insertion and volar wrist area to increase blood flow, circulation, tissue elasticity, pain management and for wound/scar management for 8 minutes @ 3 Mhz, Intensity 0.8 w/cm2 at 50% duty cycle.        Joy received the following manual therapy techniques for 10 minutes:   Cross friction massage to FCU area with STM to volar FA  -     Joy received therapeutic exercises 22 minutes including:  -  Wrist flexion/extension stretch gentle ( wihthout stretch from other hand) X 10 sec holds x 10 reps   Isometric wrist strengthening X 5 sec holds x 10  (flexion in FA neutral)   Ext/flx/RD/UD   Wrist dextericiser X 2 min    Elbow PRE flexion palm up only X 3# x 3/15   Red flex bar X 2/10 smiles only   500g green ball wrist flx/ext X 2/10   Theraputty HEP Yellow 3 min molding  X 10 squeezes  X 5 pancake/bloom  X 3 logs key pinch  X 3 logs 3pt pinch             Home Exercises and Education Provided     Education provided: Continued attempt to not grasp, lift, or carry items in palm up position (FA supination). Pt. Verbalized understanding.   - Progress towards goals     Written Home Exercises Provided: yes.- yellow theraputty provided for home use  and theraputty HEP.    Exercises were reviewed and Joy was able to demonstrate them prior to the end of the session.  Joy demonstrated good  understanding of the education provided.   .   See EMR under Patient Instructions for exercises provided 12/19/2018.     Assessment     Joy Singer is a 63 y.o. female referred to outpatient occupational/hand therapy and presents with a medical diagnosis of right wrist tendinitis about FCU. She reports compliance with HEP.  She feels limited mostly by pain. She reports she used it to do a lot of chopping and wrapping presents over the weekend and it hurt after she finished that.  with significant increase today. Discussed with pt nature of tendinitis and importance of letting it rest during periods of pain. She thinks it hurts less than it did at the start but it still hurts. Cont US this session. soft theraputty to HEP without complaints today's session. She reports she is having a lot of extra duties at home due to her  not being able to perform due to knee sx. She reports she hasn't really been able to just rest it with the holidays.       Anticipated barriers to continued occupational therapy: none    Pt's spiritual, cultural and educational needs considered and pt agreeable to plan of care and goals.    Goals     The following goals were discussed with the patient and patient is in agreement with them as to be addressed in the treatment plan.   Long Term Goals (LTGs); to be met by discharge.  LTG #1: Pt will report a pain level of 0 out of 10 with ADLs     LTG #2: Pt will demo improved FOTO score by 20 points.   LTG #3: Pt will return to prior level of function for ADLs and household management.      Short Term Goals (STGs); to be met within 4 weeks (12/07/2018).  STG #1a: Pt will report 3 out of 10 pain level with ADLs.  STG #2a: Pt will report/demo [place] with self care ADLs.  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  STG #3a: Pt will demonstrate independence with issued HEP.   STG #3b: Pt will demo improved  strength by 10# to improve independence in ADLs    Plan     Pt. To DC from skilled OT this date due to poor progress. Pt. Instructed to follow-up with MD and to try to rest with the splint for 2 good weeks after the holidays. Pt. Verbalized understanding.     Maria L Royal, RANDALLD, OTR/L, CHT   Occupational therapist, Certified Hand Therapist

## 2019-01-03 ENCOUNTER — OFFICE VISIT (OUTPATIENT)
Dept: SURGERY | Facility: CLINIC | Age: 64
End: 2019-01-03
Payer: MEDICARE

## 2019-01-03 ENCOUNTER — HOSPITAL ENCOUNTER (OUTPATIENT)
Dept: RADIOLOGY | Facility: HOSPITAL | Age: 64
Discharge: HOME | End: 2019-01-03
Attending: SURGERY
Payer: MEDICARE

## 2019-01-03 VITALS
BODY MASS INDEX: 47.09 KG/M2 | HEIGHT: 66 IN | SYSTOLIC BLOOD PRESSURE: 128 MMHG | DIASTOLIC BLOOD PRESSURE: 80 MMHG | HEART RATE: 66 BPM | TEMPERATURE: 99 F | WEIGHT: 293 LBS

## 2019-01-03 VITALS — BODY MASS INDEX: 47.09 KG/M2 | HEIGHT: 66 IN | WEIGHT: 293 LBS

## 2019-01-03 DIAGNOSIS — Z12.39 BREAST CANCER SCREENING: Primary | ICD-10-CM

## 2019-01-03 DIAGNOSIS — R59.0 AXILLARY LYMPHADENOPATHY: ICD-10-CM

## 2019-01-03 DIAGNOSIS — Z12.31 ENCOUNTER FOR SCREENING MAMMOGRAM FOR BREAST CANCER: ICD-10-CM

## 2019-01-03 DIAGNOSIS — N63.0 BREAST NODULE: ICD-10-CM

## 2019-01-03 DIAGNOSIS — Z12.39 BREAST CANCER SCREENING: ICD-10-CM

## 2019-01-03 DIAGNOSIS — Z91.89 AT HIGH RISK FOR BREAST CANCER: ICD-10-CM

## 2019-01-03 DIAGNOSIS — Z80.3 FAMILY HISTORY OF BREAST CANCER: ICD-10-CM

## 2019-01-03 DIAGNOSIS — N64.4 BREAST PAIN: ICD-10-CM

## 2019-01-03 PROCEDURE — 77062 BREAST TOMOSYNTHESIS BI: CPT | Mod: 26,,, | Performed by: RADIOLOGY

## 2019-01-03 PROCEDURE — 77066 DX MAMMO INCL CAD BI: CPT | Mod: 26,,, | Performed by: RADIOLOGY

## 2019-01-03 PROCEDURE — 99214 OFFICE O/P EST MOD 30 MIN: CPT | Mod: S$PBB,,, | Performed by: NURSE PRACTITIONER

## 2019-01-03 PROCEDURE — 77066 MAMMO DIGITAL DIAGNOSTIC BILAT WITH TOMOSYNTHESIS_CAD: ICD-10-PCS | Mod: 26,,, | Performed by: RADIOLOGY

## 2019-01-03 PROCEDURE — 99214 PR OFFICE/OUTPT VISIT, EST, LEVL IV, 30-39 MIN: ICD-10-PCS | Mod: S$PBB,,, | Performed by: NURSE PRACTITIONER

## 2019-01-03 PROCEDURE — 99999 PR PBB SHADOW E&M-EST. PATIENT-LVL III: ICD-10-PCS | Mod: PBBFAC,,, | Performed by: NURSE PRACTITIONER

## 2019-01-03 PROCEDURE — 77062 MAMMO DIGITAL DIAGNOSTIC BILAT WITH TOMOSYNTHESIS_CAD: ICD-10-PCS | Mod: 26,,, | Performed by: RADIOLOGY

## 2019-01-03 PROCEDURE — 77062 BREAST TOMOSYNTHESIS BI: CPT | Mod: TC,PO

## 2019-01-03 PROCEDURE — 76642 US BREAST BILATERAL LIMITED: ICD-10-PCS | Mod: 26,50,, | Performed by: RADIOLOGY

## 2019-01-03 PROCEDURE — 99213 OFFICE O/P EST LOW 20 MIN: CPT | Mod: PBBFAC,25,PO | Performed by: NURSE PRACTITIONER

## 2019-01-03 PROCEDURE — 76642 ULTRASOUND BREAST LIMITED: CPT | Mod: TC,50,PO

## 2019-01-03 PROCEDURE — 76642 ULTRASOUND BREAST LIMITED: CPT | Mod: 26,50,, | Performed by: RADIOLOGY

## 2019-01-03 PROCEDURE — 99999 PR PBB SHADOW E&M-EST. PATIENT-LVL III: CPT | Mod: PBBFAC,,, | Performed by: NURSE PRACTITIONER

## 2019-01-03 RX ORDER — GUAIFENESIN AND PHENYLEPHRINE HCL 385; 10 MG/1; MG/1
TABLET ORAL
Refills: 1 | COMMUNITY
Start: 2018-12-28 | End: 2019-03-26

## 2019-01-03 RX ORDER — BENZONATATE 200 MG/1
CAPSULE ORAL
Refills: 1 | COMMUNITY
Start: 2018-12-28 | End: 2019-03-26

## 2019-01-03 RX ORDER — CLARITHROMYCIN 500 MG/1
TABLET, FILM COATED ORAL
Refills: 1 | COMMUNITY
Start: 2018-12-28 | End: 2019-03-26

## 2019-01-03 RX ORDER — PROMETHAZINE HYDROCHLORIDE AND CODEINE PHOSPHATE 6.25; 1 MG/5ML; MG/5ML
SOLUTION ORAL
Refills: 0 | COMMUNITY
Start: 2018-12-28 | End: 2019-03-26

## 2019-01-03 NOTE — PROGRESS NOTES
"Patient ID: Joy Singer is a 63 y.o. female.    Chief Complaint: Breast Cancer Screening (CBE) and Breast Pain (Right Breast)        HPI:  Established patient of the Department of Breast Surgery, previously seen by Dr. Tori Wall, and new to me, presents today for breast cancer screening.    Breast History:    Pt reports hx of [facility]    Initially presented to Dr. Wall 11/2016 with history of abnormal mammogram and breast US.    Last saw Dr. Wall 6/4/2018 for right breast pain.    Personal history of breast cancer:  No    Personal history of breast biopsy:  Hx of mammotome biopsies with Dr. Seema Aguilar, several among both breasts, all benign per patient, pt states last was well over 2 years ago, pt has since transferred her breast-related care to Dr. Wall, pt does not recall ever being told she has LCIS or atypia/hyperplasia of the breast(s)    Personal history of breast surgery:  no    Interval Breast History    Patient reports that abovementioned right breast pain is on "entire right side of right breast" and has not changed in any way, no worse, no better.  Sometimes it goes away for days, some days it's worse than others.  She states, "I'm not here today for that, just for my annual screening."  Onset about 7 months ago.  Presently 2-3/10.  No associated mass or skin change.  Diclofenac cream and ice help to relieve pain.    Patient denies palpable breast mass, breast-related skin changes, nipple discharge and nipple retraction.  Denies breast pain other than abovementioned.    Breast Imaging    Right diag mmg and right breast limited US dated 6/7/18 obtained by Dr. Wall secondary to right breast pain and burning laterally and superiorly; report reviewed, w/ report reading:  BI-RADS 1 (negative)    Last bilat (diag) mmg 12/18/17 report reviewed, w/ report reading:  The breasts have scattered areas of fibroglandular density. There is no evidence of suspicious masses, calcifications, or other " abnormal findings.   Impression:  Bilateral  There is no mammographic evidence of malignancy.   BI-RADS Category:   Overall: 1 - Negative   Recommendation:  Routine screening mammogram in 1 year is recommended.   The patient's estimated lifetime risk of breast cancer (to age 85) based on Tyrer-Cuzick - 7 risk assessment model is: Tyrer-Cuzick: 13.07 %.     GYN History:  Age of menarche:  13 y/o  Uterus and ovaries:  S/p BSO at 48 y/o  Menopause:  Surgical at 48 y/o  HRT usage:  never  , first live birth at 26 y/o  Personal history of ovarian cancer:  no    Other Oncologic History:  Personal history of other cancer not abovementioned:  no  Personal history of genetic testing:  no  Personal history of thoracic radiation between 11 and 30 y/o:  no    Social History:  Tobacco use:  denies  Alcohol use:  Very seldom  Swims, hasn't exercised in a while, plans to resume    Family History:    Family Oncologic History    Ashkenazi Rastafari ancestry:  no    Family History   Problem Relation Age of Onset    Lung cancer Mother     Breast cancer Maternal Aunt         50s    Breast cancer Maternal Cousin 68        daughter of aunt dx'd in 50s    Breast cancer Maternal Cousin 64        mother unaffected    Cancer Paternal Grandmother         brain cancer    Breast cancer Maternal Aunt 68    Lung cancer Maternal Aunt     Lung cancer Maternal Uncle     Lung cancer Maternal Uncle     Lung cancer Maternal Uncle     Cancer Maternal Uncle         mesothelioma    Lung cancer Maternal Uncle         in addition to mesothelioma    Ovarian cancer Neg Hx        History of genetic testing in relatives:  Possibly 1 cousin, pt unsure of results    Patient's Breast Cancer Risk Assessment Scores:  Taking into account the above information, the patient's breast cancer risk assessment scores were calculated today as follows:  Tyrer-Cuzick lifetime risk:  19.1%  Anel model 5-year risk:  3.0%      Review of Systems - See  "HPI.  Objective:   Physical Exam   Pulmonary/Chest: She exhibits no mass, no edema and no deformity. Right breast exhibits no inverted nipple, no mass, no nipple discharge, no skin change and no tenderness. Left breast exhibits no inverted nipple, no mass, no nipple discharge, no skin change and no tenderness. Breasts are symmetrical. There is no breast swelling.   - Right breast 9:00 with 5-7mm round nodule. Pt states this is in area where she previously had a lump that at a later date "could not be found." TTP.    - Bilateral areolar regions and 6:00 regions with TTP.   Lymphadenopathy:     She has no cervical adenopathy.        Right: No supraclavicular adenopathy present.        Left: No supraclavicular adenopathy present.   Left axilla with possibly enlarged lymph node.  2cm mobile lesion appreciated; no similar finding on right axilla.  Bilat axillae TTP.     Assessment:       1. Breast cancer screening    2. At high risk for breast cancer    3. Family history of breast cancer    4. Breast nodule    5. Axillary lymphadenopathy    6. Breast pain          Plan:   Right breast 9:00 with 5-7mm round nodule. Pt states this is in area where she previously had a lump that at a later date "could not be found." TTP.  Area was marked for imaging.  Of low level of clinical suspicion.  Bilat diag mmg and bilat limited breast US today.  If imaging is normal, pt to RTC in 1 month to reassess.    Left axilla with possibly enlarged lymph node.  2cm mobile lesion appreciated; no similar finding on right axilla.  Bilat axillae TTP.   Other possible etiology includes pt's normal anatomy of left axilla.  If imaging today is normal, pt to RTC in 1 month to reassess.   Spoke w/ pt on phone after clinic visit, same day; pt denied fever, chills, nausea, vomiting, recurrent infections.    Right lateral breast pain  Likely of benign etiology.  Obtaining imaging today to rule out pathology in area of palpable concern.  If imaging is " normal, suspect pain is of benign etiology given that it is not frequent and is non-focal and is associated with no skin change.  Discussed management strategies with patient.  First-line therapy for this type of breast pain includes physical support of the breasts with the use of support garments and compression, which were discussed with the patient.  First-line therapy can also include pharmacologic management with oral acetaminophen or oral or topical NSAIDs.  Patient is to contact clinic or RTC with any changes pertaining to this problem at any time.      High-Risk Counseling    Counseled patient regarding her being at increased risk for breast cancer based on risk factors which patient and I discussed today and which were factored into the Tyrer-Cuzick risk assessment model, which estimated a lifetime risk of breast cancer of 19.1% and a Anel model 5-year risk of 3% for this patient as of today.  Discussed with patient that there are several models available for stratifying breast cancer risk.    Discussed with patient that many factors can influence an individuals risk for breast cancer, including both modifiable and non-modifiable risk factors.  Discussed with patient that several elements increasing her risk for breast cancer are non-modifiable, such as family history; however, her modifiable breast cancer risk factors include obesity.      Counseled patient regarding modifiable risk factors for breast cancer as recommended by NCCN, including exercising, maintaining a healthy BMI, limiting alcohol consumption to less than one drink per day, and refraining from smoking.      Discussed with patient current NCCN guideline recommendations for increased breast cancer screening in individuals with a lifetime risk of breast cancer >20%, to include semiannual CBE, along with annual mammogram and annual breast MRI, which would alternate.  Risks and benefits of increased screening with breast MRI were  discussed.    Discussed with patient option of speaking with Medical Oncology regarding taking a pharmacologic agent such as tamoxifen or an AI to reduce breast cancer risk.      Discussed with patient option to speak with a breast surgeon regarding risk-reducing mastectomy, though this is generally only considered in women with a genetic mutation conferring a high risk for breast cancer or with a compelling family history.    Discussed with patient option for genetic counseling through [facility].    High-Risk Plan    After a thorough discussion, patient elects to proceed with initiating paperwork for genetic counseling through [facility].      Patient declines increased breast cancer screenings to include semiannual CBE, along with annual mammogram and annual breast MRI, which would alternate, an ambulatory referral to Medical Oncology to discuss chemoprevention, and an ambulatory referral to a breast surgeon to discuss prophylactic mastectomy and was advised to contact clinic with any changes in her decision.    Other    RTC in 1 month if imaging normal today.    Pt to obtain her records from Dr. Seema Aguilar and have them sent here.    Pt to obtain her cousin's genetic testing results and notify me with results once obtained.    Encouraged breast awareness, including monthly breast self-exams.  Advised patient to RTC with any interval changes or concerns.  Questions were encouraged and answered to patient's satisfaction, and patient verbalized understanding of information and agreement with the plan.

## 2019-01-04 NOTE — PROGRESS NOTES
Erika, please call pt to schedule office visit with me in about 1 month for repeat breast exam.    Thanks,  Juan Miguel

## 2019-01-10 ENCOUNTER — PATIENT MESSAGE (OUTPATIENT)
Dept: SURGERY | Facility: CLINIC | Age: 64
End: 2019-01-10

## 2019-01-23 ENCOUNTER — PATIENT MESSAGE (OUTPATIENT)
Dept: SURGERY | Facility: CLINIC | Age: 64
End: 2019-01-23

## 2019-01-23 ENCOUNTER — PATIENT MESSAGE (OUTPATIENT)
Dept: SLEEP MEDICINE | Facility: CLINIC | Age: 64
End: 2019-01-23

## 2019-01-23 ENCOUNTER — TELEPHONE (OUTPATIENT)
Dept: SURGERY | Facility: CLINIC | Age: 64
End: 2019-01-23

## 2019-01-23 NOTE — TELEPHONE ENCOUNTER
"Called pt as she requested.  She did not have questions pertaining to [facility]; rather, she wanted to provide an update.      Her maternal 1st cousin (Antoinette) with breast cancer had "negative genetic testing."  Her cousin Frannie has not been tested.  Pt emailed [facility] with this updated information.    Spoke w/ pt's [facility] genetic counselor Lois Rudd on phone after advising pt I would do so.  Lois stated that she received pt's email with updates.  She will reach out to pt for follow-up counseling and to see if pt should possibly proceed with testing.  Lois will fax me any updated recommendations.  "

## 2019-01-24 ENCOUNTER — OFFICE VISIT (OUTPATIENT)
Dept: SLEEP MEDICINE | Facility: CLINIC | Age: 64
End: 2019-01-24
Payer: MEDICARE

## 2019-01-24 VITALS
HEART RATE: 61 BPM | SYSTOLIC BLOOD PRESSURE: 160 MMHG | WEIGHT: 293 LBS | BODY MASS INDEX: 59.14 KG/M2 | DIASTOLIC BLOOD PRESSURE: 77 MMHG

## 2019-01-24 DIAGNOSIS — I10 ESSENTIAL HYPERTENSION: ICD-10-CM

## 2019-01-24 DIAGNOSIS — E66.01 MORBID OBESITY WITH BODY MASS INDEX (BMI) OF 60.0 TO 69.9 IN ADULT: ICD-10-CM

## 2019-01-24 DIAGNOSIS — G47.33 OBSTRUCTIVE SLEEP APNEA: Primary | ICD-10-CM

## 2019-01-24 PROCEDURE — 99999 PR PBB SHADOW E&M-EST. PATIENT-LVL IV: CPT | Mod: PBBFAC,,, | Performed by: NURSE PRACTITIONER

## 2019-01-24 PROCEDURE — 99214 OFFICE O/P EST MOD 30 MIN: CPT | Mod: PBBFAC,PO | Performed by: NURSE PRACTITIONER

## 2019-01-24 PROCEDURE — 99214 PR OFFICE/OUTPT VISIT, EST, LEVL IV, 30-39 MIN: ICD-10-PCS | Mod: S$PBB,,, | Performed by: NURSE PRACTITIONER

## 2019-01-24 PROCEDURE — 99214 OFFICE O/P EST MOD 30 MIN: CPT | Mod: S$PBB,,, | Performed by: NURSE PRACTITIONER

## 2019-01-24 PROCEDURE — 99999 PR PBB SHADOW E&M-EST. PATIENT-LVL IV: ICD-10-PCS | Mod: PBBFAC,,, | Performed by: NURSE PRACTITIONER

## 2019-01-24 NOTE — PROGRESS NOTES
This 63 y.o. female patient returns for the management of obstructive sleep apnea    She continues to use apap 14-20cm nightly. Continued improvement of snoring and headaches. Still disrupted sleep-->nocturia q2h attributes to pain. Takes 50% of the time norco qhs, tries to take ASA instead. Prn soma use (back/knee/wrist pains). Getting regular supplies. LOST 26#. Hasn't been swimming in over year due to recurrent ear infection. Continued dry ears and teeth clenching. Using 90% tile 17cm with WISP nasal mask. No chin strap. Denies bloating/belching. Hard to exhale though against pressure at times. Current chest cold symptoms.   Interrogation- 30d avg 8:42h/n.n AHI 0.2, 90% tile 16-17cm. 0-3% leak. 30/30d>4h. 0% periodic  BP suboptimal today  SIDE sleeper  ESS=2      PRIOR SLEEP HISTORY:   reports that she has intermittent breathing interruption in her sleep  Snoring.  Wakes up with dry mouth - which gives headaches  Bathroom every 2 hours.  Groggy in morning  Morning headaches after mouth breathing  ESS = 3  Denies limb kicking or RLS.  Left arm pain - contributes to sleep disturbances  Soma and Vicodin for back pain; knee pain    1/2018:   The patient stated her previous DME does not accept her insurance. She is requesting for an order and her insurance to be sent to the DME provided below. She already confirmed that they accept her insurance. ESS=3    [facility]   [phone]    Patient using CPAP nightly. She reports sleeping longer with CPAP  Snoring cessation  Some teeth grinding, some lingual protrusion at night, which is causing lower teeth shift per her dentist  Dry mouth not occuring with CPAP  Using a Wisp mask.    CPAP interrogation: Auto CPAP set at 14-20 cm; 90% tile is 15.6; AHI <1; leak 1%; 7:36 hours/night 30-day; 30/30 >4 hours;     Home Sleep Study: 12/11/14: +LIZETH, AHI 15, %time <90% = 9.9%        REVIEW OF SYSTEMS:  Sleep related symptoms as per HPI. Otherwise, a  balance of systems reviewed is negative.    PHYSICAL EXAM:  BP (!) 160/77   Pulse 61   Wt (!) 166.2 kg (366 lb 6.4 oz)   BMI 59.14 kg/m²   GENERAL: Well groomed; Normally developed; morbid obese    ASSESSMENT:    1. Obstructive Sleep Apnea, moderate . Symptoms remain improved, benefiting from therapy. AHI<5. Excellent adherence  Medical comorbidiies- morbid obesity, HTN    PLAN:  1. Continue autoPAP 14 - 20 cm, CONSIDER BIPAP titration study given some pressure intolerance, reduce pressure requirements nasally (?help ear drying/clenching so tightly). Iza PORTILLO prn supplies  2.Discussed effectiveness of therapy and potential ramifications of untreated LIZETH, including heart disease, hypertension, cognitive difficulties, stroke, and diabetes.    3. Encouraged continued weight loss efforts for potential improvement of LIZETH and overall health benefits (earplugs + swimmer cap to avoid water in ears possibly)  4. RTC annually otherwise sooner if needed. See PCP re: HTN mgt

## 2019-01-31 ENCOUNTER — OFFICE VISIT (OUTPATIENT)
Dept: ORTHOPEDICS | Facility: CLINIC | Age: 64
End: 2019-01-31
Payer: MEDICARE

## 2019-01-31 VITALS
HEIGHT: 66 IN | HEART RATE: 59 BPM | WEIGHT: 293 LBS | BODY MASS INDEX: 47.09 KG/M2 | SYSTOLIC BLOOD PRESSURE: 138 MMHG | DIASTOLIC BLOOD PRESSURE: 88 MMHG

## 2019-01-31 DIAGNOSIS — M77.8 RIGHT WRIST TENDONITIS: Primary | ICD-10-CM

## 2019-01-31 PROCEDURE — 99999 PR PBB SHADOW E&M-EST. PATIENT-LVL IV: CPT | Mod: PBBFAC,,, | Performed by: ORTHOPAEDIC SURGERY

## 2019-01-31 PROCEDURE — 99213 PR OFFICE/OUTPT VISIT, EST, LEVL III, 20-29 MIN: ICD-10-PCS | Mod: S$PBB,,, | Performed by: ORTHOPAEDIC SURGERY

## 2019-01-31 PROCEDURE — 99213 OFFICE O/P EST LOW 20 MIN: CPT | Mod: S$PBB,,, | Performed by: ORTHOPAEDIC SURGERY

## 2019-01-31 PROCEDURE — 99999 PR PBB SHADOW E&M-EST. PATIENT-LVL IV: ICD-10-PCS | Mod: PBBFAC,,, | Performed by: ORTHOPAEDIC SURGERY

## 2019-01-31 PROCEDURE — 99214 OFFICE O/P EST MOD 30 MIN: CPT | Mod: PBBFAC | Performed by: ORTHOPAEDIC SURGERY

## 2019-01-31 RX ORDER — DOXYCYCLINE 100 MG/1
CAPSULE ORAL
Refills: 0 | COMMUNITY
Start: 2019-01-18 | End: 2019-03-26

## 2019-01-31 NOTE — PROGRESS NOTES
"Subjective:     HPI 11/5/18:   Joy Singer is a 63 y.o. female self-referred for evaluation and treatment of right wrist pain. This is evaluated as a pain from using a cane for knee. injury. The pain began a few months ago. The pain is located primarily in the palmar area, and ulnar side. When pt swims laps fingers go numb.  She describes the symptoms as numbing and long finger feels like"bad arthritis" , really stiff. Symptoms improve with rest. The symptoms are worse with movement. The patient  does not have neck pain. The patient is active in swimming. Treatment to date has been brace, with significant relief. Pain radiated from pinky to elbow. No numbnes. Pt also c/o long finger pain at MCP right hand.     Interval History 1/31/19:   Patient here for follow-up of right wrist pain. Patient did OT for about 6 weeks which helped a good amount. Stopped OT about 1 month ago and pain worsened, but still better than before. Still gets about 5-6 episodes of volar wrist pain proximal to wrist crease. Describes pain as burning. Pain worse with lifting and pronation.        Objective:   ROS:  Constitution: Negative for chills and fever.   Cardiovascular: Negative for chest pain.   Respiratory: Negative for cough and shortness of breath.    Neurological: Negative for numbness or tingling of extremiteis  Psychiatric/Behavioral: Negative for altered mental status. The patient is not nervous/anxious.          General :    alert, appears stated age and cooperative   Gait:  Antalgic. The patient can bear weight on the injured extremity.   Bilateral Upper Extremity  Swelling:  none noted   Bruising:   none noted   Tenderness:   none   Wrist ROM:   palmar flexion 90/90, dorsiflexion 90/90, pronation 90/90, supination 90/90   Atrophy:  absent   Strength:   normal/normal   Sensation:  normal   Two-Point Discr.:      Phalen's:   negative/negative   Tinel's   negative/negative     Imaging  Prior films reviewed: no acute fracture " or osseous lesions  Assessment:      right wrist  tendonitis     Plan:     Discussed treatment options with patient including conservative management, OT, and MRI  Patient states she would like to hold off of MRI and formal OT now as her  is about to have a hip replacement.  Patient will restart a home exercise program  Follow-up as needed. Will consider MRI if patient's symptoms continue

## 2019-02-04 ENCOUNTER — OFFICE VISIT (OUTPATIENT)
Dept: SURGERY | Facility: CLINIC | Age: 64
End: 2019-02-04
Payer: MEDICARE

## 2019-02-04 VITALS
SYSTOLIC BLOOD PRESSURE: 150 MMHG | WEIGHT: 293 LBS | BODY MASS INDEX: 47.09 KG/M2 | TEMPERATURE: 99 F | HEIGHT: 66 IN | DIASTOLIC BLOOD PRESSURE: 76 MMHG | HEART RATE: 52 BPM

## 2019-02-04 DIAGNOSIS — Z12.39 BREAST CANCER SCREENING: Primary | ICD-10-CM

## 2019-02-04 DIAGNOSIS — Z80.3 FAMILY HISTORY OF BREAST CANCER: ICD-10-CM

## 2019-02-04 PROCEDURE — 99999 PR PBB SHADOW E&M-EST. PATIENT-LVL III: ICD-10-PCS | Mod: PBBFAC,,, | Performed by: NURSE PRACTITIONER

## 2019-02-04 PROCEDURE — 99999 PR PBB SHADOW E&M-EST. PATIENT-LVL III: CPT | Mod: PBBFAC,,, | Performed by: NURSE PRACTITIONER

## 2019-02-04 PROCEDURE — 99213 OFFICE O/P EST LOW 20 MIN: CPT | Mod: PBBFAC,PO | Performed by: NURSE PRACTITIONER

## 2019-02-04 PROCEDURE — 99213 PR OFFICE/OUTPT VISIT, EST, LEVL III, 20-29 MIN: ICD-10-PCS | Mod: S$PBB,,, | Performed by: NURSE PRACTITIONER

## 2019-02-04 PROCEDURE — 99213 OFFICE O/P EST LOW 20 MIN: CPT | Mod: S$PBB,,, | Performed by: NURSE PRACTITIONER

## 2019-02-04 NOTE — PROGRESS NOTES
"Patient ID: Joy Singer is a 63 y.o. female.    Chief Complaint: Breast Cancer Screening (CBE) and Breast Pain (Right Breast)        HPI:  Established patient of the Department of Breast Surgery presents today for follow-up, last seen by me on 1/3/2019.    Breast History:    At 1/3/2019 visit w/ me:   Patient reports that abovementioned right breast pain is on "entire right side of right breast" and has not changed in any way, no worse, no better.  Sometimes it goes away for days, some days it's worse than others.  She states, "I'm not here today for that, just for my annual screening."  Onset about 7 months ago.  Presently -3/10.  No associated mass or skin change.  Diclofenac cream and ice help to relieve pain.    Hx of [facility]     Initially presented to Dr. Wall 2016 with history of abnormal mammogram and breast US.      Personal history of breast cancer:  No     Personal history of breast biopsy:  Hx of mammotome biopsies with Dr. Seema Aguilar, several among both breasts, all benign per patient, pt states last was well over 2 years ago, pt has since transferred her breast-related care to Dr. Wall, pt does not recall ever being told she has LCIS or atypia/hyperplasia of the breast(s)     Personal history of breast surgery:  no     Interval Breast History    Patient denies palpable breast mass, breast pain, breast-related skin changes, nipple discharge and nipple retraction.    Breast Imaging  bilat dia mmg and bilat ltd breast US 1/3/2019, report reviewed:  BI-RADS 1    GYN History:  Age of menarche:  13 y/o  Uterus and ovaries:  S/p BSO at 48 y/o  Menopause:  Surgical at 48 y/o  HRT usage:  never  , first live birth at 28 y/o  Personal history of ovarian cancer:  no     Other Oncologic History:  Personal history of other cancer not abovementioned:  no  Personal history of genetic testing:  no  Personal history of thoracic radiation between 11 and 30 y/o:  no     Social History:  Tobacco use:  " denies  Alcohol use:  Very seldom  Swims, hasn't exercised in a while, plans to resume     Family History:     Family Oncologic History     Ashkenazi [employer] ancestry:  no           Family History   Problem Relation Age of Onset    Lung cancer Mother      Breast cancer Maternal Aunt           50s    Breast cancer Maternal Cousin 68         daughter of aunt dx'd in 50s    Breast cancer Maternal Cousin 64         mother unaffected    Cancer Paternal Grandmother           brain cancer    Breast cancer Maternal Aunt 68    Lung cancer Maternal Aunt      Lung cancer Maternal Uncle      Lung cancer Maternal Uncle      Lung cancer Maternal Uncle      Cancer Maternal Uncle           mesothelioma    Lung cancer Maternal Uncle           in addition to mesothelioma    Ovarian cancer Neg Hx           History of genetic testing in relatives:  Possibly 1 cousin, pt unsure of results     Patient's Breast Cancer Risk Assessment Scores:  Taking into account the above information, the patient's breast cancer risk assessment scores were calculated today as follows:  Tyrer-Cuzick lifetime risk:  19.1%  Anel model 5-year risk:  3.0%        Review of Systems - See HPI for breast ROS.  Negative for fever, chills, nausea, vomiting, or unexplained fatigue.    Objective:   Physical Exam   Pulmonary/Chest: She exhibits no mass, no edema and no deformity. Right breast exhibits no inverted nipple, no mass, no nipple discharge, no skin change and no tenderness. Left breast exhibits tenderness. Left breast exhibits no inverted nipple, no mass, no nipple discharge and no skin change. Breasts are symmetrical. There is no breast swelling.   - Left breast 12:00 region with ~4mm small round red spot on skin, pt unsure if recent onset or longstanding; no associated mass or skin change appreciated.  - Diffuse TTP in lower portion of left breast, no associated or skin change appreciated.  -Bilat breasts negative for peau d'orange, skin  thickening, edema, erythema, nipple excoriation, scaling, skin ulceration.  -Breathing non-labored.   Lymphadenopathy:        Head (right side): No submental adenopathy present.        Head (left side): No submental adenopathy present.     She has no cervical adenopathy.     She has no axillary adenopathy.        Right: No supraclavicular adenopathy present.        Left: No supraclavicular adenopathy present.     Assessment:       1. Breast cancer screening    2. Family history of breast cancer          Plan:   Clinically ELEANOR today.  Left breast 12:00 region  with ~4mm small round red spot on skin, pt unsure if recent onset or longstanding; no associated mass or skin change appreciated; not suggestive of pathology.  Pt to RTC in 3 months for reevaluation if not completely resolved or sooner if changes/worsens.    Pt states she is in communication w/ [facility] regarding genetic counseling/testing; she still desires testing and is calling [facility] back today regarding this.    Encouraged breast awareness, including monthly breast self-exams.  Advised patient to RTC with any interval changes or concerns.  Questions were encouraged and answered to patient's satisfaction, and patient verbalized understanding of information and agreement with the plan.

## 2019-02-04 NOTE — PROGRESS NOTES
I have personally taken the history and examined this patient. I agree with the resident's note as stated above. Pt will do HEP. Pt's  to undergo surgery and she needs to take care of him. She will f/u PRN if she continues to have pain

## 2019-02-06 ENCOUNTER — DOCUMENTATION ONLY (OUTPATIENT)
Dept: SURGERY | Facility: CLINIC | Age: 64
End: 2019-02-06

## 2019-02-06 DIAGNOSIS — Z80.3 FAMILY HISTORY OF BREAST CANCER: Primary | ICD-10-CM

## 2019-02-06 DIAGNOSIS — Z13.79 GENETIC TESTING: ICD-10-CM

## 2019-02-06 NOTE — PROGRESS NOTES
Received fax from Lois Rudd, genetic counselor with [facility], on 2/4/19, with recommendations from genetic counseling session on this pt previously seen by me.  Per IVON Rudd's report, pt meets national guidelines for testing of the BRCA1/BRCA2, CHEK2, and PALB2 genes, with the patient-elected test being the Common Hereditary Cancers Panel through [facility].  Clinic note will need to be included with test kit sent for testing, per IVON Rudd report.    See the full [facility] report, which will be scanned into Media, for complete report including recommendations and instructions.     TREY James has been notified and will schedule pt for appt to sign forms/consents and  test kit.

## 2019-02-07 ENCOUNTER — CLINICAL SUPPORT (OUTPATIENT)
Dept: SURGERY | Facility: CLINIC | Age: 64
End: 2019-02-07
Payer: MEDICARE

## 2019-03-15 ENCOUNTER — DOCUMENTATION ONLY (OUTPATIENT)
Dept: SURGERY | Facility: CLINIC | Age: 64
End: 2019-03-15

## 2019-03-15 ENCOUNTER — PATIENT MESSAGE (OUTPATIENT)
Dept: SURGERY | Facility: CLINIC | Age: 64
End: 2019-03-15

## 2019-03-15 NOTE — PROGRESS NOTES
Received post-test genetic counseling report from Lois Rudd, genetic counselor with [facility].      Per [facility] results report (italics represent opinion of [facility]):  In [facility] Common Hereditary Cancers Panel, VUS identified in BRCA2.    Per [facility] packet (italics represent opinion of [facility]):  [facility] Common Hereditary Cancers Panel, heterozygous VUS identified in BRCA2.  No deleterious mutations.  [facility] requested that pt contact them with any familial genetic test results so implications of those results can be discussed as knowing a gene mutation exists in family would change interpretation of genetic test results and would dramatically alter cancer risk estimates and related recommendations.  Anel model 5-yr risk 2.8%.   Hereditary polyposis syndromes - [facility] recommended that pt's reported personal hx of polyps be clarified to determine if any further evaluation is indicated.    See Media for full reports.    Pt has been messaged (see email encounter).

## 2019-03-18 ENCOUNTER — TELEPHONE (OUTPATIENT)
Dept: INTERNAL MEDICINE | Facility: CLINIC | Age: 64
End: 2019-03-18

## 2019-03-18 DIAGNOSIS — I10 ESSENTIAL HYPERTENSION: ICD-10-CM

## 2019-03-18 DIAGNOSIS — Z00.00 ANNUAL PHYSICAL EXAM: Primary | ICD-10-CM

## 2019-03-18 DIAGNOSIS — E78.00 PURE HYPERCHOLESTEROLEMIA: ICD-10-CM

## 2019-03-18 DIAGNOSIS — R73.9 ELEVATED BLOOD SUGAR: ICD-10-CM

## 2019-03-18 DIAGNOSIS — E55.9 VITAMIN D INSUFFICIENCY: ICD-10-CM

## 2019-03-18 NOTE — TELEPHONE ENCOUNTER
----- Message from Martha Byers sent at 3/18/2019  8:22 AM CDT -----  Contact: self/   Doctor appointment and lab have been scheduled.  Please link lab orders to the lab appointment.  Date of doctor appointment:  5/17  Physical or EP:  phys  Date of lab appointment:  5/10  Comments:

## 2019-03-26 ENCOUNTER — OFFICE VISIT (OUTPATIENT)
Dept: INTERNAL MEDICINE | Facility: CLINIC | Age: 64
End: 2019-03-26
Payer: MEDICARE

## 2019-03-26 VITALS
BODY MASS INDEX: 47.09 KG/M2 | SYSTOLIC BLOOD PRESSURE: 134 MMHG | TEMPERATURE: 99 F | WEIGHT: 293 LBS | HEIGHT: 66 IN | HEART RATE: 61 BPM | DIASTOLIC BLOOD PRESSURE: 82 MMHG

## 2019-03-26 DIAGNOSIS — M79.671 BILATERAL FOOT PAIN: Primary | ICD-10-CM

## 2019-03-26 DIAGNOSIS — M79.672 BILATERAL FOOT PAIN: Primary | ICD-10-CM

## 2019-03-26 DIAGNOSIS — R73.9 ELEVATED BLOOD SUGAR: ICD-10-CM

## 2019-03-26 DIAGNOSIS — D69.6 THROMBOCYTOPENIA: ICD-10-CM

## 2019-03-26 DIAGNOSIS — M79.605 PAIN IN BOTH LOWER EXTREMITIES: ICD-10-CM

## 2019-03-26 DIAGNOSIS — M79.604 PAIN IN BOTH LOWER EXTREMITIES: ICD-10-CM

## 2019-03-26 DIAGNOSIS — R52 PAIN AGGRAVATED BY WALKING: ICD-10-CM

## 2019-03-26 DIAGNOSIS — M79.89 LEG SWELLING: ICD-10-CM

## 2019-03-26 DIAGNOSIS — R60.9 EDEMA, UNSPECIFIED TYPE: ICD-10-CM

## 2019-03-26 PROCEDURE — 99213 OFFICE O/P EST LOW 20 MIN: CPT | Mod: PBBFAC,PO | Performed by: INTERNAL MEDICINE

## 2019-03-26 PROCEDURE — 99214 OFFICE O/P EST MOD 30 MIN: CPT | Mod: S$PBB,,, | Performed by: INTERNAL MEDICINE

## 2019-03-26 PROCEDURE — 99999 PR PBB SHADOW E&M-EST. PATIENT-LVL III: CPT | Mod: PBBFAC,,, | Performed by: INTERNAL MEDICINE

## 2019-03-26 PROCEDURE — 99999 PR PBB SHADOW E&M-EST. PATIENT-LVL III: ICD-10-PCS | Mod: PBBFAC,,, | Performed by: INTERNAL MEDICINE

## 2019-03-26 PROCEDURE — 99214 PR OFFICE/OUTPT VISIT, EST, LEVL IV, 30-39 MIN: ICD-10-PCS | Mod: S$PBB,,, | Performed by: INTERNAL MEDICINE

## 2019-03-26 RX ORDER — CLINDAMYCIN HYDROCHLORIDE 300 MG/1
CAPSULE ORAL
COMMUNITY
End: 2019-03-26

## 2019-03-26 RX ORDER — DICYCLOMINE HYDROCHLORIDE 10 MG/1
CAPSULE ORAL
COMMUNITY
End: 2019-03-26

## 2019-03-26 RX ORDER — MELOXICAM 7.5 MG/1
TABLET ORAL
COMMUNITY
End: 2019-03-26

## 2019-03-26 RX ORDER — SPIRONOLACTONE 50 MG/1
TABLET, FILM COATED ORAL
COMMUNITY
End: 2019-03-26

## 2019-03-26 RX ORDER — TERBINAFINE HYDROCHLORIDE 250 MG/1
TABLET ORAL
COMMUNITY
End: 2019-03-26

## 2019-03-26 NOTE — PROGRESS NOTES
Subjective:       Patient ID: Joy Singer is a 63 y.o. female who presents for Leg Swelling; Foot Swelling; Knee Pain; and Hypertension      Edema   This is a new problem. The problem occurs constantly. The problem has been gradually worsening. Associated symptoms include arthralgias (foot pain, right leg pain, right shoulder pain). Pertinent negatives include no abdominal pain, chest pain, chills, congestion, coughing, diaphoresis, fever, headaches, joint swelling, nausea, numbness, rash, sore throat, urinary symptoms or vomiting. Nothing aggravates the symptoms. She has tried rest for the symptoms.   Knee Pain    The incident occurred more than 1 week ago. The injury mechanism was a fall. The pain is present in the right knee. The quality of the pain is described as aching. The pain is at a severity of 3/10. The pain is mild. The pain has been fluctuating since onset. Pertinent negatives include no loss of motion, muscle weakness or numbness. The symptoms are aggravated by movement. She has tried NSAIDs for the symptoms.      Body mass index is 60.53 kg/m².  Stopped swimming due to recurrent ear infections but plans to return. Unable to exercise due to knee pain. Needs right knee replacement, used Synvisc injections with improvement. Will return to Ortho.      Review of Systems   Constitutional: Negative for chills, diaphoresis and fever.   HENT: Negative for congestion, ear discharge, ear pain, postnasal drip, rhinorrhea, sinus pressure and sore throat.    Eyes: Negative for redness and itching.   Respiratory: Negative for cough, chest tightness and shortness of breath.    Cardiovascular: Negative for chest pain and palpitations.   Gastrointestinal: Negative for abdominal pain, constipation, diarrhea, nausea and vomiting.   Genitourinary: Negative for dysuria, frequency, hematuria and urgency.   Musculoskeletal: Positive for arthralgias (foot pain, right leg pain, right shoulder pain). Negative for joint  swelling.        Reddened left 4th toe, + tenderness, leg pain with walking   Skin: Positive for color change. Negative for rash.   Neurological: Negative for dizziness, numbness and headaches.        Left foot tingling   Hematological: Negative for adenopathy.       Objective:      Physical Exam   Constitutional: She is oriented to person, place, and time. Vital signs are normal. She appears well-developed and well-nourished. No distress.   HENT:   Head: Normocephalic and atraumatic.   Right Ear: Hearing and external ear normal.   Left Ear: Hearing and external ear normal.   Nose: Nose normal.   Mouth/Throat: Uvula is midline and mucous membranes are normal.   Eyes: Conjunctivae and lids are normal.   Cardiovascular: Normal rate, regular rhythm, normal heart sounds and intact distal pulses.   No murmur heard.  Pulmonary/Chest: Effort normal and breath sounds normal. She has no wheezes.   Abdominal: Soft. Bowel sounds are normal. She exhibits no distension. There is no tenderness.   Musculoskeletal: Normal range of motion. She exhibits no edema.   Neurological: She is alert and oriented to person, place, and time.   Skin: Skin is warm, dry and intact. No rash noted. She is not diaphoretic.   Psychiatric: She has a normal mood and affect.   Vitals reviewed.      Assessment/Plan:       1. Bilateral foot pain  - has appointment on 4/1/19 with [facility]    2. Pain in both lower extremities  - Ankle Brachial Indices W Stress W Toe Tracings (Cupid Only); Future    3. Pain aggravated by walking  - Ankle Brachial Indices W Stress W Toe Tracings (Cupid Only); Future    4. Leg swelling  - Ultrasound Lower Extremity Veins Bilateral Insuf (Cupid Only); Future    5. Edema, unspecified type   - Ultrasound Lower Extremity Veins Bilateral Insuf (Cupid Only); Future  - Brain natriuretic peptide; Future    6. Elevated blood sugar  - check labs    7. Thrombocytopenia  - repeat cbc    RTC in a few months for annual  exam    Faiza Kuhn MD

## 2019-03-27 ENCOUNTER — CLINICAL SUPPORT (OUTPATIENT)
Dept: CARDIOLOGY | Facility: CLINIC | Age: 64
End: 2019-03-27
Attending: INTERNAL MEDICINE
Payer: MEDICARE

## 2019-03-27 DIAGNOSIS — M79.89 LEG SWELLING: ICD-10-CM

## 2019-03-27 DIAGNOSIS — R60.9 EDEMA, UNSPECIFIED TYPE: ICD-10-CM

## 2019-03-27 DIAGNOSIS — M79.604 PAIN IN BOTH LOWER EXTREMITIES: ICD-10-CM

## 2019-03-27 DIAGNOSIS — M79.605 PAIN IN BOTH LOWER EXTREMITIES: ICD-10-CM

## 2019-03-27 DIAGNOSIS — R52 PAIN AGGRAVATED BY WALKING: ICD-10-CM

## 2019-03-27 LAB
IMMEDIATE ARM BP: 126 MMHG
IMMEDIATE LEFT ABI: 1.18
IMMEDIATE LEFT TIBIAL: 149 MMHG
IMMEDIATE RIGHT ABI: 1.26
IMMEDIATE RIGHT TIBIAL: 159 MMHG
LEFT ABI: 1.33
LEFT DORSALIS PEDIS: 180 MMHG
LEFT GIAC DIA: 0.3 MM
LEFT GREAT SAPHENOUS DISTAL THIGH DIA: 0.63 CM
LEFT GREAT SAPHENOUS JUNCTION DIA: 0.73 CM
LEFT GREAT SAPHENOUS KNEE DIA: 0.45 CM
LEFT GREAT SAPHENOUS MIDDLE THIGH DIA: 0.73 CM
LEFT GREAT SAPHENOUS PROXIMAL CALF DIA: 0.35 CM
LEFT POSTERIOR TIBIAL: 167 MMHG
RIGHT ABI: 1.38
RIGHT ARM BP: 135 MMHG
RIGHT DORSALIS PEDIS: 186 MMHG
RIGHT GIAC DIA: 0.32 MM
RIGHT GREAT SAPHENOUS DISTAL THIGH DIA: 0.59 CM
RIGHT GREAT SAPHENOUS JUNCTION DIA: 0.83 CM
RIGHT GREAT SAPHENOUS KNEE DIA: 0.5 CM
RIGHT GREAT SAPHENOUS MIDDLE THIGH DIA: 0.65 CM
RIGHT GREAT SAPHENOUS PROXIMAL CALF DIA: 0.35 CM
RIGHT POSTERIOR TIBIAL: 172 MMHG
TOE RAISES: 50

## 2019-03-27 PROCEDURE — 93970 EXTREMITY STUDY: CPT | Mod: PBBFAC,PO | Performed by: INTERNAL MEDICINE

## 2019-03-27 PROCEDURE — 93970 CV US LOWER VENOUS INSUFFICIENCY BILATERAL (CUPID ONLY): ICD-10-PCS | Mod: 26,S$PBB,, | Performed by: INTERNAL MEDICINE

## 2019-03-27 PROCEDURE — 93924 CV US ANKLE BRACHIAL INDICES W STRESS W TOE TRACINGS (CUPID ONLY): ICD-10-PCS | Mod: 26,S$PBB,, | Performed by: INTERNAL MEDICINE

## 2019-03-27 PROCEDURE — 93924 LWR XTR VASC STDY BILAT: CPT | Mod: PBBFAC,PO | Performed by: INTERNAL MEDICINE

## 2019-03-29 ENCOUNTER — PATIENT MESSAGE (OUTPATIENT)
Dept: SURGERY | Facility: CLINIC | Age: 64
End: 2019-03-29

## 2019-03-31 PROBLEM — M51.369 DEGENERATION OF LUMBAR INTERVERTEBRAL DISC: Status: ACTIVE | Noted: 2019-03-31

## 2019-03-31 PROBLEM — M51.36 DEGENERATION OF LUMBAR INTERVERTEBRAL DISC: Status: ACTIVE | Noted: 2019-03-31

## 2019-03-31 PROBLEM — G56.00 CARPAL TUNNEL SYNDROME: Status: ACTIVE | Noted: 2019-03-31

## 2019-03-31 PROBLEM — M76.829 TIBIALIS TENDINITIS: Status: ACTIVE | Noted: 2019-03-31

## 2019-03-31 PROBLEM — G43.009 MIGRAINE WITHOUT AURA, NOT REFRACTORY: Status: ACTIVE | Noted: 2019-03-31

## 2019-03-31 PROBLEM — R52 PAIN AGGRAVATED BY WALKING: Status: ACTIVE | Noted: 2019-03-31

## 2019-03-31 PROBLEM — E23.6 EMPTY SELLA SYNDROME: Status: ACTIVE | Noted: 2019-03-31

## 2019-03-31 PROBLEM — M53.3 DISORDER OF SACROILIAC JOINT: Status: ACTIVE | Noted: 2019-03-31

## 2019-03-31 PROBLEM — M50.30 DDD (DEGENERATIVE DISC DISEASE), CERVICAL: Status: ACTIVE | Noted: 2019-03-31

## 2019-03-31 PROBLEM — M79.89 LEG SWELLING: Status: ACTIVE | Noted: 2019-03-31

## 2019-04-10 ENCOUNTER — PES CALL (OUTPATIENT)
Dept: ADMINISTRATIVE | Facility: CLINIC | Age: 64
End: 2019-04-10

## 2019-05-04 ENCOUNTER — OFFICE VISIT (OUTPATIENT)
Dept: INTERNAL MEDICINE | Facility: CLINIC | Age: 64
End: 2019-05-04
Payer: MEDICARE

## 2019-05-04 VITALS
DIASTOLIC BLOOD PRESSURE: 71 MMHG | WEIGHT: 293 LBS | BODY MASS INDEX: 47.09 KG/M2 | OXYGEN SATURATION: 96 % | HEIGHT: 66 IN | HEART RATE: 60 BPM | SYSTOLIC BLOOD PRESSURE: 135 MMHG | TEMPERATURE: 99 F | RESPIRATION RATE: 24 BRPM

## 2019-05-04 DIAGNOSIS — R07.9 CHEST PAIN, UNSPECIFIED TYPE: ICD-10-CM

## 2019-05-04 DIAGNOSIS — R05.9 COUGH: ICD-10-CM

## 2019-05-04 DIAGNOSIS — R50.9 FEVER, UNSPECIFIED FEVER CAUSE: Primary | ICD-10-CM

## 2019-05-04 LAB
INFLUENZA A, MOLECULAR: NEGATIVE
INFLUENZA B, MOLECULAR: NEGATIVE
SPECIMEN SOURCE: NORMAL

## 2019-05-04 PROCEDURE — 93010 EKG 12-LEAD: ICD-10-PCS | Mod: S$PBB,,, | Performed by: INTERNAL MEDICINE

## 2019-05-04 PROCEDURE — 93005 ELECTROCARDIOGRAM TRACING: CPT | Mod: PBBFAC,PO | Performed by: INTERNAL MEDICINE

## 2019-05-04 PROCEDURE — 93010 ELECTROCARDIOGRAM REPORT: CPT | Mod: S$PBB,,, | Performed by: INTERNAL MEDICINE

## 2019-05-04 PROCEDURE — 99214 PR OFFICE/OUTPT VISIT, EST, LEVL IV, 30-39 MIN: ICD-10-PCS | Mod: S$PBB,,, | Performed by: INTERNAL MEDICINE

## 2019-05-04 PROCEDURE — 99999 PR PBB SHADOW E&M-EST. PATIENT-LVL III: ICD-10-PCS | Mod: PBBFAC,,, | Performed by: INTERNAL MEDICINE

## 2019-05-04 PROCEDURE — 99213 OFFICE O/P EST LOW 20 MIN: CPT | Mod: PBBFAC,PO | Performed by: INTERNAL MEDICINE

## 2019-05-04 PROCEDURE — 87502 INFLUENZA DNA AMP PROBE: CPT | Mod: PO

## 2019-05-04 PROCEDURE — 99214 OFFICE O/P EST MOD 30 MIN: CPT | Mod: S$PBB,,, | Performed by: INTERNAL MEDICINE

## 2019-05-04 PROCEDURE — 99999 PR PBB SHADOW E&M-EST. PATIENT-LVL III: CPT | Mod: PBBFAC,,, | Performed by: INTERNAL MEDICINE

## 2019-05-04 RX ORDER — AZITHROMYCIN 250 MG/1
TABLET, FILM COATED ORAL
Qty: 6 TABLET | Refills: 0 | Status: SHIPPED | OUTPATIENT
Start: 2019-05-04 | End: 2019-05-09

## 2019-05-04 RX ORDER — OSELTAMIVIR PHOSPHATE 75 MG/1
75 CAPSULE ORAL 2 TIMES DAILY
Qty: 10 CAPSULE | Refills: 0 | Status: SHIPPED | OUTPATIENT
Start: 2019-05-04 | End: 2019-05-09

## 2019-05-04 NOTE — PROGRESS NOTES
This office note has been dictated.  Dictation #1  MRN:[id number]  [facility]:[id number]

## 2019-05-05 NOTE — PROGRESS NOTES
HISTORY OF PRESENT ILLNESS:  This is a 63-year-old lady seen in the Saturday   morning [facility].  The patient has a history of morbid   obesity, obstructive sleep apnea, dyslipidemia, pseudotumor cerebri, is in today   with 24-36 hours of symptomatology period began with low-grade fever last p.m.,   temperature 100.8.  She states she felt bad with some achiness all over and   some sweating.  She has a very slight cough.  No overt shortness of breath.  No   URI symptoms, though a bit of a scratchy throat.  She is describing some   generalized chest and upper torso discomfort, it is fairly constant, does seem   to increase with movements, turning of the torso, etc.  There is no hemoptysis.    No recent travel.  No other family members with same.  She did not receive   influenza vaccine for this season.    CURRENT MEDICATIONS:  All medications are noted and reviewed in the electronic   medical record medication list.    REVIEW OF SYSTEMS:  CONSTITUTIONAL:  Body aches, low-grade temperature as noted.  CARDIOVASCULAR:  No overt cardiac type chest pain.  No palpitation, no syncope.    No presyncope.  No claudication.  GASTROINTESTINAL:  No nausea, vomiting, abdominal pain or diarrhea.  GENITOURINARY:  No dysuria, frequency, change in color or character of urine.  SKIN:  No rashes.  NEUROLOGIC:  No significant headaches or focal neurological symptomatologies.    PAST MEDICAL HISTORY, PAST SURGICAL HISTORY, FAMILY MEDICAL HISTORY AND SOCIAL   HISTORY:  All noted and reviewed in our electronic medical record history   sections.    PHYSICAL EXAMINATION:  GENERAL:  Alert female in no acute distress.  VITAL SIGNS:  All noted and reviewed as normal.  EYES:  Sclerae white, nonicteric.  HEENT:  Normocephalic.  No facial asymmetry, tenderness or redness.  Ear canals   and tympanic membranes are normal.  Mouth and pharynx normal.  There is no   pharyngeal edema, erythema or exudate.  NECK:  Supple, no masses.  No  cervical adenopathy.  LUNGS:  Clear to auscultation.  CARDIOVASCULAR:  Regular rate and rhythm, no significant murmur.  MENTAL STATUS:  Alert and oriented.  Affect and mood are all appropriate.    DATA:  Rapid influenza testing in the office is negative.    Room air O2 saturation 96%.    ECG normal.    IMPRESSION:  Appears to have probable respiratory illness with low-grade fever,   slight cough, possible early influenza versus other.    PLAN:  1.  Empiric Tamiflu 75 mg b.i.d. 5 days.  2.  Z-ELIU.  Other symptomatic treatment.  3.  She is advised to hold her pravastatin for 10 days while taking the Z-ELIU.  4.  Advised without resolution or any worsening symptoms or other.      PB/CAMILO  dd: 05/04/2019 12:46:55 (CDT)  td: 05/05/2019 12:29:51 (CDT)  Doc ID   #[id number]  Job ID #[id number]    CC:

## 2019-05-06 ENCOUNTER — TELEPHONE (OUTPATIENT)
Dept: INTERNAL MEDICINE | Facility: CLINIC | Age: 64
End: 2019-05-06

## 2019-05-06 ENCOUNTER — TELEPHONE (OUTPATIENT)
Dept: SURGERY | Facility: CLINIC | Age: 64
End: 2019-05-06

## 2019-05-06 DIAGNOSIS — R07.9 CHEST PAIN, UNSPECIFIED TYPE: Primary | ICD-10-CM

## 2019-05-06 NOTE — TELEPHONE ENCOUNTER
Returned call to  regarding message left needed to reschedule apt apt has the Flu. Rescheduled apt for 6/04/19 @ 2:15 pm for Breast Exam.  Informed patient of new apt  Date & time mailed out apt slip.

## 2019-05-06 NOTE — TELEPHONE ENCOUNTER
----- Message from Elba Vigil sent at 5/6/2019  7:50 AM CDT -----  Regarding: EKG ORDER  Please place and link EKG order to the EKG or Doctor appointment scheduled on 05/04/19  thanks. Elba [id number]

## 2019-06-13 ENCOUNTER — PATIENT MESSAGE (OUTPATIENT)
Dept: SURGERY | Facility: CLINIC | Age: 64
End: 2019-06-13

## 2019-06-13 ENCOUNTER — OFFICE VISIT (OUTPATIENT)
Dept: SURGERY | Facility: CLINIC | Age: 64
End: 2019-06-13
Payer: MEDICARE

## 2019-06-13 VITALS
HEIGHT: 66 IN | WEIGHT: 293 LBS | DIASTOLIC BLOOD PRESSURE: 101 MMHG | TEMPERATURE: 99 F | BODY MASS INDEX: 47.09 KG/M2 | SYSTOLIC BLOOD PRESSURE: 144 MMHG | HEART RATE: 69 BPM

## 2019-06-13 DIAGNOSIS — Z80.3 FAMILY HISTORY OF BREAST CANCER: ICD-10-CM

## 2019-06-13 DIAGNOSIS — Z12.39 BREAST CANCER SCREENING, HIGH RISK PATIENT: Primary | ICD-10-CM

## 2019-06-13 PROCEDURE — 99213 PR OFFICE/OUTPT VISIT, EST, LEVL III, 20-29 MIN: ICD-10-PCS | Mod: S$PBB,,, | Performed by: NURSE PRACTITIONER

## 2019-06-13 PROCEDURE — 99213 OFFICE O/P EST LOW 20 MIN: CPT | Mod: PBBFAC,PO | Performed by: NURSE PRACTITIONER

## 2019-06-13 PROCEDURE — 99213 OFFICE O/P EST LOW 20 MIN: CPT | Mod: S$PBB,,, | Performed by: NURSE PRACTITIONER

## 2019-06-13 PROCEDURE — 99999 PR PBB SHADOW E&M-EST. PATIENT-LVL III: CPT | Mod: PBBFAC,,, | Performed by: NURSE PRACTITIONER

## 2019-06-13 PROCEDURE — 99999 PR PBB SHADOW E&M-EST. PATIENT-LVL III: ICD-10-PCS | Mod: PBBFAC,,, | Performed by: NURSE PRACTITIONER

## 2019-06-13 NOTE — PROGRESS NOTES
"Patient ID: Joy Singer is a 63 y.o. female.    Chief Complaint: Follow-up (3 mth Follow up)        HPI:  Established patient presents today for follow-up.    Breast History:    Hx of FCC     Personal history of breast cancer:  No     Personal history of breast biopsy:  Hx of mammotome biopsies with Dr. Seema Aguilar, several among both breasts, all benign per patient, pt states last was well over 2 years ago, pt has since transferred her breast-related care to Dr. Wall, pt does not recall ever being told she has LCIS or atypia/hyperplasia of the breast(s)     Personal history of breast surgery:  no    [facility] Common Hereditary Cancers Panel, heterozygous VUS identified in BRCA2, in 2019     At 1/3/2019 visit w/ me:   Patient reports that abovementioned right breast pain is on "entire right side of right breast" and has not changed in any way, no worse, no better.  Sometimes it goes away for days, some days it's worse than others.  She states, "I'm not here today for that, just for my annual screening."  Onset about 7 months ago.  Presently -3/10.  No associated mass or skin change.  Diclofenac cream and ice help to relieve pain.    Breast Imaging  [facility] breast US 1/3/2019, report reviewed:  scattered areas of fibroglandular density, BI-RADS 1, TC lifetime risk 14.56%     Interval Breast History     Patient reports R breast pain (lateral), onset about 1 yr ago.  Comes and goes.  Improving.  Infrequent.    Patient reports 1 occurrence of R upper breast pain, only happened once in last couple weeks.    Patient denies palpable breast mass, breast-related skin changes, nipple discharge and nipple retraction.  No other breast-related concerns.     GYN History:  Age of menarche:  11 y/o  , first live birth at 28 y/o  Uterus and ovaries:  S/p BSO at 48 y/o  Menopause:  Surgical at 48 y/o  HRT usage:  never    Other Oncologic History:  Personal history of other cancer not " abovementioned:  no  Personal history of genetic testing:  [facility] Common Hereditary Cancers Panel, heterozygous VUS identified in BRCA2, in 2019     Social History:  Tobacco use:  denies  Alcohol use:  Very seldom  Swims, hasn't exercised in a while, plans to resume     Family History:     Family Oncologic History     Ashkenazi Hindu ancestry:  no  History of genetic testing in relatives:  Pt     Family History   Problem Relation Age of Onset    Breast cancer Maternal Aunt         50s    Breast cancer Maternal Cousin 68        daughter of aunt dx'd in 50s    Breast cancer Maternal Cousin 64        mat 1st cousin, mother unaffected, myRisk with [employer] 2017 was negative ([place])    Cancer Paternal Grandmother         brain cancer    Breast cancer Maternal Aunt 68    Breast cancer Maternal Cousin         1st cousin, daughter of unaffected aunt    Breast cancer Maternal Cousin         mother's brother's daughter    Ovarian cancer Neg Hx          Patient's Breast Cancer Risk Assessment Scores:  the patient's breast cancer risk assessment scores were calculated today as follows:  Bozena lifetime risk:  21.7%      Review of Systems - See HPI.  Objective:   Physical Exam   Pulmonary/Chest: Effort normal. No respiratory distress. She exhibits no mass, no edema and no deformity. Right breast exhibits tenderness. Right breast exhibits no inverted nipple, no mass, no nipple discharge and no skin change. Left breast exhibits tenderness. Left breast exhibits no inverted nipple, no mass, no nipple discharge and no skin change. No breast swelling. Breasts are symmetrical.   Bilat breasts with diffuse TTP.  No palpable abnormality or concerning skin change appreciated.   Genitourinary: No breast swelling.   Lymphadenopathy:     She has no cervical adenopathy.     She has no axillary adenopathy.        Right: No supraclavicular adenopathy present.        Left: No supraclavicular adenopathy present.     Assessment:        1. Breast cancer screening, high risk patient    2. Family history of breast cancer          Plan:     Clinically ELEAN[facility] today.    Patient reports R breast pain (lateral), onset about 1 yr ago.  Comes and goes.  Improving.  Infrequent.  Not suggestive of pathology given aforementioned characteristics.    Patient reports 1 occurrence of R upper breast pain, only happened once in last couple weeks.  Not suggestive of pathology given only occurred once.  Pt to notify me if recurs.    My office is to fax pt-signed JAX form to Dr. Wong's office to obtain copy of pt's colonoscopy reports per [facility] request.  My office will fax these to [facility] once received.    Will also fax pt's cousin's genetic test results to [facility] per pt request.  They were first de-identified.    RTC in 6 mos for next CBE.  Next bilat mmg due around 1/4/20.    Counseled patient regarding her being at increased risk for breast cancer based on risk factors which patient and I discussed today and which were factored into the Tyrer-Cuzick risk assessment model v8.0b, which estimated a lifetime risk of breast cancer of 21.7% for this patient as of today.  Discussed with patient that there are several models available for stratifying breast cancer risk, and Tyrer-Cuzick is presently the model utilized by [facility] and is a model recommended per current NCCN guidelines.    Discussed with patient current NCCN guideline recommendations for increased breast cancer screening in individuals with a lifetime risk of breast cancer >20%, to include semiannual CBE, along with annual mammogram and annual breast MRI, which would alternate.  Risks and benefits of increased screening with breast MRI were discussed.  Pt stated she was turned away from breast MRI at/near her current weight.  Discussed that bilat complete breast US can be done in lieu of breast MRI in pt's case but that it is not as reliable.  Discussed with patient option of speaking  with Medical Oncology regarding taking a pharmacologic agent such as tamoxifen or an AI to reduce breast cancer risk.     After a thorough discussion, patient elects to proceed with alternating annual mammogram and annual complete breast US (bilat) along with semiannual CBEs.  Order placed for bilateral complete breast US, to occur within the next month.  Advised patient to contact the pricing transparency line (phone number provided), prior to undergoing breast US, to determine her out-of-pocket cost.      Patient declines an appointment in Medical Oncology to discuss the possibility of chemoprevention at this time and was advised to contact clinic with any changes in her decision.    Offered pt referral to Nutrition secondary to her increased risk of breast cancer along with her elevated BMI.  Pt desires to proceed with this.  Referral could not be placed, though attempted, secondary to Medicare's lack of coverage.  Did provide pt with number to call to schedule Nutrition appt.  Messaged pt regarding insurance issue (see email encounter).      Encouraged breast awareness, including monthly breast self-exams.  Advised patient to RTC with any interval changes or concerns.  Questions were encouraged and answered to patient's satisfaction, and patient verbalized understanding of information and agreement with the plan.

## 2019-06-21 ENCOUNTER — DOCUMENTATION ONLY (OUTPATIENT)
Dept: SURGERY | Facility: CLINIC | Age: 64
End: 2019-06-21

## 2019-06-21 ENCOUNTER — PATIENT MESSAGE (OUTPATIENT)
Dept: SURGERY | Facility: CLINIC | Age: 64
End: 2019-06-21

## 2019-06-21 ENCOUNTER — HOSPITAL ENCOUNTER (OUTPATIENT)
Dept: RADIOLOGY | Facility: HOSPITAL | Age: 64
Discharge: HOME | End: 2019-06-21
Attending: NURSE PRACTITIONER
Payer: MEDICARE

## 2019-06-21 VITALS — HEIGHT: 66 IN | BODY MASS INDEX: 47.09 KG/M2 | WEIGHT: 293 LBS

## 2019-06-21 DIAGNOSIS — Z12.39 BREAST CANCER SCREENING, HIGH RISK PATIENT: ICD-10-CM

## 2019-06-21 PROCEDURE — 76641 US BREAST BILATERAL COMPLETE: ICD-10-PCS | Mod: 26,50,, | Performed by: RADIOLOGY

## 2019-06-21 PROCEDURE — 76641 ULTRASOUND BREAST COMPLETE: CPT | Mod: TC,50,PO

## 2019-06-21 PROCEDURE — 76641 ULTRASOUND BREAST COMPLETE: CPT | Mod: 26,50,, | Performed by: RADIOLOGY

## 2019-06-21 NOTE — PROGRESS NOTES
Nohemi King from [facility] called me and advised me that [facility] received pt's cousin's genetic test results that I faxed them on behalf of pt, and these were reviewed by the genetic counselor and there are no further recommendations related to genetics for this pt at this time.  Messaged pt.

## 2019-06-21 NOTE — PROGRESS NOTES
Tegan,  -please make sure this pt's release-of-info form has been faxed to Dr. Wong's office to obtain copy of pt's colonoscopy reports  -pt to RTC in 6 mos for next CBE  -pt's next bilat mmg due around 1/4/20.

## 2019-07-10 ENCOUNTER — TELEPHONE (OUTPATIENT)
Dept: INTERNAL MEDICINE | Facility: CLINIC | Age: 64
End: 2019-07-10

## 2019-07-10 DIAGNOSIS — Z00.00 ANNUAL PHYSICAL EXAM: Primary | ICD-10-CM

## 2019-07-10 DIAGNOSIS — I10 ESSENTIAL HYPERTENSION: ICD-10-CM

## 2019-07-10 DIAGNOSIS — E78.49 OTHER HYPERLIPIDEMIA: ICD-10-CM

## 2019-07-10 DIAGNOSIS — D69.6 THROMBOCYTOPENIA: ICD-10-CM

## 2019-07-10 DIAGNOSIS — R73.9 ELEVATED BLOOD SUGAR: ICD-10-CM

## 2019-07-10 DIAGNOSIS — E55.9 VITAMIN D INSUFFICIENCY: ICD-10-CM

## 2019-07-10 NOTE — TELEPHONE ENCOUNTER
----- Message from Mira Fam sent at 7/10/2019 11:27 AM CDT -----  Contact: Pt self Mobile/Home  [phone]  Doctor appointment and lab have been scheduled.  Please link lab orders to the lab appointment.  Date of doctor appointment:  08/27/2019  Date of lab appointment:  08/20/2019  Physical or EP: Physical

## 2019-07-12 DIAGNOSIS — Z12.11 COLON CANCER SCREENING: ICD-10-CM

## 2019-08-20 ENCOUNTER — LAB VISIT (OUTPATIENT)
Dept: LAB | Facility: HOSPITAL | Age: 64
End: 2019-08-20
Attending: INTERNAL MEDICINE
Payer: MEDICARE

## 2019-08-20 DIAGNOSIS — E78.49 OTHER HYPERLIPIDEMIA: ICD-10-CM

## 2019-08-20 DIAGNOSIS — I10 ESSENTIAL HYPERTENSION: ICD-10-CM

## 2019-08-20 DIAGNOSIS — R73.9 ELEVATED BLOOD SUGAR: ICD-10-CM

## 2019-08-20 DIAGNOSIS — Z00.00 ANNUAL PHYSICAL EXAM: ICD-10-CM

## 2019-08-20 DIAGNOSIS — E55.9 VITAMIN D INSUFFICIENCY: ICD-10-CM

## 2019-08-20 DIAGNOSIS — D69.6 THROMBOCYTOPENIA: ICD-10-CM

## 2019-08-20 LAB
25(OH)D3+25(OH)D2 SERPL-MCNC: 30 NG/ML (ref 30–96)
ALBUMIN SERPL BCP-MCNC: 3.5 G/DL (ref 3.5–5.2)
ALP SERPL-CCNC: 68 U/L (ref 55–135)
ALT SERPL W/O P-5'-P-CCNC: 14 U/L (ref 10–44)
ANION GAP SERPL CALC-SCNC: 11 MMOL/L (ref 8–16)
AST SERPL-CCNC: 18 U/L (ref 10–40)
BASOPHILS # BLD AUTO: 0.05 K/UL (ref 0–0.2)
BASOPHILS NFR BLD: 0.6 % (ref 0–1.9)
BILIRUB SERPL-MCNC: 0.3 MG/DL (ref 0.1–1)
BUN SERPL-MCNC: 16 MG/DL (ref 8–23)
CALCIUM SERPL-MCNC: 9.9 MG/DL (ref 8.7–10.5)
CHLORIDE SERPL-SCNC: 112 MMOL/L (ref 95–110)
CHOLEST SERPL-MCNC: 181 MG/DL (ref 120–199)
CHOLEST/HDLC SERPL: 3.8 {RATIO} (ref 2–5)
CO2 SERPL-SCNC: 20 MMOL/L (ref 23–29)
CREAT SERPL-MCNC: 0.9 MG/DL (ref 0.5–1.4)
DIFFERENTIAL METHOD: ABNORMAL
EOSINOPHIL # BLD AUTO: 0.3 K/UL (ref 0–0.5)
EOSINOPHIL NFR BLD: 3.4 % (ref 0–8)
ERYTHROCYTE [DISTWIDTH] IN BLOOD BY AUTOMATED COUNT: 15.8 % (ref 11.5–14.5)
EST. GFR  (AFRICAN AMERICAN): >60 ML/MIN/1.73 M^2
EST. GFR  (NON AFRICAN AMERICAN): >60 ML/MIN/1.73 M^2
ESTIMATED AVG GLUCOSE: 108 MG/DL (ref 68–131)
GLUCOSE SERPL-MCNC: 112 MG/DL (ref 70–110)
HBA1C MFR BLD HPLC: 5.4 % (ref 4–5.6)
HCT VFR BLD AUTO: 44.9 % (ref 37–48.5)
HDLC SERPL-MCNC: 48 MG/DL (ref 40–75)
HDLC SERPL: 26.5 % (ref 20–50)
HGB BLD-MCNC: 13.5 G/DL (ref 12–16)
IMM GRANULOCYTES # BLD AUTO: 0.04 K/UL (ref 0–0.04)
IMM GRANULOCYTES NFR BLD AUTO: 0.5 % (ref 0–0.5)
LDLC SERPL CALC-MCNC: 107 MG/DL (ref 63–159)
LYMPHOCYTES # BLD AUTO: 2.3 K/UL (ref 1–4.8)
LYMPHOCYTES NFR BLD: 28.9 % (ref 18–48)
MCH RBC QN AUTO: 25.1 PG (ref 27–31)
MCHC RBC AUTO-ENTMCNC: 30.1 G/DL (ref 32–36)
MCV RBC AUTO: 84 FL (ref 82–98)
MONOCYTES # BLD AUTO: 0.6 K/UL (ref 0.3–1)
MONOCYTES NFR BLD: 7.3 % (ref 4–15)
NEUTROPHILS # BLD AUTO: 4.7 K/UL (ref 1.8–7.7)
NEUTROPHILS NFR BLD: 59.3 % (ref 38–73)
NONHDLC SERPL-MCNC: 133 MG/DL
NRBC BLD-RTO: 0 /100 WBC
PLATELET # BLD AUTO: 158 K/UL (ref 150–350)
PMV BLD AUTO: 11.8 FL (ref 9.2–12.9)
POTASSIUM SERPL-SCNC: 4 MMOL/L (ref 3.5–5.1)
PROT SERPL-MCNC: 7.1 G/DL (ref 6–8.4)
RBC # BLD AUTO: 5.37 M/UL (ref 4–5.4)
SODIUM SERPL-SCNC: 143 MMOL/L (ref 136–145)
TRIGL SERPL-MCNC: 130 MG/DL (ref 30–150)
TSH SERPL DL<=0.005 MIU/L-ACNC: 1.89 UIU/ML (ref 0.4–4)
WBC # BLD AUTO: 7.9 K/UL (ref 3.9–12.7)

## 2019-08-20 PROCEDURE — 84443 ASSAY THYROID STIM HORMONE: CPT

## 2019-08-20 PROCEDURE — 36415 COLL VENOUS BLD VENIPUNCTURE: CPT | Mod: PO

## 2019-08-20 PROCEDURE — 83036 HEMOGLOBIN GLYCOSYLATED A1C: CPT

## 2019-08-20 PROCEDURE — 80061 LIPID PANEL: CPT

## 2019-08-20 PROCEDURE — 85025 COMPLETE CBC W/AUTO DIFF WBC: CPT

## 2019-08-20 PROCEDURE — 80053 COMPREHEN METABOLIC PANEL: CPT

## 2019-08-20 PROCEDURE — 82306 VITAMIN D 25 HYDROXY: CPT

## 2019-08-27 ENCOUNTER — OFFICE VISIT (OUTPATIENT)
Dept: INTERNAL MEDICINE | Facility: CLINIC | Age: 64
End: 2019-08-27
Payer: MEDICARE

## 2019-08-27 VITALS
SYSTOLIC BLOOD PRESSURE: 118 MMHG | WEIGHT: 293 LBS | HEART RATE: 74 BPM | BODY MASS INDEX: 47.09 KG/M2 | TEMPERATURE: 99 F | DIASTOLIC BLOOD PRESSURE: 78 MMHG | RESPIRATION RATE: 16 BRPM | HEIGHT: 66 IN

## 2019-08-27 DIAGNOSIS — M17.0 PRIMARY OSTEOARTHRITIS OF BOTH KNEES: ICD-10-CM

## 2019-08-27 DIAGNOSIS — G47.33 OSA ON CPAP: ICD-10-CM

## 2019-08-27 DIAGNOSIS — M79.672 BILATERAL FOOT PAIN: ICD-10-CM

## 2019-08-27 DIAGNOSIS — G93.2 PSEUDOTUMOR CEREBRI: ICD-10-CM

## 2019-08-27 DIAGNOSIS — Z00.00 ANNUAL PHYSICAL EXAM: ICD-10-CM

## 2019-08-27 DIAGNOSIS — G43.009 MIGRAINE WITHOUT AURA, NOT REFRACTORY: ICD-10-CM

## 2019-08-27 DIAGNOSIS — M79.671 BILATERAL FOOT PAIN: ICD-10-CM

## 2019-08-27 DIAGNOSIS — I10 ESSENTIAL HYPERTENSION: Primary | ICD-10-CM

## 2019-08-27 DIAGNOSIS — E66.01 MORBID OBESITY WITH BODY MASS INDEX (BMI) OF 60.0 TO 69.9 IN ADULT: ICD-10-CM

## 2019-08-27 DIAGNOSIS — E23.6 EMPTY SELLA SYNDROME: ICD-10-CM

## 2019-08-27 DIAGNOSIS — F41.9 MILD ANXIETY: ICD-10-CM

## 2019-08-27 DIAGNOSIS — E78.49 OTHER HYPERLIPIDEMIA: ICD-10-CM

## 2019-08-27 PROBLEM — R52 PAIN AGGRAVATED BY WALKING: Status: RESOLVED | Noted: 2019-03-31 | Resolved: 2019-08-27

## 2019-08-27 PROBLEM — E55.9 VITAMIN D INSUFFICIENCY: Status: RESOLVED | Noted: 2018-05-21 | Resolved: 2019-08-27

## 2019-08-27 PROBLEM — M17.11 PRIMARY OSTEOARTHRITIS OF RIGHT KNEE: Status: ACTIVE | Noted: 2019-08-27

## 2019-08-27 PROBLEM — D69.6 THROMBOCYTOPENIA: Status: RESOLVED | Noted: 2018-05-21 | Resolved: 2019-08-27

## 2019-08-27 PROCEDURE — 99999 PR PBB SHADOW E&M-EST. PATIENT-LVL III: ICD-10-PCS | Mod: PBBFAC,,, | Performed by: INTERNAL MEDICINE

## 2019-08-27 PROCEDURE — 99214 PR OFFICE/OUTPT VISIT, EST, LEVL IV, 30-39 MIN: ICD-10-PCS | Mod: S$PBB,,, | Performed by: INTERNAL MEDICINE

## 2019-08-27 PROCEDURE — 99213 OFFICE O/P EST LOW 20 MIN: CPT | Mod: PBBFAC,PO | Performed by: INTERNAL MEDICINE

## 2019-08-27 PROCEDURE — 99214 OFFICE O/P EST MOD 30 MIN: CPT | Mod: S$PBB,,, | Performed by: INTERNAL MEDICINE

## 2019-08-27 PROCEDURE — 99999 PR PBB SHADOW E&M-EST. PATIENT-LVL III: CPT | Mod: PBBFAC,,, | Performed by: INTERNAL MEDICINE

## 2019-08-27 NOTE — PROGRESS NOTES
Subjective:      Joy Singer is a 64 y.o. female who presents for annual exam.    HTN- well controlled, compliant with medications. No chest pain, no palpitations.     HLD- compliant, does not eat fatty foods regularly. Denies muscle cramping or pain.    Bilateral knee pain- sees Ortho with Dr. Armendariz, receives steroid injections every few months. Knee pain makes ambulation and exercise very difficult. Takes Norco for pain as needed. Plans to pursue knee replacement.    Ophthalmology- Dr. Can  Podiatry- Dr. Alegre  Orthopedics- Dr. Armendariz  Neurology- Dr. Vincent    Family History:  family history includes Breast cancer in her maternal aunt, maternal cousin, and maternal cousin; Breast cancer (age of onset: 64) in her maternal cousin; Breast cancer (age of onset: 68) in her maternal aunt and maternal cousin; Cancer in her paternal grandmother; Diabetes in her father; Emphysema in her father and mother; Heart failure in her father and sister; Hypertension in her father; Lung cancer in her maternal aunt, maternal uncle, maternal uncle, maternal uncle, maternal uncle, and mother; Valvular heart disease in her sister.    Health Maintenance:  Health Maintenance       Date Due Completion Date    Shingles Vaccine (1 of 2) 2006 ---    Influenza Vaccine (1) 2019 ---    Mammogram 2021 1/3/2019    Lipid Panel 2024    TETANUS VACCINE 2026    Colonoscopy 2026        Eye exam: yearly, Dr. Case Can @ [facility]  Dental Exam:   S/p hysterectomy  MM2019 with Breast Surgery service  Colonoscopy done     Influenza: due  Tetanus: 2016    Exercise: minimal due to knee pain  Diet: now minimizes sodas, restaurant 1-2 times weekly, started Weight Watchers  Body mass index is 61.38 kg/m².    Meds:   Current Outpatient Medications:     acetaZOLAMIDE (DIAMOX) 250 MG tablet, Take 250 mg by mouth 2 (two) times daily., Disp: , Rfl:     amLODIPine  (NORVASC) 5 MG tablet, TAKE 1 TABLET DAILY, Disp: 90 tablet, Rfl: 3    ascorbic acid (VITAMIN C WITH MARCOS HIPS) 500 mg TbSR, Take by mouth once daily., Disp: , Rfl:     aspirin (ST. SETH ASPIRIN) 81 MG EC tablet, [place] Aspirin 81 mg tablet,delayed release  Take 1 tablet every day by oral route., Disp: , Rfl:     aspirin/acetaminophen/caffeine (EXCEDRIN MIGRAINE ORAL), Take by mouth 2 (two) times daily as needed., Disp: , Rfl:     calcium carbonate-vitamin D3 600 mg(1,500mg) -500 unit Cap, Calcium 600 with Vitamin D3 600 mg (1,500 mg)-500 unit capsule  Take 1 capsule twice a day by oral route., Disp: , Rfl:     carisoprodol (SOMA) 350 MG tablet, Take 350 mg by mouth continuous prn.  , Disp: , Rfl:     diphenhydrAMINE (BENADRYL) 25 mg capsule, Take 25 mg by mouth every 6 (six) hours as needed for Itching., Disp: , Rfl:     docusate sodium (COLACE) 100 MG capsule, Colace 100 mg capsule  Take 2 capsule twice a day by oral route., Disp: , Rfl:     folic acid (FOLVITE) 400 MCG tablet, Take 400 mcg by mouth once daily., Disp: , Rfl:     hydrocodone-acetaminophen 7.5-325mg (NORCO) 7.5-325 mg per tablet, TK 1 T PO Q 6 TO 8 H PRN, Disp: , Rfl: 0    ketoconazole (EXTINA) 2 % Foam, Extina 2 % topical foam as needed, Disp: , Rfl:     MULTIVITAMIN W-MINERALS/LUTEIN (CENTRUM SILVER ORAL), Take by mouth.  , Disp: , Rfl:     polyethylene glycol (GLYCOLAX) 17 gram/dose powder, polyethylene glycol 3350 17 gram/dose oral powder as needed, Disp: , Rfl:     pravastatin (PRAVACHOL) 40 MG tablet, TAKE 1 TABLET DAILY, Disp: 90 tablet, Rfl: 3    vitamin E 1000 UNIT capsule, Take 1,000 Units by mouth once daily., Disp: , Rfl:     PMHx:   Past Medical History:   Diagnosis Date    Breast cyst     Carotid artery occlusion     Chronic back pain     Chronic bilateral low back pain without sciatica 11/8/2016    Fibrocystic breast     HA (headache)     Hyperlipidemia     Morbid obesity with BMI of 60.0-69.9, adult      Obstructive sleep apnea (adult) (pediatric)        PSHx:     Past Surgical History:   Procedure Laterality Date    BLADDER SUSPENSION  2004    BREAST BIOPSY Bilateral 2003 and 2011    Core bx's, benign    BREAST CYST ASPIRATION      CARPAL TUNNEL RELEASE  2000    COLONOSCOPY  2008 and 2011    eye growth removal  2010    FRACTURE SURGERY      HYSTERECTOMY  2004    LUMBAR PUNCTURE / INJECTION N/A 12/29/2014    Performed by [facility]    OOPHORECTOMY      rotator cuff surgery  2011    left shoulder    toselectomy  1962       SocHx:   Social History     Socioeconomic History    Marital status:      Spouse name: Not on file    Number of children: Not on file    Years of education: Not on file    Highest education level: Not on file   Occupational History    Occupation: Retired    Social Needs    Financial resource strain: Not hard at all    Food insecurity:     Worry: Never true     Inability: Never true    Transportation needs:     Medical: No     Non-medical: No   Tobacco Use    Smoking status: Never Smoker    Smokeless tobacco: Never Used   Substance and Sexual Activity    Alcohol use: Yes     Frequency: Monthly or less     Drinks per session: 1 or 2     Binge frequency: Never     Comment: social    Drug use: No    Sexual activity: Yes     Partners: Male     Comment: socially   Lifestyle    Physical activity:     Days per week: 0 days     Minutes per session: 0 min    Stress: To some extent   Relationships    Social connections:     Talks on phone: More than three times a week     Gets together: Once a week     Attends Denominational service: Not on file     Active member of club or organization: Yes     Attends meetings of clubs or organizations: More than 4 times per year     Relationship status:    Other Topics Concern    Not on file   Social History Narrative    She is caregiver for her        Review of Systems   Constitutional:  Negative for activity change, chills, fever and unexpected weight change.   HENT: Negative for congestion, dental problem, ear discharge, ear pain, hearing loss, postnasal drip, rhinorrhea, sinus pressure, sore throat and trouble swallowing.    Eyes: Negative for discharge, redness and visual disturbance.   Respiratory: Negative for cough, chest tightness and wheezing.    Cardiovascular: Negative for chest pain and palpitations.   Gastrointestinal: Positive for constipation (Miralax, Ex-lax). Negative for abdominal pain, blood in stool, diarrhea, nausea and vomiting.   Endocrine: Negative for polydipsia and polyuria.   Genitourinary: Negative for decreased urine volume, difficulty urinating, dysuria, frequency, hematuria and menstrual problem.   Musculoskeletal: Positive for arthralgias (right knee pain is chronic but left knee pain began after a fall), back pain and gait problem (due to knee pain). Negative for joint swelling and neck pain.        IA injections with Orthopedics, due now, has new orthotic and reports that wearing it causes knee pain.   Skin: Positive for rash (uses Extina for scalp rash). Negative for color change.   Neurological: Positive for headaches. Negative for seizures and weakness.   Hematological: Negative for adenopathy.   Psychiatric/Behavioral: Positive for sleep disturbance (uses benadryl before bed and it helps). Negative for confusion and dysphoric mood. The patient is nervous/anxious (started in the last few months, reports anxiety when she needs to use her CPAP).          Answers for HPI/ROS submitted by the patient on 8/26/2019   activity change: No  unexpected weight change: No  neck pain: No  hearing loss: No  rhinorrhea: No  trouble swallowing: No  eye discharge: No  visual disturbance: No  chest tightness: No  wheezing: No  chest pain: No  palpitations: No  blood in stool: No  constipation: No  vomiting: No  diarrhea: No  polydipsia: No  polyuria: No  difficulty urinating:  No  hematuria: No  menstrual problem: No  dysuria: No  joint swelling: No  arthralgias: Yes  headaches: Yes  weakness: No  confusion: No  dysphoric mood: No          Objective:      Physical Exam   Constitutional: She is oriented to person, place, and time. Vital signs are normal. She appears well-developed and well-nourished. No distress.   HENT:   Head: Normocephalic and atraumatic.   Right Ear: Hearing, tympanic membrane, external ear and ear canal normal. Tympanic membrane is not erythematous and not bulging.   Left Ear: Hearing, tympanic membrane, external ear and ear canal normal. Tympanic membrane is not erythematous and not bulging.   Nose: Nose normal.   Mouth/Throat: Uvula is midline, oropharynx is clear and moist and mucous membranes are normal. No oropharyngeal exudate or posterior oropharyngeal erythema.   Eyes: Pupils are equal, round, and reactive to light. Conjunctivae, EOM and lids are normal. No scleral icterus.   Neck: Normal range of motion. Neck supple. No thyroid mass present.   Cardiovascular: Normal rate, regular rhythm, normal heart sounds and intact distal pulses.   No murmur heard.  Pulmonary/Chest: Effort normal and breath sounds normal. No tachypnea and no bradypnea. She has no wheezes.   Abdominal: Soft. Bowel sounds are normal. She exhibits no distension. There is no tenderness. There is no rebound.   Musculoskeletal: Normal range of motion. She exhibits no edema.        Cervical back: She exhibits tenderness and spasm. She exhibits no pain.   Lymphadenopathy:     She has no cervical adenopathy.        Right: No supraclavicular adenopathy present.        Left: No supraclavicular adenopathy present.   Neurological: She is alert and oriented to person, place, and time. She displays no tremor. Gait (due to pain) abnormal. Coordination normal.   Reflex Scores:       Bicep reflexes are 1+ on the right side and 1+ on the left side.       Patellar reflexes are 1+ on the right side and 1+ on  the left side.  Skin: Skin is warm, dry and intact. No rash noted. She is not diaphoretic.   Psychiatric: She has a normal mood and affect.   Vitals reviewed.      Assessment:       1. Essential hypertension    2. Other hyperlipidemia    3. Primary osteoarthritis of both knees    4. Pseudotumor cerebri    5. Migraine without aura, not refractory    6. LIZETH on CPAP    7. Mild anxiety    8. Bilateral foot pain    9. Morbid obesity with body mass index (BMI) of 60.0 to 69.9 in adult    10. Empty sella syndrome    11. Annual physical exam        Plan:         1. Essential hypertension  - BP is controlled, continue amlodipine     2. Other hyperlipidemia  - stable, continue pravastatin    3. Primary osteoarthritis of both knees  - due to steroid injection, plans to increase physical activity and lose weight in preparation for knee replacement    4. Pseudotumor cerebri  - stable, monitored by Neurology  - continue acetazolamide    5. Migraine without aura, not refractory  - stable, managed by Neurology  - uses Excedrin as needed    6. LIZETH on CPAP  - she has increased compliance by using benadryl before bed    7. Mild anxiety  - mild, associated with CPAP use, call if worsens    8. Bilateral foot pain  - stable, has orthotic device, will follow-up with Ortho    9. Morbid obesity with body mass index (BMI) of 60.0 to 69.9 in adult  - started Weight Watchers, encouraged patient to continue program  - her goal is to achieve BMI <40 to proceed with knee replacement but would need to lose 100+ pounds    10. Empty sella syndrome  - per outside imaging from Neurologist, will request records    11. Annual physical exam  - reviewed recent labs with patient  - advised Shingrix vaccine at her pharmacy    RTC in 6 months or sooner if needed    Faiza Kuhn MD

## 2019-09-16 ENCOUNTER — TELEPHONE (OUTPATIENT)
Dept: SURGERY | Facility: CLINIC | Age: 64
End: 2019-09-16

## 2019-09-16 NOTE — TELEPHONE ENCOUNTER
diagnostic mmg 1-3-19 ordered for pt reported right palpable breast mass with pain and possible left axillary adenopathy on exam, bilateral complete breast US ordered for high risk for breast cancer as patient is unable to do breast MRI's at this time

## 2019-11-19 ENCOUNTER — TELEPHONE (OUTPATIENT)
Dept: SURGERY | Facility: CLINIC | Age: 64
End: 2019-11-19

## 2019-11-19 NOTE — TELEPHONE ENCOUNTER
Spoke with pt to get her scheduled for her follow up appointment with Dr Wall and her MMG  ----- Message from Sonya Mabry RN sent at 11/19/2019 10:17 AM CST -----  Contact: pt#[phone]  Please call patient to schedule.  She can see Dr. Wall per her request, will not be due for annual bilateral screening mmg until after 1/3/20.  Please let her know that it is a screening mammogram so it has to be 366 days from her last mmg in order to be covered by insurance.  Please schedule her to see [facility] first and then have her mmg, she always has something wrong with her or a breast complaint when we see her!  Thanks.    Sonya    ----- Message -----  From: Joyce Rosenbaum  Sent: 11/19/2019   9:44 AM CST  To: [facility] Staff    Pt is calling to schedule a recall with NP but pt do not want to see a NP. She wants to see Dr Wall. Please advise

## 2019-12-04 ENCOUNTER — LAB VISIT (OUTPATIENT)
Dept: LAB | Facility: HOSPITAL | Age: 64
End: 2019-12-04
Attending: INTERNAL MEDICINE
Payer: MEDICARE

## 2019-12-04 DIAGNOSIS — I10 ESSENTIAL HYPERTENSION: ICD-10-CM

## 2019-12-04 DIAGNOSIS — E78.00 PURE HYPERCHOLESTEROLEMIA: ICD-10-CM

## 2019-12-04 LAB
ALT SERPL W/O P-5'-P-CCNC: 18 U/L (ref 10–44)
ANION GAP SERPL CALC-SCNC: 10 MMOL/L (ref 8–16)
AST SERPL-CCNC: 21 U/L (ref 10–40)
BUN SERPL-MCNC: 19 MG/DL (ref 8–23)
CALCIUM SERPL-MCNC: 9.5 MG/DL (ref 8.7–10.5)
CHLORIDE SERPL-SCNC: 115 MMOL/L (ref 95–110)
CHOLEST SERPL-MCNC: 182 MG/DL (ref 120–199)
CHOLEST/HDLC SERPL: 4 {RATIO} (ref 2–5)
CO2 SERPL-SCNC: 22 MMOL/L (ref 23–29)
CREAT SERPL-MCNC: 1 MG/DL (ref 0.5–1.4)
EST. GFR  (AFRICAN AMERICAN): >60 ML/MIN/1.73 M^2
EST. GFR  (NON AFRICAN AMERICAN): >60 ML/MIN/1.73 M^2
GLUCOSE SERPL-MCNC: 109 MG/DL (ref 70–110)
HDLC SERPL-MCNC: 45 MG/DL (ref 40–75)
HDLC SERPL: 24.7 % (ref 20–50)
LDLC SERPL CALC-MCNC: 107.6 MG/DL (ref 63–159)
NONHDLC SERPL-MCNC: 137 MG/DL
POTASSIUM SERPL-SCNC: 4 MMOL/L (ref 3.5–5.1)
SODIUM SERPL-SCNC: 147 MMOL/L (ref 136–145)
TRIGL SERPL-MCNC: 147 MG/DL (ref 30–150)

## 2019-12-04 PROCEDURE — 80061 LIPID PANEL: CPT

## 2019-12-04 PROCEDURE — 80048 BASIC METABOLIC PNL TOTAL CA: CPT

## 2019-12-04 PROCEDURE — 36415 COLL VENOUS BLD VENIPUNCTURE: CPT | Mod: PO

## 2019-12-04 PROCEDURE — 84460 ALANINE AMINO (ALT) (SGPT): CPT

## 2019-12-04 PROCEDURE — 84450 TRANSFERASE (AST) (SGOT): CPT

## 2019-12-04 RX ORDER — AMLODIPINE BESYLATE 5 MG/1
TABLET ORAL
Qty: 90 TABLET | Refills: 4 | Status: SHIPPED | OUTPATIENT
Start: 2019-12-04 | End: 2020-04-23

## 2019-12-04 RX ORDER — PRAVASTATIN SODIUM 40 MG/1
TABLET ORAL
Qty: 90 TABLET | Refills: 4 | Status: SHIPPED | OUTPATIENT
Start: 2019-12-04 | End: 2021-03-20

## 2019-12-05 ENCOUNTER — PATIENT OUTREACH (OUTPATIENT)
Dept: ADMINISTRATIVE | Facility: OTHER | Age: 64
End: 2019-12-05

## 2019-12-11 ENCOUNTER — PATIENT OUTREACH (OUTPATIENT)
Dept: ADMINISTRATIVE | Facility: OTHER | Age: 64
End: 2019-12-11

## 2020-01-02 ENCOUNTER — PATIENT OUTREACH (OUTPATIENT)
Dept: ADMINISTRATIVE | Facility: OTHER | Age: 65
End: 2020-01-02

## 2020-01-06 ENCOUNTER — HOSPITAL ENCOUNTER (OUTPATIENT)
Dept: RADIOLOGY | Facility: HOSPITAL | Age: 65
Discharge: HOME | End: 2020-01-06
Attending: SURGERY
Payer: MEDICARE

## 2020-01-06 ENCOUNTER — OFFICE VISIT (OUTPATIENT)
Dept: SURGERY | Facility: CLINIC | Age: 65
End: 2020-01-06
Payer: MEDICARE

## 2020-01-06 VITALS
BODY MASS INDEX: 47.09 KG/M2 | DIASTOLIC BLOOD PRESSURE: 77 MMHG | SYSTOLIC BLOOD PRESSURE: 161 MMHG | HEIGHT: 66 IN | WEIGHT: 293 LBS | TEMPERATURE: 97 F | HEART RATE: 72 BPM

## 2020-01-06 DIAGNOSIS — Z80.3 FAMILY HISTORY OF BREAST CANCER: Primary | ICD-10-CM

## 2020-01-06 DIAGNOSIS — Z12.39 BREAST CANCER SCREENING, HIGH RISK PATIENT: Primary | ICD-10-CM

## 2020-01-06 DIAGNOSIS — Z12.31 ENCOUNTER FOR SCREENING MAMMOGRAM FOR MALIGNANT NEOPLASM OF BREAST: ICD-10-CM

## 2020-01-06 PROCEDURE — 77063 BREAST TOMOSYNTHESIS BI: CPT | Mod: 26,,, | Performed by: RADIOLOGY

## 2020-01-06 PROCEDURE — 99999 PR PBB SHADOW E&M-EST. PATIENT-LVL III: ICD-10-PCS | Mod: PBBFAC,,, | Performed by: SURGERY

## 2020-01-06 PROCEDURE — 99213 OFFICE O/P EST LOW 20 MIN: CPT | Mod: PBBFAC | Performed by: SURGERY

## 2020-01-06 PROCEDURE — 77067 SCR MAMMO BI INCL CAD: CPT | Mod: 26,,, | Performed by: RADIOLOGY

## 2020-01-06 PROCEDURE — 77063 MAMMO DIGITAL SCREENING BILAT WITH TOMOSYNTHESIS_CAD: ICD-10-PCS | Mod: 26,,, | Performed by: RADIOLOGY

## 2020-01-06 PROCEDURE — 99213 PR OFFICE/OUTPT VISIT, EST, LEVL III, 20-29 MIN: ICD-10-PCS | Mod: S$PBB,,, | Performed by: SURGERY

## 2020-01-06 PROCEDURE — 99999 PR PBB SHADOW E&M-EST. PATIENT-LVL III: CPT | Mod: PBBFAC,,, | Performed by: SURGERY

## 2020-01-06 PROCEDURE — 77067 SCR MAMMO BI INCL CAD: CPT | Mod: TC,PO

## 2020-01-06 PROCEDURE — 77067 MAMMO DIGITAL SCREENING BILAT WITH TOMOSYNTHESIS_CAD: ICD-10-PCS | Mod: 26,,, | Performed by: RADIOLOGY

## 2020-01-06 PROCEDURE — 99213 OFFICE O/P EST LOW 20 MIN: CPT | Mod: S$PBB,,, | Performed by: SURGERY

## 2020-01-06 NOTE — PROGRESS NOTES
"Patient ID: Joy Singer is a 64 y.o. female.    Chief Complaint: No chief complaint on file.        HPI:  Established patient presents today for follow-up.    Breast History:    Hx of [facility]     Personal history of breast cancer:  No     Personal history of breast biopsy:  Hx of mammotome biopsies with Dr. Seema Aguilar, several among both breasts, all benign per patient, pt states last was well over 2 years ago, pt has since transferred her breast-related care to Dr. Wall, pt does not recall ever being told she has LCIS or atypia/hyperplasia of the breast(s)     Personal history of breast surgery:  no    [employer] Common Hereditary Cancers Panel, heterozygous VUS identified in BRCA2, in 2019     At 1/3/2019 visit w/ me:   Patient reports that abovementioned right breast pain is on "entire right side of right breast" and has not changed in any way, no worse, no better.  Sometimes it goes away for days, some days it's worse than others.  She states, "I'm not here today for that, just for my annual screening."  Onset about 7 months ago.  Presently 2-3/10.  No associated mass or skin change.  Diclofenac cream and ice help to relieve pain.    INTERVAL:  Doing well.  Reports occasional right breast tenderness. It is tender today.  Attempted to see nutrition and insurance denied.      Breast Imaging  bil"[facility], [place]" mmg and bilat ltd breast US 1/3/2019, report reviewed:  scattered areas of fibroglandular density, BI-RADS 1, TC lifetime risk 14.56%     Interval Breast History     Patient reports R breast pain (lateral), onset about 1 yr ago.  Comes and goes.  Improving.  Infrequent.    Patient reports 1 occurrence of R upper breast pain, only happened once in last couple weeks.    Patient denies palpable breast mass, breast-related skin changes, nipple discharge and nipple retraction.  No other breast-related concerns.     GYN History:  Age of menarche:  11 y/o  , first live birth at 28 y/o  Uterus and ovaries:  S/p BSO " at 50 y/o  Menopause:  Surgical at 50 y/o  HRT usage:  never    Other Oncologic History:  Personal history of other cancer not abovementioned:  no  Personal history of genetic testing:  [employer] Common Hereditary Cancers Panel, heterozygous VUS identified in BRCA2, in 2019     Social History:  Tobacco use:  denies  Alcohol use:  Very seldom  Swims, hasn't exercised in a while, plans to resume     Family History:     Family Oncologic History     Ashkenazi [employer] ancestry:  no  History of genetic testing in relatives:  Pt     Family History   Problem Relation Age of Onset    Breast cancer Maternal Aunt         50s    Breast cancer Maternal Cousin 68        daughter of aunt dx'd in 50s    Breast cancer Maternal Cousin 64        mat 1st cousin, mother unaffected, myRisk with SIPX 2017 was negative ([place]ette)    Cancer Paternal Grandmother         brain cancer    Breast cancer Maternal Aunt 68    Breast cancer Maternal Cousin         1st cousin, daughter of unaffected aunt    Breast cancer Maternal Cousin         mother's brother's daughter    Ovarian cancer Neg Hx          Patient's Breast Cancer Risk Assessment Scores:  the patient's breast cancer risk assessment scores were calculated today as follows:  Tyrer-Cuzick lifetime risk:  21.7%      Review of Systems - See HPI.  Objective:   Physical Exam   Pulmonary/Chest: Effort normal. No respiratory distress. She exhibits no mass, no edema and no deformity. Right breast exhibits tenderness. Right breast exhibits no inverted nipple, no mass, no nipple discharge and no skin change. Left breast exhibits tenderness. Left breast exhibits no inverted nipple, no mass, no nipple discharge and no skin change. No breast swelling. Breasts are symmetrical.   Bilat breasts with diffuse TTP.  No palpable abnormality or concerning skin change appreciated.   Lymphadenopathy:     She has no cervical adenopathy.     She has no axillary adenopathy.        Right: No supraclavicular  adenopathy present.        Left: No supraclavicular adenopathy present.     Assessment:       No diagnosis found.      Plan:     Clinically ELEANOR today.  rtc 1 year  6 month whole breast screening ultrasound.    Refer to bariatricians/weight loss options.

## 2020-01-10 ENCOUNTER — PES CALL (OUTPATIENT)
Dept: ADMINISTRATIVE | Facility: CLINIC | Age: 65
End: 2020-01-10

## 2020-02-12 ENCOUNTER — PATIENT OUTREACH (OUTPATIENT)
Dept: ADMINISTRATIVE | Facility: OTHER | Age: 65
End: 2020-02-12

## 2020-02-14 ENCOUNTER — OFFICE VISIT (OUTPATIENT)
Dept: SLEEP MEDICINE | Facility: CLINIC | Age: 65
End: 2020-02-14
Payer: MEDICARE

## 2020-02-14 VITALS
HEIGHT: 66 IN | HEART RATE: 58 BPM | SYSTOLIC BLOOD PRESSURE: 126 MMHG | WEIGHT: 293 LBS | BODY MASS INDEX: 47.09 KG/M2 | DIASTOLIC BLOOD PRESSURE: 76 MMHG

## 2020-02-14 DIAGNOSIS — G47.33 OBSTRUCTIVE SLEEP APNEA: Primary | ICD-10-CM

## 2020-02-14 PROCEDURE — 99214 OFFICE O/P EST MOD 30 MIN: CPT | Mod: PBBFAC,PO | Performed by: NURSE PRACTITIONER

## 2020-02-14 PROCEDURE — 99213 OFFICE O/P EST LOW 20 MIN: CPT | Mod: S$PBB,,, | Performed by: NURSE PRACTITIONER

## 2020-02-14 PROCEDURE — 99999 PR PBB SHADOW E&M-EST. PATIENT-LVL IV: CPT | Mod: PBBFAC,,, | Performed by: NURSE PRACTITIONER

## 2020-02-14 PROCEDURE — 99999 PR PBB SHADOW E&M-EST. PATIENT-LVL IV: ICD-10-PCS | Mod: PBBFAC,,, | Performed by: NURSE PRACTITIONER

## 2020-02-14 PROCEDURE — 99213 PR OFFICE/OUTPT VISIT, EST, LEVL III, 20-29 MIN: ICD-10-PCS | Mod: S$PBB,,, | Performed by: NURSE PRACTITIONER

## 2020-02-14 NOTE — PROGRESS NOTES
This 64 y.o. female patient returns for the management of obstructive sleep apnea    Continues to use wisp mask w/o chin strap. Gettting regular supplies. Continues to feel pressure very high, hard to exhale fully. Using qhs therapy. ESS=3. Can't have bipap titration study b/c won't make it to bathroom in time/getting unwired/etc...  bp stable  Interrogation- ag use 7:48h/night. AHI 0.3, 90 %tile 17cm. 30/30dused, 0% PB Wisp mask    HISTORY  [facility]- 7/31/18  She continues to use apap 14-20cm nightly. Continued improvement of snoring and headaches. Still disrupted sleep-->nocturia q2h attributes to pain. Takes 50% of the time norco qhs, tries to take ASA instead. Prn soma use (back/knee/wrist pains). Getting regular supplies. LOST 26#. Hasn't been swimming in over year due to recurrent ear infection. Continued dry ears and teeth clenching. Using 90% tile 17cm with WISP nasal mask. No chin strap. Denies bloating/belching. Hard to exhale though against pressure at times. Current chest cold symptoms.   Interrogation- 30d avg 8:42h/n.n AHI 0.2, 90% tile 16-17cm. 0-3% leak. 30/30d>4h. 0% periodic  BP suboptimal today  SIDE sleeper  ESS=2      PRIOR SLEEP HISTORY Talent:   reports that she has intermittent breathing interruption in her sleep  Snoring.  Wakes up with dry mouth - which gives headaches  Bathroom every 2 hours.  Groggy in morning  Morning headaches after mouth breathing  ESS = 3  Denies limb kicking or RLS.  Left arm pain - contributes to sleep disturbances  Soma and Vicodin for back pain; knee pain    1/2018:   The patient stated her previous DME does not accept her insurance. She is requesting for an order and her insurance to be sent to the DME provided below. She already confirmed that they accept her insurance. ESS=3    [facility]   [id number]    Patient using CPAP nightly. She reports sleeping longer with CPAP  Snoring cessation  Some teeth grinding, some lingual  "protrusion at night, which is causing lower teeth shift per her dentist-  Using a Wisp mask, straps adjustment/fixing it or leaks at times      Home Sleep Study: 12/11/14: +LIZETH, AHI 15, %time <90% = 9.9%        REVIEW OF SYSTEMS:  Sleep related symptoms as per HPI. 12# gain. Otherwise, a balance of systems reviewed is negative.    PHYSICAL EXAM:  /76 (BP Location: Right arm, Patient Position: Sitting, BP Method: Large (Automatic))   Pulse (!) 58   Ht 5' 6" (1.676 m)   Wt (!) 171.5 kg (378 lb)   BMI 61.01 kg/m²   GENERAL: Well groomed; Normally developed; morbid obese    ASSESSMENT:    1. Obstructive Sleep Apnea, moderate . Symptoms remain improved, benefiting from therapy. AHI<5. Excellent adherence BUT ongoing pressure intolerance/pressure too high  Medical comorbidiies- morbid obesity, HTN    PLAN:  1. Continue autoPAP 14 - 20 cm, Defers at this time due to frequent nocturia/incontinence having bipap titration study so will instead order autobipap max IPAP 20cm/min EPAP, PS 2-8cm.  Iza PORTILLO prn supplies  2.Discussed effectiveness of therapy and potential ramifications of untreated LIZETH, including heart disease, hypertension, cognitive difficulties, stroke, and diabetes.    3. RTC annually otherwise sooner if needed. See PCP re: HTN mgt  "

## 2020-02-26 ENCOUNTER — PATIENT MESSAGE (OUTPATIENT)
Dept: SLEEP MEDICINE | Facility: CLINIC | Age: 65
End: 2020-02-26

## 2020-04-06 ENCOUNTER — PATIENT MESSAGE (OUTPATIENT)
Dept: SLEEP MEDICINE | Facility: CLINIC | Age: 65
End: 2020-04-06

## 2020-04-23 ENCOUNTER — OFFICE VISIT (OUTPATIENT)
Dept: CARDIOLOGY | Facility: CLINIC | Age: 65
End: 2020-04-23
Payer: MEDICARE

## 2020-04-23 VITALS
DIASTOLIC BLOOD PRESSURE: 80 MMHG | HEART RATE: 65 BPM | BODY MASS INDEX: 47.09 KG/M2 | SYSTOLIC BLOOD PRESSURE: 138 MMHG | WEIGHT: 293 LBS | HEIGHT: 66 IN

## 2020-04-23 DIAGNOSIS — M79.89 LEG SWELLING: ICD-10-CM

## 2020-04-23 DIAGNOSIS — I10 ESSENTIAL HYPERTENSION: Primary | ICD-10-CM

## 2020-04-23 PROCEDURE — G0463 HOSPITAL OUTPT CLINIC VISIT: HCPCS | Mod: PO

## 2020-04-23 PROCEDURE — 99211 OFF/OP EST MAY X REQ PHY/QHP: CPT | Mod: PO

## 2020-04-23 PROCEDURE — 99213 OFFICE O/P EST LOW 20 MIN: CPT | Mod: 95,,, | Performed by: INTERNAL MEDICINE

## 2020-04-23 PROCEDURE — 99213 PR OFFICE/OUTPT VISIT, EST, LEVL III, 20-29 MIN: ICD-10-PCS | Mod: 95,,, | Performed by: INTERNAL MEDICINE

## 2020-04-23 RX ORDER — IRBESARTAN 75 MG/1
75 TABLET ORAL NIGHTLY
Qty: 90 TABLET | Refills: 3 | Status: SHIPPED | OUTPATIENT
Start: 2020-04-23 | End: 2020-07-02 | Stop reason: SDUPTHER

## 2020-04-23 NOTE — PROGRESS NOTES
The patient location is: Home.  The chief complaint leading to consultation is: Hypertension  Visit type: audiovisual  Total time spent with patient: 20 min  Each patient to whom he or she provides medical services by telemedicine is:  (1) informed of the relationship between the physician and patient and the respective role of any other health care provider with respect to management of the patient; and (2) notified that he or she may decline to receive medical services by telemedicine and may withdraw from such care at any time.    Subjective:   Patient ID:  Joy Singer is a 64 y.o. female who presents for follow-up of Hypertension      Problem List:  HTN  Sleep apnea  Pseudotumor cerebri  Multiple colonic polyp  BMI ~60    Dermatology- Dr. Zavaleta  GI- Dr. Wong  Orthopedics- Dr. Armendariz  ENT- Dr. Brianhipinttres  Neuro-ophthalmology- Dr. West (\[facility]")  Neurology- Dr. Vincent    HPI:   Joy Singer reports bilateral lower extremity swelling.  She had reported this to Dr. Kuhn last year and underwent noninvasive arterial and venous imaging. No DVT was present.   She reports that the swelling is worse when getting out of bed in the morning and it is later in the day.  She sleeps on her side.  She does not report shortness of breath.  She takes amlodipine for hypertension.      Review of systems:  Negative except as above.    Current Outpatient Medications   Medication Sig    acetaZOLAMIDE (DIAMOX) 250 MG tablet Take 250 mg by mouth 2 (two) times daily.    amLODIPine (NORVASC) 5 MG tablet TAKE 1 TABLET DAILY    ascorbic acid (VITAMIN C WITH MARCOS HIPS) 500 mg TbSR Take by mouth once daily.    aspirin (ST. SETH ASPIRIN) 81 MG EC tablet [place] Aspirin 81 mg tablet,delayed release   Take 1 tablet every day by oral route.    aspirin/acetaminophen/caffeine (EXCEDRIN MIGRAINE ORAL) Take by mouth 2 (two) times daily as needed.    calcium carbonate-vitamin D3 600 mg(1,500mg) -500 unit Cap Calcium 600  "with Vitamin D3 600 mg (1,500 mg)-500 unit capsule   Take 1 capsule twice a day by oral route.    carisoprodol (SOMA) 350 MG tablet Take 350 mg by mouth continuous prn.      diphenhydrAMINE (BENADRYL) 25 mg capsule Take 25 mg by mouth every 6 (six) hours as needed for Itching.    docusate sodium (COLACE) 100 MG capsule Colace 100 mg capsule   Take 2 capsule twice a day by oral route.    folic acid (FOLVITE) 400 MCG tablet Take 400 mcg by mouth once daily.    hydrocodone-acetaminophen 7.5-325mg (NORCO) 7.5-325 mg per tablet TK 1 T PO Q 6 TO 8 H PRN    ketoconazole (EXTINA) 2 % Foam Extina 2 % topical foam as needed    MULTIVITAMIN W-MINERALS/LUTEIN (CENTRUM SILVER ORAL) Take 1 tablet by mouth Daily.     polyethylene glycol (GLYCOLAX) 17 gram/dose powder polyethylene glycol 3350 17 gram/dose oral powder as needed    pravastatin (PRAVACHOL) 40 MG tablet TAKE 1 TABLET DAILY    vitamin E 1000 UNIT capsule Take 1,000 Units by mouth once daily.     No current facility-administered medications for this visit.        Social history:  Joy Singer  reports that she has never smoked. She has never used smokeless tobacco. She reports that she drinks alcohol. She reports that she does not use drugs.      Objective:   /80   Pulse 65   Ht 5' 6" (1.676 m)   Wt (!) 174.6 kg (385 lb)   BMI 62.14 kg/m²          Lab Results   Component Value Date    CHOL 182 12/04/2019    HDL 45 12/04/2019    LDLCALC 107.6 12/04/2019    TRIG 147 12/04/2019    CHOLHDL 24.7 12/04/2019     Lab Results   Component Value Date     12/04/2019    CREATININE 1.0 12/04/2019    BUN 19 12/04/2019     (H) 12/04/2019    K 4.0 12/04/2019     (H) 12/04/2019    CO2 22 (L) 12/04/2019     Lab Results   Component Value Date    ALT 18 12/04/2019    AST 21 12/04/2019    ALKPHOS 68 08/20/2019    BILITOT 0.3 08/20/2019            Assessment and Plan:     1. Essential hypertension  2. Leg swelling  Switch amlodipine to irbesartan.  " Monitor blood pressure at home.  Titrate dose based on blood pressure in 1 week.  If leg swelling does not improve significantly, add hydrochlorothiazide 25 mg.  She will also likely need a potassium supplement.  She prefers a capsule.  - irbesartan (AVAPRO) 75 MG tablet; Take 1 tablet (75 mg total) by mouth every evening.  Dispense: 90 tablet; Refill: 3  - Basic metabolic panel in 4-6 weeks    If leg swelling persists will obtain venous competency study and referred to vascular medicine.  Follow up in about 6 months (around 10/23/2020).

## 2020-04-23 NOTE — PATIENT INSTRUCTIONS
Start taking irbesartan in place of amlodipine  Check your blood pressure regularly.  If blood pressure is elevated (SBPs >150 mm Hg) by the middle of next week, let us know and I will increase the irbesartan from 75 mg to 150 mg.  If switching amlodipine to irbesartan reduces the swelling in your legs significantly then there is no need to proceed to the next step.  If the swelling does not decrease then I will add hydrochlorothiazide (aka HCTZ) and potassium.  Blood test in 4-6 weeks

## 2020-05-02 ENCOUNTER — PATIENT MESSAGE (OUTPATIENT)
Dept: CARDIOLOGY | Facility: CLINIC | Age: 65
End: 2020-05-02

## 2020-05-02 DIAGNOSIS — I10 ESSENTIAL HYPERTENSION: ICD-10-CM

## 2020-05-02 DIAGNOSIS — M79.89 LEG SWELLING: Primary | ICD-10-CM

## 2020-05-06 ENCOUNTER — TELEPHONE (OUTPATIENT)
Dept: INTERNAL MEDICINE | Facility: CLINIC | Age: 65
End: 2020-05-06

## 2020-05-06 DIAGNOSIS — F41.9 MILD ANXIETY: Primary | ICD-10-CM

## 2020-05-06 RX ORDER — CHLORTHALIDONE 25 MG/1
25 TABLET ORAL DAILY
Qty: 30 TABLET | Refills: 11 | Status: SHIPPED | OUTPATIENT
Start: 2020-05-06 | End: 2021-06-07

## 2020-05-06 RX ORDER — POTASSIUM CHLORIDE 750 MG/1
10 TABLET, EXTENDED RELEASE ORAL ONCE
Qty: 30 TABLET | Refills: 11 | Status: SHIPPED | OUTPATIENT
Start: 2020-05-06 | End: 2020-05-06

## 2020-05-06 RX ORDER — HYDROXYZINE HYDROCHLORIDE 25 MG/1
25 TABLET, FILM COATED ORAL NIGHTLY PRN
Qty: 20 TABLET | Refills: 0 | Status: SHIPPED | OUTPATIENT
Start: 2020-05-06 | End: 2020-10-06

## 2020-05-06 NOTE — TELEPHONE ENCOUNTER
May try OTC melatonin 5-10mg before bedtime.    Spoke about mild anxiety in the past. If she is anxious every day, she would need a daily medication to control anxiety. Lexapro would be an option.    If she only has brief episodes of anxiety and they only happen a few times each week, then she may use hydroxyzine as needed. This is also good for panic attacks but may cause drowsiness. If she uses hydroxyzine, then she should stop using benadryl as needed.

## 2020-05-06 NOTE — TELEPHONE ENCOUNTER
Pt states she has not been checking her blood pressure at home. Pt states she will begin to monitor her blood pressure beginning today and will notify our office of readings in 2 weeks.

## 2020-05-06 NOTE — TELEPHONE ENCOUNTER
Spoke to pt.  Notified and verbalized understanding of your treatment options.    She doesn't want to take something every day, like Lexapro.    She said she's fine during the day.  States her anxiety is mostly at night at bedtime and she can't turn her mind off.  Feels herself near panic.  Doesn't happen every night, but most nights.

## 2020-05-06 NOTE — TELEPHONE ENCOUNTER
Pl get BP readings. Start chlorthalidone and K now because swelling has not improved. Based on her BP readings I may also increase her irbesartan from 75 to 150 mg  Ask her to send some more readings in 2-3 weeks. Will need a BMP at that time - has one scheduled for 5/26

## 2020-05-06 NOTE — TELEPHONE ENCOUNTER
Dr Hughes's office called.  Pt recently saw Dr Hughes.    Pt was asking for medication to help with anxiety and insomnia, which Dr Hughes doesn't prescribe.    LOV:  8/27/19  Next scheduled:  5/26  Recall set for Aug 2020 annual    Pt has an appt on 5/26 for anxiety and insomnia.  She's hoping you could prescribe something before then, to help her now.    Please advise.

## 2020-05-06 NOTE — TELEPHONE ENCOUNTER
If she is awake due to anxiety, hydroxyzine may help. Try hydroxyzine 25mg when she feels anxious at night. Call if there is no improvement and will try another medication.

## 2020-05-18 ENCOUNTER — PATIENT MESSAGE (OUTPATIENT)
Dept: INTERNAL MEDICINE | Facility: CLINIC | Age: 65
End: 2020-05-18

## 2020-05-18 NOTE — TELEPHONE ENCOUNTER
Spoke to pt.  She is scheduled to see you next week.    States the hydroxyzine isn't helping.  She's still having small panic attacks at bedtime.  Said she can wait to discuss with you next week.    However, she also said she read that you can't use ketoconazole shampoo while taking hydroxyzine.  She wants to confirm whether that's true.  She has not been using the shampoo.

## 2020-05-18 NOTE — TELEPHONE ENCOUNTER
She may try hydroxyzine 50mg as needed when she is anxious.    Absorption of topical ketoconazole topical is minimal and it should not interact with hydroxyzine.

## 2020-05-26 ENCOUNTER — OFFICE VISIT (OUTPATIENT)
Dept: INTERNAL MEDICINE | Facility: CLINIC | Age: 65
End: 2020-05-26
Payer: MEDICARE

## 2020-05-26 ENCOUNTER — LAB VISIT (OUTPATIENT)
Dept: LAB | Facility: HOSPITAL | Age: 65
End: 2020-05-26
Attending: INTERNAL MEDICINE
Payer: MEDICARE

## 2020-05-26 VITALS
SYSTOLIC BLOOD PRESSURE: 124 MMHG | BODY MASS INDEX: 47.09 KG/M2 | OXYGEN SATURATION: 97 % | DIASTOLIC BLOOD PRESSURE: 84 MMHG | HEIGHT: 66 IN | TEMPERATURE: 99 F | RESPIRATION RATE: 16 BRPM | WEIGHT: 293 LBS | HEART RATE: 84 BPM

## 2020-05-26 DIAGNOSIS — G47.00 INSOMNIA, UNSPECIFIED TYPE: ICD-10-CM

## 2020-05-26 DIAGNOSIS — D53.9 NUTRITIONAL ANEMIA, UNSPECIFIED: ICD-10-CM

## 2020-05-26 DIAGNOSIS — I10 ESSENTIAL HYPERTENSION: Primary | ICD-10-CM

## 2020-05-26 DIAGNOSIS — I10 ESSENTIAL HYPERTENSION: ICD-10-CM

## 2020-05-26 DIAGNOSIS — Z71.89 ADVICE GIVEN ABOUT COVID-19 VIRUS INFECTION: ICD-10-CM

## 2020-05-26 DIAGNOSIS — E66.01 MORBID OBESITY WITH BODY MASS INDEX (BMI) OF 60.0 TO 69.9 IN ADULT: ICD-10-CM

## 2020-05-26 DIAGNOSIS — D64.9 ANEMIA, UNSPECIFIED TYPE: ICD-10-CM

## 2020-05-26 DIAGNOSIS — E23.6 EMPTY SELLA SYNDROME: ICD-10-CM

## 2020-05-26 DIAGNOSIS — F41.9 ANXIETY: ICD-10-CM

## 2020-05-26 LAB
ALBUMIN SERPL BCP-MCNC: 3.8 G/DL (ref 3.5–5.2)
ALP SERPL-CCNC: 69 U/L (ref 55–135)
ALT SERPL W/O P-5'-P-CCNC: 18 U/L (ref 10–44)
ANION GAP SERPL CALC-SCNC: 10 MMOL/L (ref 8–16)
AST SERPL-CCNC: 20 U/L (ref 10–40)
BASOPHILS # BLD AUTO: 0.09 K/UL (ref 0–0.2)
BASOPHILS NFR BLD: 0.9 % (ref 0–1.9)
BILIRUB SERPL-MCNC: 0.2 MG/DL (ref 0.1–1)
BUN SERPL-MCNC: 22 MG/DL (ref 8–23)
CALCIUM SERPL-MCNC: 9.5 MG/DL (ref 8.7–10.5)
CHLORIDE SERPL-SCNC: 112 MMOL/L (ref 95–110)
CO2 SERPL-SCNC: 22 MMOL/L (ref 23–29)
CREAT SERPL-MCNC: 1.1 MG/DL (ref 0.5–1.4)
DIFFERENTIAL METHOD: ABNORMAL
EOSINOPHIL # BLD AUTO: 0.3 K/UL (ref 0–0.5)
EOSINOPHIL NFR BLD: 3.1 % (ref 0–8)
ERYTHROCYTE [DISTWIDTH] IN BLOOD BY AUTOMATED COUNT: 15.3 % (ref 11.5–14.5)
EST. GFR  (AFRICAN AMERICAN): >60 ML/MIN/1.73 M^2
EST. GFR  (NON AFRICAN AMERICAN): 53.6 ML/MIN/1.73 M^2
FERRITIN SERPL-MCNC: 30 NG/ML (ref 20–300)
GLUCOSE SERPL-MCNC: 111 MG/DL (ref 70–110)
HCT VFR BLD AUTO: 48.1 % (ref 37–48.5)
HGB BLD-MCNC: 14.2 G/DL (ref 12–16)
IMM GRANULOCYTES # BLD AUTO: 0.07 K/UL (ref 0–0.04)
IMM GRANULOCYTES NFR BLD AUTO: 0.7 % (ref 0–0.5)
IRON SERPL-MCNC: 51 UG/DL (ref 30–160)
LYMPHOCYTES # BLD AUTO: 3.2 K/UL (ref 1–4.8)
LYMPHOCYTES NFR BLD: 33.3 % (ref 18–48)
MCH RBC QN AUTO: 25.3 PG (ref 27–31)
MCHC RBC AUTO-ENTMCNC: 29.5 G/DL (ref 32–36)
MCV RBC AUTO: 86 FL (ref 82–98)
MONOCYTES # BLD AUTO: 0.8 K/UL (ref 0.3–1)
MONOCYTES NFR BLD: 8 % (ref 4–15)
NEUTROPHILS # BLD AUTO: 5.2 K/UL (ref 1.8–7.7)
NEUTROPHILS NFR BLD: 54 % (ref 38–73)
NRBC BLD-RTO: 0 /100 WBC
PLATELET # BLD AUTO: 166 K/UL (ref 150–350)
PMV BLD AUTO: 12.3 FL (ref 9.2–12.9)
POTASSIUM SERPL-SCNC: 3.8 MMOL/L (ref 3.5–5.1)
PROT SERPL-MCNC: 7.3 G/DL (ref 6–8.4)
RBC # BLD AUTO: 5.62 M/UL (ref 4–5.4)
SARS-COV-2 IGG SERPLBLD QL IA.RAPID: NEGATIVE
SATURATED IRON: 13 % (ref 20–50)
SODIUM SERPL-SCNC: 144 MMOL/L (ref 136–145)
TOTAL IRON BINDING CAPACITY: 400 UG/DL (ref 250–450)
TRANSFERRIN SERPL-MCNC: 270 MG/DL (ref 200–375)
WBC # BLD AUTO: 9.63 K/UL (ref 3.9–12.7)

## 2020-05-26 PROCEDURE — 85025 COMPLETE CBC W/AUTO DIFF WBC: CPT

## 2020-05-26 PROCEDURE — 99214 PR OFFICE/OUTPT VISIT, EST, LEVL IV, 30-39 MIN: ICD-10-PCS | Mod: S$PBB,,, | Performed by: INTERNAL MEDICINE

## 2020-05-26 PROCEDURE — 99999 PR PBB SHADOW E&M-EST. PATIENT-LVL IV: CPT | Mod: PBBFAC,,, | Performed by: INTERNAL MEDICINE

## 2020-05-26 PROCEDURE — 99214 OFFICE O/P EST MOD 30 MIN: CPT | Mod: S$PBB,,, | Performed by: INTERNAL MEDICINE

## 2020-05-26 PROCEDURE — 82728 ASSAY OF FERRITIN: CPT

## 2020-05-26 PROCEDURE — 80053 COMPREHEN METABOLIC PANEL: CPT

## 2020-05-26 PROCEDURE — 83540 ASSAY OF IRON: CPT

## 2020-05-26 PROCEDURE — 99999 PR PBB SHADOW E&M-EST. PATIENT-LVL IV: ICD-10-PCS | Mod: PBBFAC,,, | Performed by: INTERNAL MEDICINE

## 2020-05-26 PROCEDURE — 99214 OFFICE O/P EST MOD 30 MIN: CPT | Mod: PBBFAC,PO | Performed by: INTERNAL MEDICINE

## 2020-05-26 PROCEDURE — 36415 COLL VENOUS BLD VENIPUNCTURE: CPT | Mod: PO

## 2020-05-26 PROCEDURE — 86769 SARS-COV-2 COVID-19 ANTIBODY: CPT

## 2020-05-26 RX ORDER — POTASSIUM CHLORIDE 750 MG/1
10 TABLET, EXTENDED RELEASE ORAL DAILY
COMMUNITY
Start: 2020-05-06 | End: 2021-04-17 | Stop reason: SDUPTHER

## 2020-05-26 RX ORDER — TRAZODONE HYDROCHLORIDE 50 MG/1
50 TABLET ORAL NIGHTLY
Qty: 30 TABLET | Refills: 0 | Status: SHIPPED | OUTPATIENT
Start: 2020-05-26 | End: 2020-06-20

## 2020-05-26 NOTE — PROGRESS NOTES
Subjective:       Patient ID: Joy Singer is a 64 y.o. female who presents for Anxiety; Insomnia; and Hypertension      Anxiety   Presents for initial visit. Onset was 1 to 6 months ago. The problem has been gradually worsening. Symptoms include excessive worry, hyperventilation, insomnia, irritability, muscle tension, nervous/anxious behavior, panic and restlessness. Patient reports no chest pain, confusion, depressed mood, dizziness, palpitations or shortness of breath. Symptoms occur most days. The severity of symptoms is moderate. The symptoms are aggravated by family issues (concerns are related to the viral pandemic). The quality of sleep is poor. Nighttime awakenings: early a.m. for rest of night, several.     Risk factors include a major life event. Her past medical history is significant for anxiety/panic attacks (reports h/o mild anxiety). There is no history of hyperthyroidism. Past treatments include non-benzodiazephine anxiolytics (tried hydroxyzine). The treatment provided no relief.   Hypertension   This is a chronic problem. The problem is unchanged. The problem is controlled. Associated symptoms include anxiety and peripheral edema. Pertinent negatives include no chest pain, headaches, neck pain, palpitations or shortness of breath. There are no associated agents to hypertension. Risk factors for coronary artery disease include stress, sedentary lifestyle, obesity and post-menopausal state. Past treatments include diuretics and angiotensin blockers. The current treatment provides moderate improvement. Compliance problems include exercise and diet.  There is no history of a hypertension causing med.      131/85 129/93 132/87 129/83 121/73  P 61-72    Snacks- cookies, chips, macadamia nut cookies and eats due to boredom sometimes.      Review of Systems   Constitutional: Positive for activity change, irritability and unexpected weight change. Negative for chills and fever.   HENT: Negative for  congestion, hearing loss, rhinorrhea, sinus pressure and trouble swallowing.    Eyes: Negative for discharge and visual disturbance.   Respiratory: Negative for cough, chest tightness, shortness of breath and wheezing.    Cardiovascular: Negative for chest pain and palpitations.   Gastrointestinal: Positive for constipation. Negative for abdominal pain, blood in stool, diarrhea and vomiting.   Endocrine: Negative for polydipsia and polyuria.   Genitourinary: Negative for difficulty urinating, dysuria, hematuria and menstrual problem.   Musculoskeletal: Positive for arthralgias (knee pain) and joint swelling. Negative for neck pain.   Neurological: Negative for dizziness, weakness and headaches.   Psychiatric/Behavioral: Positive for sleep disturbance (she is unable to sleep due to anxiety about pandemic, tried benadryl 1-2 months ago, ZZquil). Negative for confusion and dysphoric mood. The patient is nervous/anxious and has insomnia.          Answers for HPI/ROS submitted by the patient on 5/25/2020   activity change: Yes  unexpected weight change: Yes  neck pain: No  hearing loss: No  rhinorrhea: No  trouble swallowing: No  eye discharge: No  visual disturbance: No  chest tightness: No  wheezing: No  chest pain: No  palpitations: No  blood in stool: No  constipation: Yes  vomiting: No  diarrhea: No  polydipsia: No  polyuria: No  difficulty urinating: No  hematuria: No  menstrual problem: No  dysuria: No  joint swelling: Yes  arthralgias: No  headaches: No  weakness: No  confusion: No  dysphoric mood: No      Objective:      Physical Exam   Constitutional: She is oriented to person, place, and time. Vital signs are normal. She appears well-developed and well-nourished. No distress.   HENT:   Head: Normocephalic and atraumatic.   Right Ear: Hearing and external ear normal.   Left Ear: Hearing and external ear normal.   Nose: Nose normal.   Mouth/Throat: Uvula is midline and mucous membranes are normal.   Eyes:  Conjunctivae and lids are normal.   Cardiovascular: Normal rate, regular rhythm, normal heart sounds and intact distal pulses.   No murmur heard.  Pulmonary/Chest: Effort normal and breath sounds normal. She has no wheezes.   Abdominal: Soft. Bowel sounds are normal. She exhibits no distension. There is no tenderness.   Musculoskeletal: Normal range of motion. She exhibits no edema.   Neurological: She is alert and oriented to person, place, and time.   Skin: Skin is warm, dry and intact. No rash noted. She is not diaphoretic.   Psychiatric: Her speech is normal. Her mood appears anxious.   Vitals reviewed.      Assessment/Plan:         1. Essential hypertension  - controlled, continue chlorthalidone, irbesartan  - Comprehensive metabolic panel; Future    2. Insomnia, unspecified type  - trial of trazodone  - traZODone (DESYREL) 50 MG tablet; Take 1 tablet (50 mg total) by mouth every evening.  Dispense: 30 tablet; Refill: 0    3. Anxiety  - she will avoid watching news about the viral pandemic and will find new ways to interact safely with her family and grandchildren  - monitor closely    4. Nutritional anemia, unspecified   - CBC auto differential; Future  - Iron and TIBC; Future  - Ferritin; Future    5. Morbid obesity with body mass index (BMI) of 60.0 to 69.9 in adult  - patient will need to lose 100 lbs before she can consider knee replacement, she is willing to work on weight loss  - will discuss diet and exercise options at next appointment    6. Empty sella syndrome  - seen on previous imaging, incidental    7. Advice Given About Covid-19 Virus Infection  - COVID-19 (SARS CoV-2) IgG Antibody; Future    RTC in 2 months or sooner if needed    Faiza Kuhn MD

## 2020-05-28 ENCOUNTER — TELEPHONE (OUTPATIENT)
Dept: INTERNAL MEDICINE | Facility: CLINIC | Age: 65
End: 2020-05-28

## 2020-05-28 NOTE — TELEPHONE ENCOUNTER
Iron levels suggest that you may have inadequate iron levels. Will repeat blood counts at your next appointment. OK to take a MVI with Fe daily.    Potassium level and kidney function levels are normal.     Testing is negative for corona virus antibodies.    Has she tried trazodone yet? Does it help?

## 2020-05-28 NOTE — TELEPHONE ENCOUNTER
Pt notified and verbalized understanding.    She said she is taking MVI with Fe daily.  Centrum regular, includes 18mg (100%) iron.    The trazodone is helping.    She wants to know how you're sending the information about obesity?  Through her portal or mail?

## 2020-06-19 ENCOUNTER — PES CALL (OUTPATIENT)
Dept: ADMINISTRATIVE | Facility: CLINIC | Age: 65
End: 2020-06-19

## 2020-07-02 DIAGNOSIS — I10 ESSENTIAL HYPERTENSION: ICD-10-CM

## 2020-07-02 RX ORDER — IRBESARTAN 75 MG/1
75 TABLET ORAL NIGHTLY
Qty: 90 TABLET | Refills: 3 | Status: SHIPPED | OUTPATIENT
Start: 2020-07-02 | End: 2021-01-05

## 2020-07-06 ENCOUNTER — HOSPITAL ENCOUNTER (OUTPATIENT)
Dept: RADIOLOGY | Facility: HOSPITAL | Age: 65
Discharge: HOME | End: 2020-07-06
Attending: SURGERY
Payer: MEDICARE

## 2020-07-06 DIAGNOSIS — Z12.39 BREAST CANCER SCREENING, HIGH RISK PATIENT: ICD-10-CM

## 2020-07-06 PROCEDURE — 76641 ULTRASOUND BREAST COMPLETE: CPT | Mod: TC,50,PO

## 2020-07-07 ENCOUNTER — TELEPHONE (OUTPATIENT)
Dept: SURGERY | Facility: CLINIC | Age: 65
End: 2020-07-07

## 2020-07-10 ENCOUNTER — PATIENT OUTREACH (OUTPATIENT)
Dept: ADMINISTRATIVE | Facility: HOSPITAL | Age: 65
End: 2020-07-10

## 2020-07-10 NOTE — LETTER
AUTHORIZATION FOR RELEASE OF   CONFIDENTIAL INFORMATION    Dear Dr. Wong,    We are seeing Joy Singer, date of birth 1955, in the clinic at [facility]. Faiza Kuhn MD is the patient's PCP. Joy Singer has an outstanding lab/procedure at the time we reviewed her chart. In order to help keep her health information updated, she has authorized us to request the following medical record(s):        (  )  MAMMOGRAM                                      ( x )  COLONOSCOPY      (  )  PAP SMEAR                                          (  )  OUTSIDE LAB RESULTS     (  )  DEXA SCAN                                          (  )  EYE EXAM            (  )  FOOT EXAM                                          (  )  ENTIRE RECORD     (  )  OUTSIDE IMMUNIZATIONS                 (  )  _______________         Please fax records to [facility], Linnea T Perkins, MD, [phone]     If you have any questions, please contact ENZO Rivers at [phone]          Patient Name: Joy Singer  : 1955  Patient Phone #: [phone]

## 2020-07-13 ENCOUNTER — TELEPHONE (OUTPATIENT)
Dept: RADIOLOGY | Facility: HOSPITAL | Age: 65
End: 2020-07-13

## 2020-07-13 NOTE — TELEPHONE ENCOUNTER
Had patient identify herself with 2 patient identifiers. Let her know I was calling on behalf of Dr Winn to get her scheduled for an Ultrasound. Scheduled her 7/24 @1pm as her  has an appointment that morning for 920 or 940. Advised her to wear a 2 piece outfit, sans perfume deodorant or powder.     After I disconnected, I realised I did not tell her about charges being reversed for prior US visit. I called patient back & advised her of this and that we would check her in when she arrives on the 7/24.     TA

## 2020-07-16 ENCOUNTER — PATIENT OUTREACH (OUTPATIENT)
Dept: ADMINISTRATIVE | Facility: HOSPITAL | Age: 65
End: 2020-07-16

## 2020-07-17 DIAGNOSIS — Z71.89 COMPLEX CARE COORDINATION: ICD-10-CM

## 2020-07-24 ENCOUNTER — HOSPITAL ENCOUNTER (OUTPATIENT)
Dept: RADIOLOGY | Facility: HOSPITAL | Age: 65
Discharge: HOME | End: 2020-07-24
Attending: SURGERY
Payer: MEDICARE

## 2020-07-24 DIAGNOSIS — Z12.39 BREAST CANCER SCREENING, HIGH RISK PATIENT: ICD-10-CM

## 2020-07-24 PROCEDURE — 76641 US BREAST BILATERAL COMPLETE: ICD-10-PCS | Mod: 26,RT,, | Performed by: RADIOLOGY

## 2020-07-24 PROCEDURE — 76641 ULTRASOUND BREAST COMPLETE: CPT | Mod: TC,50,PO

## 2020-07-24 PROCEDURE — 76641 ULTRASOUND BREAST COMPLETE: CPT | Mod: 26,RT,, | Performed by: RADIOLOGY

## 2020-07-27 ENCOUNTER — OFFICE VISIT (OUTPATIENT)
Dept: INTERNAL MEDICINE | Facility: CLINIC | Age: 65
End: 2020-07-27
Payer: MEDICARE

## 2020-07-27 DIAGNOSIS — E66.01 MORBID OBESITY WITH BODY MASS INDEX (BMI) OF 60.0 TO 69.9 IN ADULT: ICD-10-CM

## 2020-07-27 DIAGNOSIS — I10 ESSENTIAL HYPERTENSION: Primary | ICD-10-CM

## 2020-07-27 PROCEDURE — 99213 OFFICE O/P EST LOW 20 MIN: CPT | Mod: 95,,, | Performed by: INTERNAL MEDICINE

## 2020-07-27 PROCEDURE — 99213 PR OFFICE/OUTPT VISIT, EST, LEVL III, 20-29 MIN: ICD-10-PCS | Mod: 95,,, | Performed by: INTERNAL MEDICINE

## 2020-07-27 NOTE — PROGRESS NOTES
Primary Care Telemedicine Note    The patient location is:  Home   The chief complaint leading to consultation is: HTN  Total time spent with patient: 30 minutes    110/66 ok    Visit type: Virtual visit with synchronous audio only and video  Each patient to whom he or she provides medical services by telemedicine is:  (1) informed of the relationship between the physician and patient and the respective role of any other health care provider with respect to management of the patient; and (2) notified that he or she may decline to receive medical services by telemedicine and may withdraw from such care at any time.    Subjective:      Patient ID: Joy Singer is a 65 y.o. female.    Chief Complaint: Hypertension    Hypertension  This is a chronic problem. The problem is unchanged. The problem is controlled. Pertinent negatives include no chest pain, headaches, palpitations or shortness of breath. There are no associated agents to hypertension. Risk factors for coronary artery disease include obesity, post-menopausal state, sedentary lifestyle and family history. Past treatments include angiotensin blockers and diuretics. The current treatment provides moderate improvement. Compliance problems include exercise and diet.  There is no history of heart failure. There is no history of a hypertension causing med.      - Snacks- fruit, grapes, watermelon, pineapple, baked chips, homemade trail mix (pretzels, cheez its, M&M, cheerios, peanuts)  - last night, dinner was mac& cheese, bbq, spaghetti, roast, baked pork chops, potatoes, carrots, corn  -  cooks  - weight gain triggers include broken arm and surgery  - Weight watchers and could resume  Nutri-System   - minimal physical activity      Review of Systems   Constitutional: Negative for chills, fatigue and fever.   HENT: Negative for congestion, ear pain and sinus pain.    Respiratory: Negative for cough, chest tightness and shortness of breath.     Cardiovascular: Negative for chest pain and palpitations.   Gastrointestinal: Negative for abdominal pain, constipation, diarrhea and nausea.   Musculoskeletal: Positive for arthralgias (right knee pain). Negative for myalgias.   Neurological: Negative for dizziness, numbness and headaches.           Answers for HPI/ROS submitted by the patient on 7/27/2020   Hypertension  Chronicity: chronic  Onset: more than 1 year ago  Progression since onset: waxing and waning  Condition status: controlled  anxiety: Yes  peripheral edema: Yes  Agents associated with hypertension: no associated agents  CAD risks: dyslipidemia, family history, obesity, post-menopausal state, sedentary lifestyle, stress  Compliance problems: diet, exercise  Past treatments: diuretics, lifestyle changes  Improvement on treatment: moderate      Objective:      Physical Exam  Constitutional:       Appearance: Normal appearance. She is not ill-appearing or toxic-appearing.   HENT:      Head: Normocephalic and atraumatic.      Right Ear: Hearing normal.      Left Ear: Hearing normal.   Eyes:      General: Lids are normal.   Pulmonary:      Effort: Pulmonary effort is normal. No tachypnea or bradypnea.   Neurological:      Mental Status: She is alert.   Psychiatric:         Attention and Perception: Attention normal.         Mood and Affect: Mood normal.         Assessment:       1. Essential hypertension    2. Morbid obesity with body mass index (BMI) of 60.0 to 69.9 in adult        Plan:       1. Essential hypertension  - per home measurements, BP is controlled  - continue irbesartan, chlorthalidone    2. Morbid obesity with body mass index (BMI) of 60.0 to 69.9 in adult  - resume Weight Watchers program, create a diet plan    RTC in September for annual exam or sooner if needed    Faiza Kuhn MD

## 2020-08-05 ENCOUNTER — PATIENT MESSAGE (OUTPATIENT)
Dept: INTERNAL MEDICINE | Facility: CLINIC | Age: 65
End: 2020-08-05

## 2020-08-05 ENCOUNTER — TELEPHONE (OUTPATIENT)
Dept: INTERNAL MEDICINE | Facility: CLINIC | Age: 65
End: 2020-08-05

## 2020-08-05 NOTE — TELEPHONE ENCOUNTER
----- Message from Aury Tobar sent at 8/5/2020  4:26 PM CDT -----  Contact: self [phone]  Pt states she sent a message through the portal and wanted me to send a message to make sure the dr responds to her message. Please advise via portal

## 2020-08-05 NOTE — TELEPHONE ENCOUNTER
----- Message from Aury Tobar sent at 8/5/2020  4:26 PM CDT -----  Contact: self [phone]  Pt states she sent a message through the portal and wanted me to send a message to make sure the dr responds to her message. Please advise via portal

## 2020-08-06 NOTE — TELEPHONE ENCOUNTER
She should look at the picture and let me know if there are multiple little blisters on her wrist. If not, can use a topical steroid.    If she needs appointment, 4pm is ok but not on 8/10/20 or 8/11/20.

## 2020-08-07 NOTE — TELEPHONE ENCOUNTER
Spoke to pt.  She said she has had little electric shocks in different parts of her body.  Lip and wrist.  No blisters, not breaking out.    She said she believes she has the start of shingles and has it under control by taking her 's valtrex.    She still wants her own Rx of Valtrex.    Pt has shingles vaccines about two years ago.

## 2020-09-02 ENCOUNTER — TELEPHONE (OUTPATIENT)
Dept: INTERNAL MEDICINE | Facility: CLINIC | Age: 65
End: 2020-09-02

## 2020-09-29 ENCOUNTER — PATIENT MESSAGE (OUTPATIENT)
Dept: OTHER | Facility: OTHER | Age: 65
End: 2020-09-29

## 2020-10-02 ENCOUNTER — LAB VISIT (OUTPATIENT)
Dept: LAB | Facility: HOSPITAL | Age: 65
End: 2020-10-02
Attending: INTERNAL MEDICINE
Payer: MEDICARE

## 2020-10-02 DIAGNOSIS — I10 ESSENTIAL HYPERTENSION: ICD-10-CM

## 2020-10-02 LAB
ALBUMIN SERPL BCP-MCNC: 3.3 G/DL (ref 3.5–5.2)
ALP SERPL-CCNC: 65 U/L (ref 55–135)
ALT SERPL W/O P-5'-P-CCNC: 15 U/L (ref 10–44)
ANION GAP SERPL CALC-SCNC: 12 MMOL/L (ref 8–16)
AST SERPL-CCNC: 17 U/L (ref 10–40)
BILIRUB SERPL-MCNC: 0.3 MG/DL (ref 0.1–1)
BUN SERPL-MCNC: 21 MG/DL (ref 8–23)
CALCIUM SERPL-MCNC: 8.8 MG/DL (ref 8.7–10.5)
CHLORIDE SERPL-SCNC: 109 MMOL/L (ref 95–110)
CO2 SERPL-SCNC: 25 MMOL/L (ref 23–29)
CREAT SERPL-MCNC: 1 MG/DL (ref 0.5–1.4)
EST. GFR  (AFRICAN AMERICAN): >60 ML/MIN/1.73 M^2
EST. GFR  (NON AFRICAN AMERICAN): 59.7 ML/MIN/1.73 M^2
GLUCOSE SERPL-MCNC: 144 MG/DL (ref 70–110)
POTASSIUM SERPL-SCNC: 3.6 MMOL/L (ref 3.5–5.1)
PROT SERPL-MCNC: 6.6 G/DL (ref 6–8.4)
SODIUM SERPL-SCNC: 146 MMOL/L (ref 136–145)

## 2020-10-02 PROCEDURE — 80053 COMPREHEN METABOLIC PANEL: CPT

## 2020-10-02 PROCEDURE — 36415 COLL VENOUS BLD VENIPUNCTURE: CPT | Mod: PO

## 2020-10-05 ENCOUNTER — PATIENT OUTREACH (OUTPATIENT)
Dept: ADMINISTRATIVE | Facility: OTHER | Age: 65
End: 2020-10-05

## 2020-10-06 ENCOUNTER — OFFICE VISIT (OUTPATIENT)
Dept: CARDIOLOGY | Facility: CLINIC | Age: 65
End: 2020-10-06
Payer: MEDICARE

## 2020-10-06 ENCOUNTER — PATIENT MESSAGE (OUTPATIENT)
Dept: ADMINISTRATIVE | Facility: OTHER | Age: 65
End: 2020-10-06

## 2020-10-06 VITALS
SYSTOLIC BLOOD PRESSURE: 123 MMHG | HEIGHT: 66 IN | BODY MASS INDEX: 47.09 KG/M2 | DIASTOLIC BLOOD PRESSURE: 87 MMHG | HEART RATE: 66 BPM | WEIGHT: 293 LBS

## 2020-10-06 DIAGNOSIS — G47.33 OSA (OBSTRUCTIVE SLEEP APNEA): ICD-10-CM

## 2020-10-06 DIAGNOSIS — E78.00 PURE HYPERCHOLESTEROLEMIA: ICD-10-CM

## 2020-10-06 DIAGNOSIS — I10 ESSENTIAL HYPERTENSION: Primary | ICD-10-CM

## 2020-10-06 DIAGNOSIS — E66.01 MORBID OBESITY WITH BMI OF 60.0-69.9, ADULT: ICD-10-CM

## 2020-10-06 PROCEDURE — 99214 PR OFFICE/OUTPT VISIT, EST, LEVL IV, 30-39 MIN: ICD-10-PCS | Mod: 95,,, | Performed by: INTERNAL MEDICINE

## 2020-10-06 PROCEDURE — 99214 OFFICE O/P EST MOD 30 MIN: CPT | Mod: 95,,, | Performed by: INTERNAL MEDICINE

## 2020-10-06 RX ORDER — ASPIRIN 325 MG
1 TABLET ORAL
COMMUNITY
Start: 2020-03-06 | End: 2024-01-04

## 2020-10-06 RX ORDER — MULTIVITAMIN/IRON/FOLIC ACID 18MG-0.4MG
1 TABLET ORAL DAILY
COMMUNITY
Start: 2018-10-06

## 2020-10-06 NOTE — PROGRESS NOTES
The patient location is: Home, in [place].  The chief complaint leading to consultation is: hypertension   Visit type: audiovisual  Total time spent with patient: 21 minutes.  Each patient to whom he or she provides medical services by telemedicine is:  (1) informed of the relationship between the physician and patient and the respective role of any other health care provider with respect to management of the patient; and (2) notified that he or she may decline to receive medical services by telemedicine and may withdraw from such care at any time.    Subjective:     Problem List:  HTN  Sleep apnea  Pseudotumor cerebri  Multiple colonic polyp  BMI ~60    Dermatology- Dr. Zavaleta  GI- Dr. Wong  Orthopedics- Dr. Armendariz  [facility]  Neuro-ophthalmology- Dr. West ([facility])  Neurology- Dr. Vincent    HPI:   Joy Singer is a 65 y.o. female who presents for follow-up of Hypertension  On HCTZ 12.5 mg once a day instead of 25 mg because the higher dose made her urinate very frequently. Also taking irbesartan 75 mg a day.  She used to take amlodipine previously.  She has mild lower extremity swelling at the end of the day that is not present in the mornings.  SBPs are mostly in the 120s mm Hg. Although she does admit to some in the 140s.  Using CPAP regularly. Sleeping better and has less nocturia since Dr. Kuhn prescribed trazodone.  She reports chest discomfort described as a twinge that occurs in the absence of exertion and lasts < 30 sec.      Review of Systems   Constitution: Negative.   Cardiovascular: Positive for chest pain and leg swelling. Negative for dyspnea on exertion and palpitations.   Respiratory: Negative.    Gastrointestinal: Positive for constipation.   Genitourinary: Positive for frequency, nocturia (improved) and urgency.   Allergic/Immunologic: Negative.        Review of patient's allergies indicates:   Allergen Reactions    Percocet [oxycodone-acetaminophen] Itching     Venom-honey bee     Unable to assess Rash     Insect Bites        Current Outpatient Medications   Medication Sig    acetaZOLAMIDE (DIAMOX) 250 MG tablet Take 250 mg by mouth 2 (two) times daily.     ascorbic acid (VITAMIN C WITH MARCOS HIPS) 500 mg TbSR Take by mouth once daily.    aspirin (HIRAL ASPIRIN) 325 MG tablet Take 1 tablet by mouth every 6 to 8 hours as needed.    calcium carbonate-vitamin D3 600 mg(1,500mg) -500 unit Cap Calcium 600 with Vitamin D3 600 mg (1,500 mg)-500 unit capsule   Take 1 capsule twice a day by oral route.    carisoprodol (SOMA) 350 MG tablet Take 350 mg by mouth continuous prn.      chlorthalidone (HYGROTEN) 25 MG Tab Take 1 tablet (25 mg total) by mouth once daily. (Patient taking differently: Take 12.5 mg by mouth once daily. )    diphenhydrAMINE (BENADRYL) 25 mg capsule Take 25 mg by mouth every 6 (six) hours as needed for Itching.    docusate sodium (COLACE) 100 MG capsule Colace 100 mg capsule   Take 2 capsule twice a day by oral route.    folic acid (FOLVITE) 400 MCG tablet Take 400 mcg by mouth once daily.    hydrocodone-acetaminophen 7.5-325mg (NORCO) 7.5-325 mg per tablet TK 1 T PO Q 6 TO 8 H PRN    irbesartan (AVAPRO) 75 MG tablet Take 1 tablet (75 mg total) by mouth every evening.    ketoconazole (EXTINA) 2 % Foam Extina 2 % topical foam as needed    multivitamin-iron-folic acid (CENTRUM) Tab Take 1 tablet by mouth once daily.    phenylephrine-guaifenesin (DECONEX IR)  mg Tab Take 1 tablet by mouth as needed.    polyethylene glycol (GLYCOLAX) 17 gram/dose powder polyethylene glycol 3350 17 gram/dose oral powder as needed    potassium chloride SA (K-DUR,KLOR-CON) 10 MEQ tablet Take 10 mEq by mouth once daily.     pravastatin (PRAVACHOL) 40 MG tablet TAKE 1 TABLET DAILY    traZODone (DESYREL) 50 MG tablet TAKE 1 TABLET(50 MG) BY MOUTH EVERY EVENING    vitamin E 1000 UNIT capsule Take 1,000 Units by mouth once daily.         Social history:  Joy JARRETT  "Enmanuel  reports that she has never smoked. She has never used smokeless tobacco. She reports current alcohol use. She reports that she does not use drugs.      Objective:   /87   Pulse 66   Ht 5' 6" (1.676 m)   Wt (!) 176 kg (388 lb)   BMI 62.62 kg/m²      Physical Exam        Lab Results   Component Value Date    CHOL 182 12/04/2019    HDL 45 12/04/2019    LDLCALC 107.6 12/04/2019    TRIG 147 12/04/2019    CHOLHDL 24.7 12/04/2019     Lab Results   Component Value Date     (H) 10/02/2020    CREATININE 1.0 10/02/2020    BUN 21 10/02/2020     (H) 10/02/2020    K 3.6 10/02/2020     10/02/2020    CO2 25 10/02/2020     Lab Results   Component Value Date    ALT 15 10/02/2020    AST 17 10/02/2020    ALKPHOS 65 10/02/2020    BILITOT 0.3 10/02/2020           Assessment and Plan:       ICD-10-CM ICD-9-CM   1. Essential hypertension  I10 401.9   2. BMI > 60  E66.01 278.01    Z68.44 V85.44   3. LIZETH (obstructive sleep apnea)  G47.33 327.23   4. Pure hypercholesterolemia  E78.00 272.0        Mild hypernatremia.  Recommend that she continue the current 12.5 mg dose of hydrochlorothiazide instead of increasing to 12.5 mg b.i.d unless of course her blood pressure or leg swelling worsen.  Reviewed BMI - unchanged.  Recommended exercise.  Continue CPAP.  Continue pravastatin.      Orders placed during this encounter:     Essential hypertension  -     Comprehensive Metabolic Panel; Future; Expected date: 10/06/2021    BMI > 60    LIZETH (obstructive sleep apnea)    Pure hypercholesterolemia  -     Lipid Panel; Future; Expected date: 10/07/2021         Follow up in about 1 year (around 10/6/2021), or if symptoms worsen or fail to improve.                  "

## 2020-10-06 NOTE — PATIENT INSTRUCTIONS
I am not an infectious disease expert, so my  knowledge of COVID-19 is limited, but after talking to some real experts, I recommend the following if you get exposed or contract the novel corona virus infection:    Claritin 10 mg once a day (or most H1 blockers)  Pepcid 20 mg once a day (or another H2 blocker)  L-Arginine (available in the vitamin section of your pharmacy) 500 mg twice a day (a nitric acid donor)  A blood thinner such as aspirin. Hospitalized patients get a stronger blood thinner.  You do not need a prescription for the above meds. If you are already taking a blood thinner such as Coumadin, Eliquis or Xarelto, you do not need to add an aspirin.  These are not anti-viral medications, ie they do not kill the virus the way antibiotics kill bacteria. Instead they interfere with the ability of the virus to enter respiratory cells.   Steroids have been shown to be beneficial and have been prescribed for both hospitalized and non hospitalized patients.  Hydroxychloroquine is extremely controversial and most physicians think there is no benefit, but I think there is enough data that it should be given consideration if you common contact with a person who is COVID+ or you test +ve for it.  You should discuss this with your doctor.  All of this information is subject to change based on new scientific evidence.    Take care, practice social distancing and wear a mask. Insist that other people wear a mask and limit close interaction with other people so that you are more than 6 feet apart and spend less than 15 minutes in duration.

## 2020-10-14 ENCOUNTER — PATIENT MESSAGE (OUTPATIENT)
Dept: CARDIOLOGY | Facility: CLINIC | Age: 65
End: 2020-10-14

## 2020-10-16 NOTE — TELEPHONE ENCOUNTER
Suggest she stop the pm dose of the diuretic and hold the irbesartan for a while. She should check BP daily or as often as possible for a few weeks and let us know. I'll decide then whether or not to resume irbesartan or take 1/2 a tab a day.

## 2020-11-03 ENCOUNTER — PES CALL (OUTPATIENT)
Dept: ADMINISTRATIVE | Facility: CLINIC | Age: 65
End: 2020-11-03

## 2020-11-29 DIAGNOSIS — G47.00 INSOMNIA, UNSPECIFIED TYPE: ICD-10-CM

## 2020-11-30 RX ORDER — TRAZODONE HYDROCHLORIDE 50 MG/1
TABLET ORAL
Qty: 30 TABLET | Refills: 2 | Status: SHIPPED | OUTPATIENT
Start: 2020-11-30 | End: 2021-02-02 | Stop reason: SDUPTHER

## 2020-12-02 ENCOUNTER — TELEPHONE (OUTPATIENT)
Dept: INTERNAL MEDICINE | Facility: CLINIC | Age: 65
End: 2020-12-02

## 2020-12-02 DIAGNOSIS — Z00.00 ANNUAL PHYSICAL EXAM: Primary | ICD-10-CM

## 2020-12-02 DIAGNOSIS — I10 ESSENTIAL HYPERTENSION: ICD-10-CM

## 2020-12-02 DIAGNOSIS — E55.9 VITAMIN D INSUFFICIENCY: ICD-10-CM

## 2020-12-02 DIAGNOSIS — R73.9 ELEVATED BLOOD SUGAR: ICD-10-CM

## 2020-12-02 NOTE — TELEPHONE ENCOUNTER
----- Message from Radha Gupta sent at 12/2/2020 11:18 AM CST -----  Doctor appointment and lab have been scheduled.  Please link lab orders to the lab appointment.  Date of doctor appointment:  1/5  Date of lab appointment:  12/29  Physical or F/U: Physical  Comments:

## 2020-12-03 ENCOUNTER — TELEPHONE (OUTPATIENT)
Dept: INTERNAL MEDICINE | Facility: CLINIC | Age: 65
End: 2020-12-03

## 2020-12-03 RX ORDER — HYDROCORTISONE 25 MG/G
CREAM TOPICAL 2 TIMES DAILY
Qty: 28 G | Refills: 1 | Status: SHIPPED | OUTPATIENT
Start: 2020-12-03

## 2020-12-03 NOTE — TELEPHONE ENCOUNTER
----- Message from Ronna Lei sent at 12/3/2020  9:24 AM CST -----  Contact: Joy [phone]  Would like to get medical advice.    Comments:  Calling to speak to nurse regarding the pt having hemorrhoid issues. Pt is requesting a call back would like a virtual with with provider. Declined seeing a different provider.

## 2020-12-03 NOTE — TELEPHONE ENCOUNTER
Pt scheduled appt next Fri 12/11    Pt states she has no bleeding.  Has hx of hemorrhoids.  More itching than usual, the area around annus is raw and inflamed.  She has been using witchhazel and Prep H w/lidocaine.  Also tried desitin and cortaid creams.  Not healing.

## 2020-12-04 RX ORDER — CLOTRIMAZOLE AND BETAMETHASONE DIPROPIONATE 10; .64 MG/G; MG/G
CREAM TOPICAL 2 TIMES DAILY
Qty: 15 G | Refills: 1 | Status: SHIPPED | OUTPATIENT
Start: 2020-12-04 | End: 2024-01-04

## 2020-12-04 NOTE — TELEPHONE ENCOUNTER
Pt notified of new Rx    She said she thinks she may have a yeast infection.  The redness starts at her anus and spreads up the buttocks on both sides.    She said it's raw with an unbearable itch.    Please advise.

## 2020-12-07 NOTE — TELEPHONE ENCOUNTER
Message unread.    Spoke to pt.  Notified and verbalized understanding of Dr Kuhn' instructions.    She picked it up yesterday, but hasn't started using yet.  Instructed to call us if symptoms don't improve.

## 2020-12-27 ENCOUNTER — PATIENT MESSAGE (OUTPATIENT)
Dept: SURGERY | Facility: CLINIC | Age: 65
End: 2020-12-27

## 2020-12-31 ENCOUNTER — LAB VISIT (OUTPATIENT)
Dept: LAB | Facility: HOSPITAL | Age: 65
End: 2020-12-31
Attending: INTERNAL MEDICINE
Payer: MEDICARE

## 2020-12-31 DIAGNOSIS — E55.9 VITAMIN D INSUFFICIENCY: ICD-10-CM

## 2020-12-31 DIAGNOSIS — I10 ESSENTIAL HYPERTENSION: ICD-10-CM

## 2020-12-31 DIAGNOSIS — R73.9 ELEVATED BLOOD SUGAR: ICD-10-CM

## 2020-12-31 DIAGNOSIS — Z00.00 ANNUAL PHYSICAL EXAM: ICD-10-CM

## 2020-12-31 LAB
25(OH)D3+25(OH)D2 SERPL-MCNC: 31 NG/ML (ref 30–96)
ALBUMIN SERPL BCP-MCNC: 3.5 G/DL (ref 3.5–5.2)
ALP SERPL-CCNC: 64 U/L (ref 55–135)
ALT SERPL W/O P-5'-P-CCNC: 16 U/L (ref 10–44)
ANION GAP SERPL CALC-SCNC: 11 MMOL/L (ref 8–16)
AST SERPL-CCNC: 22 U/L (ref 10–40)
BASOPHILS # BLD AUTO: 0.07 K/UL (ref 0–0.2)
BASOPHILS NFR BLD: 0.8 % (ref 0–1.9)
BILIRUB SERPL-MCNC: 0.3 MG/DL (ref 0.1–1)
BUN SERPL-MCNC: 19 MG/DL (ref 8–23)
CALCIUM SERPL-MCNC: 9.5 MG/DL (ref 8.7–10.5)
CHLORIDE SERPL-SCNC: 109 MMOL/L (ref 95–110)
CHOLEST SERPL-MCNC: 171 MG/DL (ref 120–199)
CHOLEST/HDLC SERPL: 4.1 {RATIO} (ref 2–5)
CO2 SERPL-SCNC: 22 MMOL/L (ref 23–29)
CREAT SERPL-MCNC: 1 MG/DL (ref 0.5–1.4)
DIFFERENTIAL METHOD: ABNORMAL
EOSINOPHIL # BLD AUTO: 0.4 K/UL (ref 0–0.5)
EOSINOPHIL NFR BLD: 4.9 % (ref 0–8)
ERYTHROCYTE [DISTWIDTH] IN BLOOD BY AUTOMATED COUNT: 16 % (ref 11.5–14.5)
EST. GFR  (AFRICAN AMERICAN): >60 ML/MIN/1.73 M^2
EST. GFR  (NON AFRICAN AMERICAN): 59.7 ML/MIN/1.73 M^2
ESTIMATED AVG GLUCOSE: 117 MG/DL (ref 68–131)
GLUCOSE SERPL-MCNC: 110 MG/DL (ref 70–110)
HBA1C MFR BLD HPLC: 5.7 % (ref 4–5.6)
HCT VFR BLD AUTO: 48.3 % (ref 37–48.5)
HDLC SERPL-MCNC: 42 MG/DL (ref 40–75)
HDLC SERPL: 24.6 % (ref 20–50)
HGB BLD-MCNC: 14.5 G/DL (ref 12–16)
IMM GRANULOCYTES # BLD AUTO: 0.03 K/UL (ref 0–0.04)
IMM GRANULOCYTES NFR BLD AUTO: 0.3 % (ref 0–0.5)
LDLC SERPL CALC-MCNC: 103.2 MG/DL (ref 63–159)
LYMPHOCYTES # BLD AUTO: 3.1 K/UL (ref 1–4.8)
LYMPHOCYTES NFR BLD: 36.3 % (ref 18–48)
MCH RBC QN AUTO: 25 PG (ref 27–31)
MCHC RBC AUTO-ENTMCNC: 30 G/DL (ref 32–36)
MCV RBC AUTO: 83 FL (ref 82–98)
MONOCYTES # BLD AUTO: 0.7 K/UL (ref 0.3–1)
MONOCYTES NFR BLD: 8.6 % (ref 4–15)
NEUTROPHILS # BLD AUTO: 4.3 K/UL (ref 1.8–7.7)
NEUTROPHILS NFR BLD: 49.1 % (ref 38–73)
NONHDLC SERPL-MCNC: 129 MG/DL
NRBC BLD-RTO: 0 /100 WBC
PLATELET # BLD AUTO: 143 K/UL (ref 150–350)
PMV BLD AUTO: 11.8 FL (ref 9.2–12.9)
POTASSIUM SERPL-SCNC: 3.6 MMOL/L (ref 3.5–5.1)
PROT SERPL-MCNC: 6.9 G/DL (ref 6–8.4)
RBC # BLD AUTO: 5.81 M/UL (ref 4–5.4)
SODIUM SERPL-SCNC: 142 MMOL/L (ref 136–145)
TRIGL SERPL-MCNC: 129 MG/DL (ref 30–150)
TSH SERPL DL<=0.005 MIU/L-ACNC: 1.86 UIU/ML (ref 0.4–4)
WBC # BLD AUTO: 8.65 K/UL (ref 3.9–12.7)

## 2020-12-31 PROCEDURE — 36415 COLL VENOUS BLD VENIPUNCTURE: CPT | Mod: PO

## 2020-12-31 PROCEDURE — 82306 VITAMIN D 25 HYDROXY: CPT

## 2020-12-31 PROCEDURE — 80061 LIPID PANEL: CPT

## 2020-12-31 PROCEDURE — 84443 ASSAY THYROID STIM HORMONE: CPT

## 2020-12-31 PROCEDURE — 85025 COMPLETE CBC W/AUTO DIFF WBC: CPT

## 2020-12-31 PROCEDURE — 83036 HEMOGLOBIN GLYCOSYLATED A1C: CPT

## 2020-12-31 PROCEDURE — 80053 COMPREHEN METABOLIC PANEL: CPT

## 2021-01-04 PROBLEM — R73.03 PREDIABETES: Status: ACTIVE | Noted: 2021-01-04

## 2021-01-04 PROBLEM — R71.8 ELEVATED RED BLOOD CELL COUNT: Status: ACTIVE | Noted: 2021-01-04

## 2021-01-05 ENCOUNTER — LAB VISIT (OUTPATIENT)
Dept: LAB | Facility: HOSPITAL | Age: 66
End: 2021-01-05
Attending: INTERNAL MEDICINE
Payer: MEDICARE

## 2021-01-05 ENCOUNTER — TELEPHONE (OUTPATIENT)
Dept: INTERNAL MEDICINE | Facility: CLINIC | Age: 66
End: 2021-01-05

## 2021-01-05 ENCOUNTER — OFFICE VISIT (OUTPATIENT)
Dept: INTERNAL MEDICINE | Facility: CLINIC | Age: 66
End: 2021-01-05
Payer: MEDICARE

## 2021-01-05 VITALS
WEIGHT: 293 LBS | RESPIRATION RATE: 20 BRPM | HEART RATE: 74 BPM | BODY MASS INDEX: 47.09 KG/M2 | DIASTOLIC BLOOD PRESSURE: 98 MMHG | SYSTOLIC BLOOD PRESSURE: 136 MMHG | TEMPERATURE: 98 F | HEIGHT: 66 IN | OXYGEN SATURATION: 96 %

## 2021-01-05 DIAGNOSIS — N18.32 CHRONIC KIDNEY DISEASE, STAGE 3B: ICD-10-CM

## 2021-01-05 DIAGNOSIS — G47.33 OSA ON CPAP: ICD-10-CM

## 2021-01-05 DIAGNOSIS — B37.2 INTERTRIGINOUS CANDIDIASIS: ICD-10-CM

## 2021-01-05 DIAGNOSIS — E66.01 MORBID OBESITY WITH BMI OF 60.0-69.9, ADULT: ICD-10-CM

## 2021-01-05 DIAGNOSIS — E78.49 OTHER HYPERLIPIDEMIA: ICD-10-CM

## 2021-01-05 DIAGNOSIS — D69.6 THROMBOCYTOPENIA: ICD-10-CM

## 2021-01-05 DIAGNOSIS — E55.9 VITAMIN D INSUFFICIENCY: ICD-10-CM

## 2021-01-05 DIAGNOSIS — I10 ESSENTIAL HYPERTENSION: Primary | ICD-10-CM

## 2021-01-05 DIAGNOSIS — D53.9 NUTRITIONAL ANEMIA, UNSPECIFIED: ICD-10-CM

## 2021-01-05 DIAGNOSIS — E23.6 EMPTY SELLA SYNDROME: ICD-10-CM

## 2021-01-05 DIAGNOSIS — R79.89 ABNORMAL CBC: ICD-10-CM

## 2021-01-05 DIAGNOSIS — Z00.00 ANNUAL PHYSICAL EXAM: Primary | ICD-10-CM

## 2021-01-05 DIAGNOSIS — R73.03 PREDIABETES: ICD-10-CM

## 2021-01-05 DIAGNOSIS — G93.2 PSEUDOTUMOR CEREBRI: ICD-10-CM

## 2021-01-05 DIAGNOSIS — Z00.00 ANNUAL PHYSICAL EXAM: ICD-10-CM

## 2021-01-05 LAB
BASOPHILS # BLD AUTO: 0.08 K/UL (ref 0–0.2)
BASOPHILS NFR BLD: 1 % (ref 0–1.9)
DIFFERENTIAL METHOD: ABNORMAL
EOSINOPHIL # BLD AUTO: 0.3 K/UL (ref 0–0.5)
EOSINOPHIL NFR BLD: 3.9 % (ref 0–8)
ERYTHROCYTE [DISTWIDTH] IN BLOOD BY AUTOMATED COUNT: 16.8 % (ref 11.5–14.5)
FERRITIN SERPL-MCNC: 33 NG/ML (ref 20–300)
FOLATE SERPL-MCNC: 19.2 NG/ML (ref 4–24)
HCT VFR BLD AUTO: 49.9 % (ref 37–48.5)
HGB BLD-MCNC: 15 G/DL (ref 12–16)
IMM GRANULOCYTES # BLD AUTO: 0.03 K/UL (ref 0–0.04)
IMM GRANULOCYTES NFR BLD AUTO: 0.4 % (ref 0–0.5)
IRON SERPL-MCNC: 82 UG/DL (ref 30–160)
LYMPHOCYTES # BLD AUTO: 3.1 K/UL (ref 1–4.8)
LYMPHOCYTES NFR BLD: 37.9 % (ref 18–48)
MCH RBC QN AUTO: 25 PG (ref 27–31)
MCHC RBC AUTO-ENTMCNC: 30.1 G/DL (ref 32–36)
MCV RBC AUTO: 83 FL (ref 82–98)
MONOCYTES # BLD AUTO: 0.7 K/UL (ref 0.3–1)
MONOCYTES NFR BLD: 8.6 % (ref 4–15)
NEUTROPHILS # BLD AUTO: 3.9 K/UL (ref 1.8–7.7)
NEUTROPHILS NFR BLD: 48.2 % (ref 38–73)
NRBC BLD-RTO: 0 /100 WBC
PATH REV BLD -IMP: NORMAL
PLATELET # BLD AUTO: 151 K/UL (ref 150–350)
PMV BLD AUTO: 13 FL (ref 9.2–12.9)
RBC # BLD AUTO: 5.99 M/UL (ref 4–5.4)
SATURATED IRON: 18 % (ref 20–50)
TOTAL IRON BINDING CAPACITY: 447 UG/DL (ref 250–450)
TRANSFERRIN SERPL-MCNC: 302 MG/DL (ref 200–375)
VIT B12 SERPL-MCNC: 790 PG/ML (ref 210–950)
WBC # BLD AUTO: 8.15 K/UL (ref 3.9–12.7)

## 2021-01-05 PROCEDURE — 99214 PR OFFICE/OUTPT VISIT, EST, LEVL IV, 30-39 MIN: ICD-10-PCS | Mod: S$PBB,,, | Performed by: INTERNAL MEDICINE

## 2021-01-05 PROCEDURE — 99999 PR PBB SHADOW E&M-EST. PATIENT-LVL V: ICD-10-PCS | Mod: PBBFAC,,, | Performed by: INTERNAL MEDICINE

## 2021-01-05 PROCEDURE — 82607 VITAMIN B-12: CPT

## 2021-01-05 PROCEDURE — 82728 ASSAY OF FERRITIN: CPT

## 2021-01-05 PROCEDURE — 83540 ASSAY OF IRON: CPT

## 2021-01-05 PROCEDURE — 84630 ASSAY OF ZINC: CPT

## 2021-01-05 PROCEDURE — 82746 ASSAY OF FOLIC ACID SERUM: CPT

## 2021-01-05 PROCEDURE — 36415 COLL VENOUS BLD VENIPUNCTURE: CPT | Mod: PO

## 2021-01-05 PROCEDURE — 82525 ASSAY OF COPPER: CPT

## 2021-01-05 PROCEDURE — 85025 COMPLETE CBC W/AUTO DIFF WBC: CPT

## 2021-01-05 PROCEDURE — 99999 PR PBB SHADOW E&M-EST. PATIENT-LVL V: CPT | Mod: PBBFAC,,, | Performed by: INTERNAL MEDICINE

## 2021-01-05 PROCEDURE — 99214 OFFICE O/P EST MOD 30 MIN: CPT | Mod: S$PBB,,, | Performed by: INTERNAL MEDICINE

## 2021-01-05 PROCEDURE — 99215 OFFICE O/P EST HI 40 MIN: CPT | Mod: PBBFAC,PO | Performed by: INTERNAL MEDICINE

## 2021-01-05 RX ORDER — NYSTATIN 100000 [USP'U]/G
POWDER TOPICAL 2 TIMES DAILY
Qty: 60 G | Refills: 0 | Status: SHIPPED | OUTPATIENT
Start: 2021-01-05 | End: 2021-02-02 | Stop reason: SDUPTHER

## 2021-01-05 RX ORDER — CANDESARTAN 4 MG/1
4 TABLET ORAL NIGHTLY
Qty: 90 TABLET | Refills: 0 | Status: SHIPPED | OUTPATIENT
Start: 2021-01-05 | End: 2022-06-22

## 2021-01-07 ENCOUNTER — OFFICE VISIT (OUTPATIENT)
Dept: HEMATOLOGY/ONCOLOGY | Facility: CLINIC | Age: 66
End: 2021-01-07
Payer: MEDICARE

## 2021-01-07 ENCOUNTER — HOSPITAL ENCOUNTER (OUTPATIENT)
Dept: RADIOLOGY | Facility: HOSPITAL | Age: 66
Discharge: HOME | End: 2021-01-07
Attending: SURGERY
Payer: MEDICARE

## 2021-01-07 VITALS — BODY MASS INDEX: 62.14 KG/M2 | WEIGHT: 293 LBS

## 2021-01-07 VITALS
OXYGEN SATURATION: 95 % | BODY MASS INDEX: 47.09 KG/M2 | HEIGHT: 66 IN | WEIGHT: 293 LBS | TEMPERATURE: 98 F | RESPIRATION RATE: 18 BRPM | HEART RATE: 89 BPM

## 2021-01-07 DIAGNOSIS — E66.01 MORBID OBESITY WITH BODY MASS INDEX (BMI) OF 60.0 TO 69.9 IN ADULT: ICD-10-CM

## 2021-01-07 DIAGNOSIS — Z80.3 FAMILY HISTORY OF BREAST CANCER: ICD-10-CM

## 2021-01-07 DIAGNOSIS — Z12.31 ENCOUNTER FOR SCREENING MAMMOGRAM FOR MALIGNANT NEOPLASM OF BREAST: ICD-10-CM

## 2021-01-07 DIAGNOSIS — Z91.89 AT HIGH RISK FOR BREAST CANCER: Primary | ICD-10-CM

## 2021-01-07 DIAGNOSIS — N18.30 STAGE 3 CHRONIC KIDNEY DISEASE, UNSPECIFIED WHETHER STAGE 3A OR 3B CKD: ICD-10-CM

## 2021-01-07 DIAGNOSIS — Z12.39 BREAST CANCER SCREENING, HIGH RISK PATIENT: ICD-10-CM

## 2021-01-07 LAB
COPPER SERPL-MCNC: 1170 UG/L (ref 810–1990)
ZINC SERPL-MCNC: 94 UG/DL (ref 60–130)

## 2021-01-07 PROCEDURE — 77067 SCR MAMMO BI INCL CAD: CPT | Mod: TC

## 2021-01-07 PROCEDURE — 99999 PR PBB SHADOW E&M-EST. PATIENT-LVL V: ICD-10-PCS | Mod: PBBFAC,,, | Performed by: NURSE PRACTITIONER

## 2021-01-07 PROCEDURE — 77067 SCR MAMMO BI INCL CAD: CPT | Mod: 26,,, | Performed by: RADIOLOGY

## 2021-01-07 PROCEDURE — 77063 MAMMO DIGITAL SCREENING BILAT WITH TOMOSYNTHESIS_CAD: ICD-10-PCS | Mod: 26,,, | Performed by: RADIOLOGY

## 2021-01-07 PROCEDURE — 99417 PR PROLONGED SVC, OUTPT, W/WO DIRECT PT CONTACT,  EA ADDTL 15 MIN: ICD-10-PCS | Mod: S$PBB,,, | Performed by: NURSE PRACTITIONER

## 2021-01-07 PROCEDURE — 99215 OFFICE O/P EST HI 40 MIN: CPT | Mod: PBBFAC | Performed by: NURSE PRACTITIONER

## 2021-01-07 PROCEDURE — 99215 PR OFFICE/OUTPT VISIT, EST, LEVL V, 40-54 MIN: ICD-10-PCS | Mod: S$PBB,,, | Performed by: NURSE PRACTITIONER

## 2021-01-07 PROCEDURE — 99999 PR PBB SHADOW E&M-EST. PATIENT-LVL V: CPT | Mod: PBBFAC,,, | Performed by: NURSE PRACTITIONER

## 2021-01-07 PROCEDURE — 99215 OFFICE O/P EST HI 40 MIN: CPT | Mod: S$PBB,,, | Performed by: NURSE PRACTITIONER

## 2021-01-07 PROCEDURE — 77063 BREAST TOMOSYNTHESIS BI: CPT | Mod: 26,,, | Performed by: RADIOLOGY

## 2021-01-07 PROCEDURE — 77067 MAMMO DIGITAL SCREENING BILAT WITH TOMOSYNTHESIS_CAD: ICD-10-PCS | Mod: 26,,, | Performed by: RADIOLOGY

## 2021-01-07 PROCEDURE — 99417 PROLNG OP E/M EACH 15 MIN: CPT | Mod: S$PBB,,, | Performed by: NURSE PRACTITIONER

## 2021-01-11 ENCOUNTER — LAB VISIT (OUTPATIENT)
Dept: LAB | Facility: HOSPITAL | Age: 66
End: 2021-01-11
Attending: INTERNAL MEDICINE
Payer: MEDICARE

## 2021-01-11 DIAGNOSIS — Z00.00 ANNUAL PHYSICAL EXAM: ICD-10-CM

## 2021-01-11 LAB
BACTERIA #/AREA URNS AUTO: NORMAL /HPF
BILIRUB UR QL STRIP: NEGATIVE
CAOX CRY UR QL COMP ASSIST: NORMAL
CLARITY UR REFRACT.AUTO: CLEAR
COLOR UR AUTO: YELLOW
GLUCOSE UR QL STRIP: NEGATIVE
HGB UR QL STRIP: NEGATIVE
KETONES UR QL STRIP: NEGATIVE
LEUKOCYTE ESTERASE UR QL STRIP: NEGATIVE
MICROSCOPIC COMMENT: NORMAL
NITRITE UR QL STRIP: NEGATIVE
PH UR STRIP: 5 [PH] (ref 5–8)
PROT UR QL STRIP: NEGATIVE
SP GR UR STRIP: 1.02 (ref 1–1.03)
SQUAMOUS #/AREA URNS AUTO: 0 /HPF
URN SPEC COLLECT METH UR: NORMAL

## 2021-01-11 PROCEDURE — 81001 URINALYSIS AUTO W/SCOPE: CPT

## 2021-01-15 ENCOUNTER — TELEPHONE (OUTPATIENT)
Dept: INTERNAL MEDICINE | Facility: CLINIC | Age: 66
End: 2021-01-15

## 2021-01-19 ENCOUNTER — PATIENT OUTREACH (OUTPATIENT)
Dept: ADMINISTRATIVE | Facility: HOSPITAL | Age: 66
End: 2021-01-19

## 2021-01-19 ENCOUNTER — TELEPHONE (OUTPATIENT)
Dept: INTERNAL MEDICINE | Facility: CLINIC | Age: 66
End: 2021-01-19

## 2021-02-02 ENCOUNTER — TELEPHONE (OUTPATIENT)
Dept: INTERNAL MEDICINE | Facility: CLINIC | Age: 66
End: 2021-02-02

## 2021-02-02 DIAGNOSIS — G47.00 INSOMNIA, UNSPECIFIED TYPE: ICD-10-CM

## 2021-02-02 DIAGNOSIS — B37.2 INTERTRIGINOUS CANDIDIASIS: ICD-10-CM

## 2021-02-02 RX ORDER — TRAZODONE HYDROCHLORIDE 50 MG/1
50 TABLET ORAL NIGHTLY
Qty: 90 TABLET | Refills: 1 | Status: SHIPPED | OUTPATIENT
Start: 2021-02-02 | End: 2021-08-28

## 2021-02-02 RX ORDER — NYSTATIN 100000 [USP'U]/G
POWDER TOPICAL 2 TIMES DAILY
Qty: 60 G | Refills: 1 | Status: SHIPPED | OUTPATIENT
Start: 2021-02-02 | End: 2022-05-17

## 2021-02-15 ENCOUNTER — PATIENT OUTREACH (OUTPATIENT)
Dept: ADMINISTRATIVE | Facility: OTHER | Age: 66
End: 2021-02-15

## 2021-02-17 ENCOUNTER — OFFICE VISIT (OUTPATIENT)
Dept: INTERNAL MEDICINE | Facility: CLINIC | Age: 66
End: 2021-02-17
Payer: MEDICARE

## 2021-02-17 ENCOUNTER — PATIENT MESSAGE (OUTPATIENT)
Dept: INTERNAL MEDICINE | Facility: CLINIC | Age: 66
End: 2021-02-17

## 2021-02-17 ENCOUNTER — TELEPHONE (OUTPATIENT)
Dept: SLEEP MEDICINE | Facility: CLINIC | Age: 66
End: 2021-02-17

## 2021-02-17 DIAGNOSIS — Z87.448 HISTORY OF BLADDER SUSPENSION PROCEDURE: ICD-10-CM

## 2021-02-17 DIAGNOSIS — E66.01 MORBID OBESITY WITH BMI OF 60.0-69.9, ADULT: ICD-10-CM

## 2021-02-17 DIAGNOSIS — M17.0 PRIMARY OSTEOARTHRITIS OF BOTH KNEES: ICD-10-CM

## 2021-02-17 DIAGNOSIS — R73.03 PREDIABETES: ICD-10-CM

## 2021-02-17 DIAGNOSIS — I10 ESSENTIAL HYPERTENSION: Primary | ICD-10-CM

## 2021-02-17 DIAGNOSIS — Z98.890 HISTORY OF BLADDER SUSPENSION PROCEDURE: ICD-10-CM

## 2021-02-17 DIAGNOSIS — N32.89 BLADDER IRRITATION: ICD-10-CM

## 2021-02-17 PROCEDURE — 99214 PR OFFICE/OUTPT VISIT, EST, LEVL IV, 30-39 MIN: ICD-10-PCS | Mod: 95,,, | Performed by: INTERNAL MEDICINE

## 2021-02-17 PROCEDURE — 99214 OFFICE O/P EST MOD 30 MIN: CPT | Mod: 95,,, | Performed by: INTERNAL MEDICINE

## 2021-02-18 ENCOUNTER — OFFICE VISIT (OUTPATIENT)
Dept: SLEEP MEDICINE | Facility: CLINIC | Age: 66
End: 2021-02-18
Payer: MEDICARE

## 2021-02-18 VITALS
BODY MASS INDEX: 47.09 KG/M2 | TEMPERATURE: 98 F | DIASTOLIC BLOOD PRESSURE: 88 MMHG | SYSTOLIC BLOOD PRESSURE: 152 MMHG | WEIGHT: 293 LBS | HEART RATE: 67 BPM | HEIGHT: 66 IN

## 2021-02-18 DIAGNOSIS — G47.33 OSA (OBSTRUCTIVE SLEEP APNEA): ICD-10-CM

## 2021-02-18 DIAGNOSIS — E66.01 MORBID OBESITY WITH BMI OF 60.0-69.9, ADULT: ICD-10-CM

## 2021-02-18 DIAGNOSIS — I10 ESSENTIAL HYPERTENSION: Primary | ICD-10-CM

## 2021-02-18 PROCEDURE — 99213 OFFICE O/P EST LOW 20 MIN: CPT | Mod: S$PBB,,, | Performed by: NURSE PRACTITIONER

## 2021-02-18 PROCEDURE — 99214 OFFICE O/P EST MOD 30 MIN: CPT | Mod: PBBFAC,PO | Performed by: NURSE PRACTITIONER

## 2021-02-18 PROCEDURE — 99213 PR OFFICE/OUTPT VISIT, EST, LEVL III, 20-29 MIN: ICD-10-PCS | Mod: S$PBB,,, | Performed by: NURSE PRACTITIONER

## 2021-02-18 PROCEDURE — 99999 PR PBB SHADOW E&M-EST. PATIENT-LVL IV: CPT | Mod: PBBFAC,,, | Performed by: NURSE PRACTITIONER

## 2021-02-18 PROCEDURE — 99999 PR PBB SHADOW E&M-EST. PATIENT-LVL IV: ICD-10-PCS | Mod: PBBFAC,,, | Performed by: NURSE PRACTITIONER

## 2021-02-24 ENCOUNTER — PES CALL (OUTPATIENT)
Dept: ADMINISTRATIVE | Facility: CLINIC | Age: 66
End: 2021-02-24

## 2021-02-28 RX ORDER — METFORMIN HYDROCHLORIDE 500 MG/1
500 TABLET ORAL
Qty: 90 TABLET | Refills: 0 | Status: SHIPPED | OUTPATIENT
Start: 2021-02-28 | End: 2021-03-23

## 2021-03-01 ENCOUNTER — NUTRITION (OUTPATIENT)
Dept: NUTRITION | Facility: CLINIC | Age: 66
End: 2021-03-01
Payer: MEDICARE

## 2021-03-01 VITALS — HEIGHT: 66 IN | BODY MASS INDEX: 47.09 KG/M2 | WEIGHT: 293 LBS

## 2021-03-01 DIAGNOSIS — N18.32 CHRONIC KIDNEY DISEASE, STAGE 3B: Primary | ICD-10-CM

## 2021-03-01 DIAGNOSIS — I10 ESSENTIAL HYPERTENSION: ICD-10-CM

## 2021-03-01 DIAGNOSIS — R73.03 PREDIABETES: ICD-10-CM

## 2021-03-01 DIAGNOSIS — E66.01 MORBID OBESITY WITH BODY MASS INDEX OF 60.0-69.9 IN ADULT: ICD-10-CM

## 2021-03-01 DIAGNOSIS — Z71.3 DIETARY COUNSELING: ICD-10-CM

## 2021-03-01 PROCEDURE — 99999 PR PBB SHADOW E&M-EST. PATIENT-LVL IV: ICD-10-PCS | Mod: PBBFAC,,,

## 2021-03-01 PROCEDURE — 99999 PR PBB SHADOW E&M-EST. PATIENT-LVL IV: CPT | Mod: PBBFAC,,,

## 2021-03-01 PROCEDURE — 97802 MEDICAL NUTRITION INDIV IN: CPT | Mod: PBBFAC,PO | Performed by: DIETITIAN, REGISTERED

## 2021-03-01 PROCEDURE — 99214 OFFICE O/P EST MOD 30 MIN: CPT | Mod: PBBFAC,PO

## 2021-03-22 ENCOUNTER — OFFICE VISIT (OUTPATIENT)
Dept: INTERNAL MEDICINE | Facility: CLINIC | Age: 66
End: 2021-03-22
Payer: MEDICARE

## 2021-03-22 VITALS — SYSTOLIC BLOOD PRESSURE: 122 MMHG | DIASTOLIC BLOOD PRESSURE: 78 MMHG

## 2021-03-22 DIAGNOSIS — M17.0 PRIMARY OSTEOARTHRITIS OF BOTH KNEES: ICD-10-CM

## 2021-03-22 DIAGNOSIS — R73.03 PREDIABETES: ICD-10-CM

## 2021-03-22 DIAGNOSIS — R71.8 ELEVATED RED BLOOD CELL COUNT: ICD-10-CM

## 2021-03-22 DIAGNOSIS — I10 ESSENTIAL HYPERTENSION: Primary | ICD-10-CM

## 2021-03-22 PROCEDURE — 99213 PR OFFICE/OUTPT VISIT, EST, LEVL III, 20-29 MIN: ICD-10-PCS | Mod: 95,,, | Performed by: INTERNAL MEDICINE

## 2021-03-22 PROCEDURE — 99213 OFFICE O/P EST LOW 20 MIN: CPT | Mod: 95,,, | Performed by: INTERNAL MEDICINE

## 2021-03-24 ENCOUNTER — PATIENT MESSAGE (OUTPATIENT)
Dept: INTERNAL MEDICINE | Facility: CLINIC | Age: 66
End: 2021-03-24

## 2021-03-24 ENCOUNTER — TELEPHONE (OUTPATIENT)
Dept: INTERNAL MEDICINE | Facility: CLINIC | Age: 66
End: 2021-03-24

## 2021-03-29 ENCOUNTER — LAB VISIT (OUTPATIENT)
Dept: LAB | Facility: HOSPITAL | Age: 66
End: 2021-03-29
Attending: INTERNAL MEDICINE
Payer: MEDICARE

## 2021-03-29 DIAGNOSIS — I10 ESSENTIAL HYPERTENSION: ICD-10-CM

## 2021-03-29 DIAGNOSIS — R71.8 ELEVATED RED BLOOD CELL COUNT: ICD-10-CM

## 2021-03-29 DIAGNOSIS — R73.03 PREDIABETES: ICD-10-CM

## 2021-03-29 LAB
ALBUMIN SERPL BCP-MCNC: 3.5 G/DL (ref 3.5–5.2)
ALP SERPL-CCNC: 64 U/L (ref 55–135)
ALT SERPL W/O P-5'-P-CCNC: 15 U/L (ref 10–44)
ANION GAP SERPL CALC-SCNC: 10 MMOL/L (ref 8–16)
AST SERPL-CCNC: 18 U/L (ref 10–40)
BASOPHILS # BLD AUTO: 0.06 K/UL (ref 0–0.2)
BASOPHILS NFR BLD: 0.8 % (ref 0–1.9)
BILIRUB SERPL-MCNC: 0.3 MG/DL (ref 0.1–1)
BUN SERPL-MCNC: 18 MG/DL (ref 8–23)
CALCIUM SERPL-MCNC: 9.4 MG/DL (ref 8.7–10.5)
CHLORIDE SERPL-SCNC: 106 MMOL/L (ref 95–110)
CO2 SERPL-SCNC: 25 MMOL/L (ref 23–29)
CREAT SERPL-MCNC: 1.1 MG/DL (ref 0.5–1.4)
DIFFERENTIAL METHOD: ABNORMAL
EOSINOPHIL # BLD AUTO: 0.3 K/UL (ref 0–0.5)
EOSINOPHIL NFR BLD: 3.5 % (ref 0–8)
ERYTHROCYTE [DISTWIDTH] IN BLOOD BY AUTOMATED COUNT: 15.4 % (ref 11.5–14.5)
EST. GFR  (AFRICAN AMERICAN): >60 ML/MIN/1.73 M^2
EST. GFR  (NON AFRICAN AMERICAN): 53.2 ML/MIN/1.73 M^2
GLUCOSE SERPL-MCNC: 121 MG/DL (ref 70–110)
HCT VFR BLD AUTO: 48.7 % (ref 37–48.5)
HGB BLD-MCNC: 15.2 G/DL (ref 12–16)
IMM GRANULOCYTES # BLD AUTO: 0.03 K/UL (ref 0–0.04)
IMM GRANULOCYTES NFR BLD AUTO: 0.4 % (ref 0–0.5)
LYMPHOCYTES # BLD AUTO: 2.5 K/UL (ref 1–4.8)
LYMPHOCYTES NFR BLD: 32.6 % (ref 18–48)
MCH RBC QN AUTO: 25.9 PG (ref 27–31)
MCHC RBC AUTO-ENTMCNC: 31.2 G/DL (ref 32–36)
MCV RBC AUTO: 83 FL (ref 82–98)
MONOCYTES # BLD AUTO: 0.7 K/UL (ref 0.3–1)
MONOCYTES NFR BLD: 8.6 % (ref 4–15)
NEUTROPHILS # BLD AUTO: 4.2 K/UL (ref 1.8–7.7)
NEUTROPHILS NFR BLD: 54.1 % (ref 38–73)
NRBC BLD-RTO: 0 /100 WBC
PATH REV BLD -IMP: NORMAL
PLATELET # BLD AUTO: 138 K/UL (ref 150–450)
PMV BLD AUTO: 11.9 FL (ref 9.2–12.9)
POTASSIUM SERPL-SCNC: 3.5 MMOL/L (ref 3.5–5.1)
PROT SERPL-MCNC: 7 G/DL (ref 6–8.4)
RBC # BLD AUTO: 5.88 M/UL (ref 4–5.4)
SODIUM SERPL-SCNC: 141 MMOL/L (ref 136–145)
WBC # BLD AUTO: 7.78 K/UL (ref 3.9–12.7)

## 2021-03-29 PROCEDURE — 85060 PATHOLOGIST REVIEW: ICD-10-PCS | Mod: ,,, | Performed by: PATHOLOGY

## 2021-03-29 PROCEDURE — 80053 COMPREHEN METABOLIC PANEL: CPT | Performed by: INTERNAL MEDICINE

## 2021-03-29 PROCEDURE — 36415 COLL VENOUS BLD VENIPUNCTURE: CPT | Mod: PO | Performed by: INTERNAL MEDICINE

## 2021-03-29 PROCEDURE — 85025 COMPLETE CBC W/AUTO DIFF WBC: CPT | Performed by: INTERNAL MEDICINE

## 2021-03-29 PROCEDURE — 82668 ASSAY OF ERYTHROPOIETIN: CPT | Performed by: INTERNAL MEDICINE

## 2021-03-29 PROCEDURE — 85060 BLOOD SMEAR INTERPRETATION: CPT | Mod: ,,, | Performed by: PATHOLOGY

## 2021-03-30 LAB — PATH REV BLD -IMP: NORMAL

## 2021-04-01 LAB — EPO SERPL-ACNC: 14.6 MIU/ML (ref 2.6–18.5)

## 2021-07-08 ENCOUNTER — HOSPITAL ENCOUNTER (OUTPATIENT)
Dept: RADIOLOGY | Facility: HOSPITAL | Age: 66
Discharge: HOME | End: 2021-07-08
Attending: NURSE PRACTITIONER
Payer: MEDICARE

## 2021-07-08 VITALS — BODY MASS INDEX: 47.09 KG/M2 | WEIGHT: 293 LBS | HEIGHT: 66 IN

## 2021-07-08 DIAGNOSIS — Z91.89 AT HIGH RISK FOR BREAST CANCER: ICD-10-CM

## 2021-07-08 DIAGNOSIS — Z80.3 FAMILY HISTORY OF BREAST CANCER: ICD-10-CM

## 2021-07-08 PROCEDURE — 76641 US BREAST BILATERAL COMPLETE: ICD-10-PCS | Mod: 26,50,, | Performed by: RADIOLOGY

## 2021-07-08 PROCEDURE — 76641 ULTRASOUND BREAST COMPLETE: CPT | Mod: 26,50,, | Performed by: RADIOLOGY

## 2021-07-08 PROCEDURE — 76641 ULTRASOUND BREAST COMPLETE: CPT | Mod: TC,50

## 2021-09-12 PROBLEM — D75.1 ERYTHROCYTOSIS: Status: ACTIVE | Noted: 2021-09-12

## 2021-10-12 DIAGNOSIS — I10 ESSENTIAL HYPERTENSION: Primary | ICD-10-CM

## 2021-10-12 DIAGNOSIS — E78.00 PURE HYPERCHOLESTEROLEMIA: ICD-10-CM

## 2022-01-03 ENCOUNTER — PATIENT MESSAGE (OUTPATIENT)
Dept: CARDIOLOGY | Facility: CLINIC | Age: 67
End: 2022-01-03
Payer: MEDICARE

## 2022-01-03 ENCOUNTER — LAB VISIT (OUTPATIENT)
Dept: LAB | Facility: HOSPITAL | Age: 67
End: 2022-01-03
Attending: INTERNAL MEDICINE
Payer: MEDICARE

## 2022-01-03 DIAGNOSIS — I10 ESSENTIAL HYPERTENSION: ICD-10-CM

## 2022-01-03 DIAGNOSIS — E78.00 PURE HYPERCHOLESTEROLEMIA: ICD-10-CM

## 2022-01-03 LAB
ALBUMIN SERPL BCP-MCNC: 3.7 G/DL (ref 3.5–5.2)
ALP SERPL-CCNC: 41 U/L (ref 55–135)
ALT SERPL W/O P-5'-P-CCNC: 31 U/L (ref 10–44)
ANION GAP SERPL CALC-SCNC: 8 MMOL/L (ref 8–16)
AST SERPL-CCNC: 28 U/L (ref 10–40)
BILIRUB SERPL-MCNC: 0.3 MG/DL (ref 0.1–1)
BUN SERPL-MCNC: 26 MG/DL (ref 8–23)
CALCIUM SERPL-MCNC: 10 MG/DL (ref 8.7–10.5)
CHLORIDE SERPL-SCNC: 108 MMOL/L (ref 95–110)
CHOLEST SERPL-MCNC: 148 MG/DL (ref 120–199)
CHOLEST/HDLC SERPL: 3.8 {RATIO} (ref 2–5)
CO2 SERPL-SCNC: 26 MMOL/L (ref 23–29)
CREAT SERPL-MCNC: 1 MG/DL (ref 0.5–1.4)
EST. GFR  (AFRICAN AMERICAN): >60 ML/MIN/1.73 M^2
EST. GFR  (NON AFRICAN AMERICAN): 59.3 ML/MIN/1.73 M^2
GLUCOSE SERPL-MCNC: 83 MG/DL (ref 70–110)
HDLC SERPL-MCNC: 39 MG/DL (ref 40–75)
HDLC SERPL: 26.4 % (ref 20–50)
LDLC SERPL CALC-MCNC: 86.2 MG/DL (ref 63–159)
NONHDLC SERPL-MCNC: 109 MG/DL
POTASSIUM SERPL-SCNC: 3.5 MMOL/L (ref 3.5–5.1)
PROT SERPL-MCNC: 6.8 G/DL (ref 6–8.4)
SODIUM SERPL-SCNC: 142 MMOL/L (ref 136–145)
TRIGL SERPL-MCNC: 114 MG/DL (ref 30–150)

## 2022-01-03 PROCEDURE — 36415 COLL VENOUS BLD VENIPUNCTURE: CPT | Mod: PO | Performed by: INTERNAL MEDICINE

## 2022-01-03 PROCEDURE — 80053 COMPREHEN METABOLIC PANEL: CPT | Performed by: INTERNAL MEDICINE

## 2022-01-03 PROCEDURE — 80061 LIPID PANEL: CPT | Performed by: INTERNAL MEDICINE

## 2022-01-05 ENCOUNTER — PATIENT OUTREACH (OUTPATIENT)
Dept: ADMINISTRATIVE | Facility: OTHER | Age: 67
End: 2022-01-05
Payer: MEDICARE

## 2022-01-05 NOTE — PROGRESS NOTES
Care Everywhere: updated  Immunization: updated  Health Maintenance: updated  Media Review: review for outside colon cancer report   Legacy Review:   DIS:  Order placed:   Upcoming appts:mammogram 1.13.2022  EFAX: sent to Dr. Wong office to possibly obtain updated colon cancer report   Task Tickets:  Referrals:

## 2022-01-05 NOTE — LETTER
AUTHORIZATION FOR RELEASE OF   CONFIDENTIAL INFORMATION    Dear Farncis Crump MD,    We are seeing Joy Singer, date of birth 1955, in the clinic at [facility]. Faiza Kuhn MD is the patient's PCP. Joy Singer has an outstanding lab/procedure at the time we reviewed her chart. In order to help keep her health information updated, she has authorized us to request the following medical record(s):        (  )  MAMMOGRAM                                      ( x )  COLONOSCOPY after 2016      (  )  PAP SMEAR                                          (  )  OUTSIDE LAB RESULTS     (  )  DEXA SCAN                                          (  )  EYE EXAM            (  )  FOOT EXAM                                          (  )  ENTIRE RECORD     (  )  OUTSIDE IMMUNIZATIONS                 (  )  _______________         Please fax records to [facility], Linnea T Perkins, MD, [phone]     If you have any questions, please contact Minerva nEgel at [phone].           Patient Name: Joy Singer  : 1955  Patient Phone #: [phone]

## 2022-01-06 ENCOUNTER — OFFICE VISIT (OUTPATIENT)
Dept: CARDIOLOGY | Facility: CLINIC | Age: 67
End: 2022-01-06
Payer: MEDICARE

## 2022-01-06 VITALS
HEIGHT: 66 IN | DIASTOLIC BLOOD PRESSURE: 54 MMHG | SYSTOLIC BLOOD PRESSURE: 96 MMHG | HEART RATE: 62 BPM | WEIGHT: 293 LBS | BODY MASS INDEX: 47.09 KG/M2

## 2022-01-06 DIAGNOSIS — E87.6 HYPOKALEMIA: ICD-10-CM

## 2022-01-06 DIAGNOSIS — G93.2 PSEUDOTUMOR CEREBRI: ICD-10-CM

## 2022-01-06 DIAGNOSIS — I10 PRIMARY HYPERTENSION: Primary | ICD-10-CM

## 2022-01-06 DIAGNOSIS — M79.89 LEG SWELLING: ICD-10-CM

## 2022-01-06 DIAGNOSIS — E78.00 PURE HYPERCHOLESTEROLEMIA: ICD-10-CM

## 2022-01-06 DIAGNOSIS — E66.01 MORBID OBESITY WITH BMI OF 60.0-69.9, ADULT: ICD-10-CM

## 2022-01-06 PROCEDURE — 93010 ELECTROCARDIOGRAM REPORT: CPT | Mod: S$PBB,,, | Performed by: INTERNAL MEDICINE

## 2022-01-06 PROCEDURE — 99213 OFFICE O/P EST LOW 20 MIN: CPT | Mod: S$PBB,,, | Performed by: INTERNAL MEDICINE

## 2022-01-06 PROCEDURE — 99213 PR OFFICE/OUTPT VISIT, EST, LEVL III, 20-29 MIN: ICD-10-PCS | Mod: S$PBB,,, | Performed by: INTERNAL MEDICINE

## 2022-01-06 PROCEDURE — 93005 ELECTROCARDIOGRAM TRACING: CPT | Mod: PBBFAC,PO | Performed by: INTERNAL MEDICINE

## 2022-01-06 PROCEDURE — 99215 OFFICE O/P EST HI 40 MIN: CPT | Mod: PBBFAC,PO | Performed by: INTERNAL MEDICINE

## 2022-01-06 PROCEDURE — 99999 PR PBB SHADOW E&M-EST. PATIENT-LVL V: CPT | Mod: PBBFAC,,, | Performed by: INTERNAL MEDICINE

## 2022-01-06 PROCEDURE — 99999 PR PBB SHADOW E&M-EST. PATIENT-LVL V: ICD-10-PCS | Mod: PBBFAC,,, | Performed by: INTERNAL MEDICINE

## 2022-01-06 PROCEDURE — 93010 EKG 12-LEAD: ICD-10-PCS | Mod: S$PBB,,, | Performed by: INTERNAL MEDICINE

## 2022-01-06 NOTE — PROGRESS NOTES
Subjective:     Problem List:  Leg swelling  HTN  Sleep apnea  Pseudotumor cerebri  Multiple colonic polyp  BMI ~60    HPI:   Joy Singer is a 66 y.o. female who presents for follow-up of Hypertension and Hyperlipidemia  Treated w candesartan and low dose chlorthalidone. She also takes acetazolamide presumably for the idiopathic intracranial hypertension (pseudotumor cerebri). On low dose KCl, serum K is <4.  Trying to loose weight. Since 1/2021 her weight has declined from 390 to 383 lbs. Between 6/2019 and 5/2020 her weight was recorded b/w 370 and 292 lbs.     She is unable to exercise. She requires 2 canes to walk.        Review of patient's allergies indicates:   Allergen Reactions    Insect venom Edema, Itching and Swelling    Percocet [oxycodone-acetaminophen] Itching    Venom-honey bee     Unable to assess Rash     Insect Bites        Current Outpatient Medications   Medication Sig    acetaZOLAMIDE (DIAMOX) 250 MG tablet Take 250 mg by mouth 2 (two) times daily.     ascorbic acid, vitamin C, 500 mg TbSR Take by mouth once daily.    aspirin 325 MG tablet Take 1 tablet by mouth every 6 to 8 hours as needed.    calcium carbonate-vitamin D3 600 mg(1,500mg) -500 unit Cap Calcium 600 with Vitamin D3 600 mg (1,500 mg)-500 unit capsule   Take 1 capsule twice a day by oral route.    candesartan (ATACAND) 4 MG tablet Take 1 tablet (4 mg total) by mouth every evening.    carisoprodol (SOMA) 350 MG tablet Take 350 mg by mouth continuous prn.    chlorthalidone (HYGROTEN) 25 MG Tab Take 0.5 tablets (12.5 mg total) by mouth once daily.    clotrimazole-betamethasone 1-0.05% (LOTRISONE) cream Apply topically 2 (two) times daily. To rash on buttocks    diphenhydrAMINE (BENADRYL) 25 mg capsule Take 25 mg by mouth every 6 (six) hours as needed for Itching.    docusate sodium (COLACE) 100 MG capsule Colace 100 mg capsule   Take 2 capsule twice a day by oral route.    folic acid (FOLVITE) 400 MCG tablet  "Take 400 mcg by mouth once daily.    hydrocodone-acetaminophen 7.5-325mg (NORCO) 7.5-325 mg per tablet TK 1 T PO Q 6 TO 8 H PRN    hydrocortisone (ANUSOL-HC) 2.5 % rectal cream Place rectally 2 (two) times daily.    ketoconazole 2 % Foam Extina 2 % topical foam as needed    multivitamin-iron-folic acid (CENTRUM) Tab Take 1 tablet by mouth once daily.    nystatin (MYCOSTATIN) powder Apply topically 2 (two) times daily.    phenylephrine-guaifenesin (DECONEX IR)  mg Tab Take 1 tablet by mouth as needed.    polyethylene glycol (GLYCOLAX) 17 gram/dose powder polyethylene glycol 3350 17 gram/dose oral powder as needed    potassium chloride SA (K-DUR,KLOR-CON) 10 MEQ tablet TAKE 1 TABLET DAILY    pravastatin (PRAVACHOL) 40 MG tablet TAKE 1 TABLET DAILY    traZODone (DESYREL) 50 MG tablet TAKE 1 TABLET(50 MG) BY MOUTH EVERY EVENING    vitamin E 1000 UNIT capsule Take 1,000 Units by mouth once daily.     No current facility-administered medications for this visit.       Social history:  Joy Singer  reports that she has never smoked. She has never used smokeless tobacco. She reports current alcohol use. She reports that she does not use drugs.      Objective:     BP (!) 96/54   Pulse 62   Ht 5' 6" (1.676 m)   Wt (!) 176.1 kg (388 lb 1.9 oz)   LMP 01/01/2004   BMI 62.64 kg/m²    Physical Exam  Constitutional:       Appearance: Normal appearance. She is well-developed and well-nourished.   Neck:      Vascular: No JVD.   Cardiovascular:      Rate and Rhythm: Normal rate and regular rhythm.      Pulses:           Radial pulses are 2+ on the right side and 2+ on the left side.        Dorsalis pedis pulses are 2+ on the right side and 2+ on the left side.      Heart sounds: No murmur heard.      Pulmonary:      Breath sounds: No decreased breath sounds, wheezing or rales.   Chest:      Chest wall: There is no dullness to percussion.   Abdominal:      Palpations: Abdomen is soft. There is no hepatomegaly " or splenomegaly.      Tenderness: There is no abdominal tenderness.   Musculoskeletal:      Right lower leg: Swelling present.      Left lower leg: Swelling present.   Skin:     General: Skin is warm.      Findings: No bruising.      Nails: There is no clubbing.   Neurological:      Mental Status: She is alert and oriented to person, place, and time.   Psychiatric:         Speech: Speech normal.         Behavior: Behavior normal.         Cognition and Memory: Cognition and memory normal.             Lab Results   Component Value Date    CHOL 148 01/03/2022    HDL 39 (L) 01/03/2022    LDLCALC 86.2 01/03/2022    TRIG 114 01/03/2022    CHOLHDL 26.4 01/03/2022     Lab Results   Component Value Date    GLU 83 01/03/2022    CREATININE 1.0 01/03/2022    BUN 26 (H) 01/03/2022     01/03/2022    K 3.5 01/03/2022     01/03/2022    CO2 26 01/03/2022     Lab Results   Component Value Date    ALT 31 01/03/2022    AST 28 01/03/2022    ALKPHOS 41 (L) 01/03/2022    BILITOT 0.3 01/03/2022       Results for orders placed in visit on 11/08/18    Transthoracic echo (TTE) complete    Interpretation Summary  · Left ventricle ejection fraction is normal at 68%  · Left ventricle shows concentric remodeling.  · Normal LV diastolic function.  · RV systolic function is normal.  · Right atrium is mildly dilated.  · Trace tricuspid regurgitation.  · Normal central venous pressure (3 mm Hg).  · The estimated PA systolic pressure is 31.30 mm Hg       Reviewed ECG performed today.        Assessment and Plan:       ICD-10-CM ICD-9-CM   1. Primary hypertension  I10 401.9   2. Pure hypercholesterolemia  E78.00 272.0   3. Leg swelling  M79.89 729.81   4. BMI > 60  E66.01 278.01    Z68.44 V85.44   5. Pseudotumor cerebri  G93.2 348.2   6. Hypokalemia  E87.6 276.8        HTN (hypertension)  Stable. Continue same medications for hypertension. Low sodium diet.    Pure hypercholesterolemia  LDL <100, but HDL is <40 mg/dl. Continue same dose of  pravastatin.   Cholesterol education. Recommended a healthy diet that emphasizes the intake of vegetables, fruits, nuts, whole grains, vegetable or lean animal protein and fish and minimizes the intake of trans fats, saturated fats, red meat and processed red meats, refined carbohydrates and sweetened beverages.      Pseudotumor cerebri  Should fup w Neurology. Will discuss w Dr. Kuhn.    Hypokalemia  Increase KCl to 20 meq a day       Orders placed during this encounter:     Primary hypertension  -     IN OFFICE EKG 12-LEAD (to Muse); Future  -     Comprehensive Metabolic Panel; Future; Expected date: 08/13/2023    Pure hypercholesterolemia  -     Lipid Panel; Future; Expected date: 08/13/2023    Leg swelling    BMI > 60    Pseudotumor cerebri    Hypokalemia       -     Comprehensive Metabolic Panel; Future; Expected date: 08/13/2023         Follow up in about 18 months (around 7/6/2023), or if symptoms worsen or fail to improve.

## 2022-01-06 NOTE — PATIENT INSTRUCTIONS
Keep taking the candesartan at night for your blood pressure.  It is good medicine for the heart.  Let myself or Dr. Kuhn know if your systolic blood pressure drops any further and we will decide if you should stop or decrease the dose.    Cholesterol - total  LDL - bad type - lower is better.   HDL good type - higher is better  Your LDL should be less than 100. Do not look at the reference range in [facility] labs. Those do not apply to you!    LDL - bad type - improves with diet and medications: typically statins; most other medications that lower LDL have not been proven to prevent heart attacks.  May not improve significantly with exercise alone.  Should be less than 70 mg/dl, but the lower the better. Statins (cholesterol lowering meds) lower LDL and also reduce inflammation within blood vessels.    HDL - good type - improves with exercise.  Ideally greater than 50 mg/dl. The proportion of HDL to the total cholesterol is more important than the absolute HDL.  This means a HDL of 45 out of a total cholesterol of 130 mg'dl is pretty good, but the same HDL out of a total of  200 mg/dl is not quite as good. A level of 30% or higher is ideal.    TGs (triglycerides) - also bad - can change very quickly and considerably with certain foods. Improve with diet, exercise and high dose fish oil.  In some cases a low carbohydrate diet will lower TGs better than a low fat diet.  Ideal range  mg/dl.        Sugar, fat and cholesterol in food:     A sensible diet that limits the intake of sugars, saturated (bad) fats and trans fats while increasing the intake of unsaturated (good) fats and plant proteins is the basis of the current dietary recommendations.      We now recommend drastically reducing the intake of sugar and sugary drinks. There is less emphasis on excluding all fat and more emphasis on the types of different fats. We continue to recommend eating more vegetables.    Cholesterol in our food is generally  "present in relatively small amounts. New dietary guidelines are less obsessed with the amount of cholesterol. However please do not confuse this with the role of cholesterol in our blood and arteries. The liver converts certain foods into cholesterol.  It is this cholesterol and other fats that clog up our arteries.      Most foods that are high in cholesterol are also high in saturated fat. But there is much more saturated fat than cholesterol in these foods. The saturated fat content matters more than cholesterol. On the other hand, there are a handful of foods that are high in cholesterol but do not contain much saturated fat: eggs, shrimp, crab legs and crawfish are OK to eat in small quantities as long as you do not deep good them. So a few (2-3) eggs a week are fine (both the white and the yolk), but you can eat as many egg whites as you want. Also, some of these same foods irritate the inner lining of blood vessels by inducing inflammation (see below).  This occurs even if your blood cholesterol levels are "normal". So you should avoid foods that are high in saturated fat and sugar even if your blood cholesterol levels are normal.      Saturated fat is the bad fat - you should limit your intake of this. Deep fried foods, meats and other animal fats are high in saturated fat. Cookies, donuts and most dessert and cakes are usually high in both saturated fat and sugar.       Unsaturated fat is the good fat. It contains the same number of calories as saturated fat but these fats do not get deposited in our arteries. The Mediterranean style diet encourages the intake of unsaturated fat - olive oil, avocado and unsalted nuts. So instead of baking a piece of fish, consider pan-frying it using olive oil.     You should eat a few servings of vegetables (and fruit as long as you are not diabetic) everyday. Substitute some plant proteins in place of meat: beans, lentils, quinoa and oatmeal. They are lean " proteins.    Vegetables (particularly green vegetables such as broccoli, kale and spinach) have anti-inflammatory properties. Some fruits (certain berries) have anti-oxidant properties. However I think foods that reduce vascular inflammation are much  more important than the anti-oxidant effect of fruits and I predict that we will be prescribing anti inflammatory medication specifically for blood vessels to prevent heart attacks in the future. In the mean time eat more of the vegetables with anti-oxidant properties.     Do not use stick butter or stick margarine. Butter that comes in a tub is soft butter and consists of 1/2 butter and 1/2 vegetable oil (either canola or olive oil). It is preferable to use soft butter in small quantities. Avocado butter is also an option.      Trans fats should definitely be avoided. Most foods that are labelled as containing 0 gms of trans fat may still contain several hundred milligrams of trans fat: creamer, margarine, dough, deep fried foods, ready made frosting, potato, corn and torilla chips, cakes, cookies, pie crusts and crackers containing shortening made with hydrogenated vegetable oil.

## 2022-01-13 ENCOUNTER — OFFICE VISIT (OUTPATIENT)
Dept: HEMATOLOGY/ONCOLOGY | Facility: CLINIC | Age: 67
End: 2022-01-13
Payer: MEDICARE

## 2022-01-13 ENCOUNTER — HOSPITAL ENCOUNTER (OUTPATIENT)
Dept: RADIOLOGY | Facility: HOSPITAL | Age: 67
Discharge: HOME | End: 2022-01-13
Attending: NURSE PRACTITIONER
Payer: MEDICARE

## 2022-01-13 VITALS — HEIGHT: 66 IN | BODY MASS INDEX: 47.09 KG/M2 | WEIGHT: 293 LBS

## 2022-01-13 VITALS
HEIGHT: 66 IN | DIASTOLIC BLOOD PRESSURE: 67 MMHG | OXYGEN SATURATION: 95 % | RESPIRATION RATE: 16 BRPM | TEMPERATURE: 98 F | BODY MASS INDEX: 47.09 KG/M2 | WEIGHT: 293 LBS | SYSTOLIC BLOOD PRESSURE: 133 MMHG | HEART RATE: 67 BPM

## 2022-01-13 DIAGNOSIS — Z12.31 ENCOUNTER FOR SCREENING MAMMOGRAM FOR MALIGNANT NEOPLASM OF BREAST: ICD-10-CM

## 2022-01-13 DIAGNOSIS — E66.01 MORBID OBESITY WITH BODY MASS INDEX (BMI) OF 60.0 TO 69.9 IN ADULT: ICD-10-CM

## 2022-01-13 DIAGNOSIS — Z80.3 FAMILY HISTORY OF BREAST CANCER: ICD-10-CM

## 2022-01-13 DIAGNOSIS — Z91.89 AT HIGH RISK FOR BREAST CANCER: Primary | ICD-10-CM

## 2022-01-13 DIAGNOSIS — Z91.89 AT HIGH RISK FOR BREAST CANCER: ICD-10-CM

## 2022-01-13 PROCEDURE — 99999 PR PBB SHADOW E&M-EST. PATIENT-LVL V: CPT | Mod: PBBFAC,,, | Performed by: NURSE PRACTITIONER

## 2022-01-13 PROCEDURE — 77063 BREAST TOMOSYNTHESIS BI: CPT | Mod: 26,,, | Performed by: RADIOLOGY

## 2022-01-13 PROCEDURE — 99999 PR PBB SHADOW E&M-EST. PATIENT-LVL V: ICD-10-PCS | Mod: PBBFAC,,, | Performed by: NURSE PRACTITIONER

## 2022-01-13 PROCEDURE — 77063 MAMMO DIGITAL SCREENING BILAT WITH TOMO: ICD-10-PCS | Mod: 26,,, | Performed by: RADIOLOGY

## 2022-01-13 PROCEDURE — 99214 PR OFFICE/OUTPT VISIT, EST, LEVL IV, 30-39 MIN: ICD-10-PCS | Mod: S$PBB,,, | Performed by: NURSE PRACTITIONER

## 2022-01-13 PROCEDURE — 77067 SCR MAMMO BI INCL CAD: CPT | Mod: 26,,, | Performed by: RADIOLOGY

## 2022-01-13 PROCEDURE — 77067 MAMMO DIGITAL SCREENING BILAT WITH TOMO: ICD-10-PCS | Mod: 26,,, | Performed by: RADIOLOGY

## 2022-01-13 PROCEDURE — 99214 OFFICE O/P EST MOD 30 MIN: CPT | Mod: S$PBB,,, | Performed by: NURSE PRACTITIONER

## 2022-01-13 PROCEDURE — 99215 OFFICE O/P EST HI 40 MIN: CPT | Mod: PBBFAC | Performed by: NURSE PRACTITIONER

## 2022-01-13 PROCEDURE — 77067 SCR MAMMO BI INCL CAD: CPT | Mod: TC

## 2022-01-13 NOTE — PROGRESS NOTES
Chief Complaint   Patient presents with    At high risk for breast cancer       HPI:   Joy Singer presents for follow up  of increased risk of breast cancer.   She was previously seen by Dr. Wall.     This follow up will consist of, but not limited to: reviewing her TC score, performing a CBE, and discussing various factors that determine high risk assessment.     Today, Feels good.   Had right nipple itching after ECG pads placed and tech pushed nipple. Now resolved.   Axilla soreness noted as well but more active as was taking care of  who had infection. He is doing better but requiring IV antibiotics.   No new pain issues other than her knees. She needs a knee replacement. Uses cane for short distances but requires a walker for longer distances.   attempting to lose weight but difficult.  No other issues or complaints    1/13/2022 MM[facility]:   Impression:   No mammographic evidence of malignancy.     BI-RADS Category 1: Negative     Recommendation:  Routine screening mammogram in 1 year is recommended.     Your estimated lifetime risk of breast cancer (to age 85) based on Tyrer-Cuzick risk assessment model is 11.52 %.  According to the American Cancer Society, patients with a lifetime breast cancer risk of 20% or higher might benefit from supplemental screening tests. ??         High Risk Breast cancer specific history:  - Height:  5'6  - Weight:  385 lbs  - Breast density per BI-RADS:  b - Scattered fibroglandular density  - Age at menarche:  13 yo  - Number of pregnancies: 2; age of first live birth: 28 yo  - History of breast feeding: Yes - 3 months  - Age at menopause, if applicable:  surgical at 50 yo  -Uterus and ovaries intact: S/p BSO at 50 y/o  - HRT: No  - Genetic testing: Yes - [employer] Common Hereditary Cancers Panel, heterozygous VUS identified in BRCA2, in 2019.  - Personal history of cancer: No  - Previous chest radiation exposure between ages 10-30 years old: No  - Personal history of  breast biopsy: Yes - Hx of mammotome biopsies with Dr. Seema Aguilar, several among both breasts, all benign per patient, pt states last was well over 2 years ago, pt has since transferred her breast-related care to Dr. Wall, pt does not recall ever being told she has LCIS or atypia/hyperplasia of the breast(s)  - Ashkenazi Restorationist Inheritance: No  - Family cancer history:   Family History   Problem Relation Age of Onset    Breast cancer Maternal Aunt           50s    Breast cancer Maternal Cousin 68         daughter of aunt dx'd in 50s    Breast cancer Maternal Cousin 64         mat 1st cousin, mother unaffected, myRisk with DorsaVI 2017 was negative ([place])    Cancer Paternal Grandmother           brain cancer    Breast cancer Maternal Aunt 68    Breast cancer Maternal Cousin           1st cousin, daughter of unaffected aunt    Breast cancer Maternal Cousin           mother's brother's daughter    Ovarian cancer Neg Hx        Social History:  Tobacco use:  denies  Alcohol use:  Very seldom  Swims, hasn't exercised in a while, plans to resume           Past Medical   Past Medical History:   Diagnosis Date    Breast cyst     Carotid artery occlusion     Chronic back pain     Chronic bilateral low back pain without sciatica 11/8/2016    Colon polyps 2016    Fibrocystic breast     HA (headache)     Hyperlipidemia     Morbid obesity with BMI of 60.0-69.9, adult     Obstructive sleep apnea (adult) (pediatric)     Pseudotumor cerebri      Patient Active Problem List   Diagnosis    Other hyperlipidemia    HTN (hypertension)    LIZETH (obstructive sleep apnea)    Idiopathic stabbing headache    Breast mass in female    Thrombocytopenia    Carpal tunnel syndrome    Degeneration of lumbar intervertebral disc    Empty sella syndrome    Disorder of sacroiliac joint    Migraine without aura, not refractory    Tibialis tendinitis    Leg swelling    Elevated blood sugar    Primary osteoarthritis  of right knee    Pseudotumor cerebri    Mild anxiety    Bilateral foot pain    Elevated hematocrit    Prediabetes    Morbid obesity with BMI of 60.0-69.9, adult    Erythrocytosis     Social History   Social History     Tobacco Use    Smoking status: Never Smoker    Smokeless tobacco: Never Used   Substance Use Topics    Alcohol use: Yes     Comment: social    Drug use: No     Family History  Family History   Problem Relation Age of Onset    Emphysema Mother     Lung cancer Mother     Hypertension Father     Heart failure Father     Diabetes Father     Emphysema Father     Heart failure Sister     Valvular heart disease Sister     Breast cancer Maternal Aunt         50s    Breast cancer Maternal Cousin 68        daughter of aunt dx'd in 50s    Breast cancer Maternal Cousin 64        mat 1st cousin, mother unaffected, myRisk with NEHP 2017 was negative    Cancer Paternal Grandmother         brain cancer    Breast cancer Maternal Aunt 68    Lung cancer Maternal Aunt     Lung cancer Maternal Uncle     Lung cancer Maternal Uncle     Lung cancer Maternal Uncle     Lung cancer Maternal Uncle         in addition to mesothelioma    Breast cancer Maternal Cousin         1st cousin, daughter of unaffected aunt    Breast cancer Maternal Cousin         mother's brother's daughter    Ovarian cancer Neg Hx      Medications    Current Outpatient Medications:     acetaZOLAMIDE (DIAMOX) 250 MG tablet, Take 250 mg by mouth 2 (two) times daily. , Disp: , Rfl:     ascorbic acid, vitamin C, 500 mg TbSR, Take by mouth once daily., Disp: , Rfl:     aspirin 325 MG tablet, Take 1 tablet by mouth every 6 to 8 hours as needed., Disp: , Rfl:     calcium carbonate-vitamin D3 600 mg(1,500mg) -500 unit Cap, Calcium 600 with Vitamin D3 600 mg (1,500 mg)-500 unit capsule  Take 1 capsule twice a day by oral route., Disp: , Rfl:     candesartan (ATACAND) 4 MG tablet, Take 1 tablet (4 mg total) by mouth every  evening., Disp: 90 tablet, Rfl: 0    carisoprodol (SOMA) 350 MG tablet, Take 350 mg by mouth continuous prn., Disp: , Rfl:     chlorthalidone (HYGROTEN) 25 MG Tab, Take 0.5 tablets (12.5 mg total) by mouth once daily., Disp: 90 tablet, Rfl: 3    clotrimazole-betamethasone 1-0.05% (LOTRISONE) cream, Apply topically 2 (two) times daily. To rash on buttocks, Disp: 15 g, Rfl: 1    diphenhydrAMINE (BENADRYL) 25 mg capsule, Take 25 mg by mouth every 6 (six) hours as needed for Itching., Disp: , Rfl:     docusate sodium (COLACE) 100 MG capsule, Colace 100 mg capsule  Take 2 capsule twice a day by oral route., Disp: , Rfl:     folic acid (FOLVITE) 400 MCG tablet, Take 400 mcg by mouth once daily., Disp: , Rfl:     hydrocodone-acetaminophen 7.5-325mg (NORCO) 7.5-325 mg per tablet, TK 1 T PO Q 6 TO 8 H PRN, Disp: , Rfl: 0    hydrocortisone (ANUSOL-HC) 2.5 % rectal cream, Place rectally 2 (two) times daily., Disp: 28 g, Rfl: 1    ketoconazole 2 % Foam, Extina 2 % topical foam as needed, Disp: , Rfl:     multivitamin-iron-folic acid (CENTRUM) Tab, Take 1 tablet by mouth once daily., Disp: , Rfl:     nystatin (MYCOSTATIN) powder, Apply topically 2 (two) times daily., Disp: 60 g, Rfl: 1    phenylephrine-guaifenesin (DECONEX IR)  mg Tab, Take 1 tablet by mouth as needed., Disp: , Rfl:     polyethylene glycol (GLYCOLAX) 17 gram/dose powder, polyethylene glycol 3350 17 gram/dose oral powder as needed, Disp: , Rfl:     potassium chloride SA (K-DUR,KLOR-CON) 10 MEQ tablet, TAKE 1 TABLET DAILY, Disp: 90 tablet, Rfl: 3    pravastatin (PRAVACHOL) 40 MG tablet, TAKE 1 TABLET DAILY, Disp: 90 tablet, Rfl: 3    traZODone (DESYREL) 50 MG tablet, TAKE 1 TABLET(50 MG) BY MOUTH EVERY EVENING, Disp: 90 tablet, Rfl: 1    vitamin E 1000 UNIT capsule, Take 1,000 Units by mouth once daily., Disp: , Rfl:   Allergies  Review of patient's allergies indicates:   Allergen Reactions    Insect venom Edema, Itching and Swelling     "Percocet [oxycodone-acetaminophen] Itching    Venom-honey bee     Unable to assess Rash     Insect Bites       Review of Systems       See above   All other systems reviewed and are negative.    Objective:      Vitals: refused BP on arrival. Recently start ed on antihypertensive and monitors at home.   Vitals:    01/13/22 1026   BP: 133/67   BP Location: Right arm   Patient Position: Sitting   BP Method: Large (Automatic)   Pulse: 67   Resp: 16   Temp: 98.2 °F (36.8 °C)   TempSrc: Oral   SpO2: 95%   Weight: (!) 173.6 kg (382 lb 11.5 oz)   Height: 5' 6" (1.676 m)     BMI: Body mass index is 61.77 kg/m².   Body surface area is 2.84 meters squared.    Physical Exam  Vitals signs reviewed.   Constitutional:  Normal appearance. NAD. Obese.   HENT: Normocephalic.   Eyes: Pupils are equal, round, and reactive to light.   Cardiovascular: Normal rate.   Pulmonary: Pulmonary effort is normal.   Musculoskeletal: Normal range of motion.   Lymphadenopathy: No cervical adenopathy. No axillary adenopathy.   Skin: Skin is warm and dry.   Neurological:  She is alert and oriented to person, place, and time.   Psychiatric: Mood normal.     Breast Exam: Bilateral breast normal. No masses, no tenderness, no skin or nipple abnormalities.  No lymphadenopathy.  Exam done while sitting in chair as unable to to get on exam table.       Laboratory Data: reviewed most recent       Imaging: reviewed most recent          Assessment:     1. At high risk for breast cancer    2. Family history of breast cancer    3. Morbid obesity with body mass index (BMI) of 60.0 to 69.9 in adult    4. Encounter for screening mammogram for malignant neoplasm of breast             Plan:       1. Patient elects to proceed with alternating annual mammogram and annual breast US along with semiannual CBEs.   2. She is a previous patient of Dr. Wall and she wishes to continue f/u with me yearly.   3. Lifestyle modifications as previously discussed  4.   Encouraged " breast awareness, including monthly breast self-exams.   5.   Opts out of further Genetic counseling.   6.   We discussed referral to [facility] for weight loss buts opts out at this time. She will call if she wishes to pursue in future.     Reassurance given    RTC in January 2023 to see me either at [facility] or on 3rd floor with  MMG.   Breast US due in July 2022.      Questions were encouraged and answered to patient's satisfaction, and patient verbalized understanding of information and agreement with the plan. Advised patient to RTC with any interval changes or concerns.      Patient is in agreement with the proposed treatment plan. All questions were answered to the patient's satisfaction. Pt knows to call clinic for any new or worsening symptoms and if anything is needed before the next clinic visit.      JOSE GilliamP-C  Hematology & Oncology  [address] [place] [id number]  ph. [phone]  Fax. [phone]     Face to Face time with patient: 25 minutes  35 minutes of total time spent on the encounter, which includes face to face time and non-face to face time preparing to see the patient (eg, review of tests), Obtaining and/or reviewing separately obtained history, Documenting clinical information in the electronic or other health record, Independently interpreting results (not separately reported) and communicating results to the patient/family/caregiver, or Care coordination (not separately reported).

## 2022-01-13 NOTE — Clinical Note
Breast US due in July 2022. RTC in January 2023 to see me either at [facility] or on 3rd floor with  MM[facility].

## 2022-02-08 ENCOUNTER — PES CALL (OUTPATIENT)
Dept: ADMINISTRATIVE | Facility: CLINIC | Age: 67
End: 2022-02-08
Payer: MEDICARE

## 2022-02-13 PROBLEM — E78.00 PURE HYPERCHOLESTEROLEMIA: Status: ACTIVE | Noted: 2022-02-13

## 2022-02-13 PROBLEM — E87.6 HYPOKALEMIA: Status: ACTIVE | Noted: 2022-02-13

## 2022-02-13 RX ORDER — POTASSIUM CHLORIDE 750 MG/1
20 TABLET, EXTENDED RELEASE ORAL DAILY
Qty: 180 TABLET | Refills: 3 | Status: SHIPPED | OUTPATIENT
Start: 2022-02-13 | End: 2022-03-25 | Stop reason: SDUPTHER

## 2022-02-13 NOTE — ASSESSMENT & PLAN NOTE
LDL <100, but HDL is <40 mg/dl. Continue same dose of pravastatin.   Cholesterol education. Recommended a healthy diet that emphasizes the intake of vegetables, fruits, nuts, whole grains, vegetable or lean animal protein and fish and minimizes the intake of trans fats, saturated fats, red meat and processed red meats, refined carbohydrates and sweetened beverages.

## 2022-02-14 ENCOUNTER — TELEPHONE (OUTPATIENT)
Dept: CARDIOLOGY | Facility: CLINIC | Age: 67
End: 2022-02-14
Payer: MEDICARE

## 2022-02-14 ENCOUNTER — PATIENT MESSAGE (OUTPATIENT)
Dept: RESEARCH | Facility: HOSPITAL | Age: 67
End: 2022-02-14
Payer: MEDICARE

## 2022-02-14 NOTE — TELEPHONE ENCOUNTER
Pt notified, states she is not seeing . Pt states she is seeing , \[facility]\"" Ophthalmologist who monitors pt's eye pressures. Pt states she is not having any headaches. Pt asking if she still needs to follow with Neurology. Please advise.

## 2022-02-14 NOTE — TELEPHONE ENCOUNTER
----- Message from Marlene Mccallum MD sent at 2/13/2022 12:43 PM CST -----  Pl tell pt that I would like her to start taking 2 potassium tabs a day, either 1 tab twice a day or both tabs at the same time.  Also does she still see Dr. Vincent? She was the neurologist for high intracranial pressure (pressures in the brain). If not she needs to see someone at some point - nothing urgent or abnormal, I was just going thru her chart and saw that she had not had a fup w Neurology. Pl loop in Dr. Kuhn when you send her reply to me.  Thanks

## 2022-02-18 ENCOUNTER — PATIENT OUTREACH (OUTPATIENT)
Dept: ADMINISTRATIVE | Facility: HOSPITAL | Age: 67
End: 2022-02-18
Payer: MEDICARE

## 2022-03-02 ENCOUNTER — TELEPHONE (OUTPATIENT)
Dept: CARDIOLOGY | Facility: CLINIC | Age: 67
End: 2022-03-02
Payer: MEDICARE

## 2022-03-02 NOTE — TELEPHONE ENCOUNTER
----- Message from Geovanna Bangura MA sent at 3/2/2022 12:28 PM CST -----  Bambi please call Erasmo from Dr. Brandy Vincent office from Neurology she need to talk to you about this patient please call [phone]. Thank you.

## 2022-03-02 NOTE — TELEPHONE ENCOUNTER
Dr. Vincent office called asking why pt needs to be seen because pt stated that she is not having any problems. I advised Dr. Vincent office that Dr. Mccallum stated that she should f/u w/Neurology for pressure on the brain. Dr. Vincent office verbalized understanding and stated that they will contact pt to schedule.

## 2022-03-09 DIAGNOSIS — G47.00 INSOMNIA, UNSPECIFIED TYPE: ICD-10-CM

## 2022-03-09 RX ORDER — TRAZODONE HYDROCHLORIDE 50 MG/1
50 TABLET ORAL NIGHTLY
Qty: 90 TABLET | Refills: 0 | Status: SHIPPED | OUTPATIENT
Start: 2022-03-09 | End: 2022-06-13

## 2022-03-21 ENCOUNTER — TELEPHONE (OUTPATIENT)
Dept: INTERNAL MEDICINE | Facility: CLINIC | Age: 67
End: 2022-03-21
Payer: MEDICARE

## 2022-03-21 DIAGNOSIS — R30.0 DYSURIA: Primary | ICD-10-CM

## 2022-03-21 NOTE — TELEPHONE ENCOUNTER
----- Message from Daphne Engel sent at 3/21/2022  9:35 AM CDT -----  Contact: [phone] Patient  Patient would like to get medical advice.  Symptoms (please be specific):  UTI with a lot of burning  How long have you had these symptoms: this morning  Would you like a call back, or a response through your [facility] portal?:   call back  Pharmacy name and phone # (copy from chart):      [id number] - [place] - [address]  AT [facility] [address]  [address] [place] [address]  Phone: [phone] Fax: [phone]     Comments:  Pt is also requesting urine lab orders. Pt states she wants to come in to leave a sample.

## 2022-03-23 ENCOUNTER — TELEPHONE (OUTPATIENT)
Dept: SLEEP MEDICINE | Facility: CLINIC | Age: 67
End: 2022-03-23
Payer: MEDICARE

## 2022-03-24 ENCOUNTER — PATIENT MESSAGE (OUTPATIENT)
Dept: CARDIOLOGY | Facility: CLINIC | Age: 67
End: 2022-03-24
Payer: MEDICARE

## 2022-03-25 DIAGNOSIS — E87.6 HYPOKALEMIA: ICD-10-CM

## 2022-03-25 RX ORDER — POTASSIUM CHLORIDE 750 MG/1
20 TABLET, EXTENDED RELEASE ORAL DAILY
Qty: 180 TABLET | Refills: 3 | Status: SHIPPED | OUTPATIENT
Start: 2022-03-25 | End: 2023-04-04

## 2022-04-01 ENCOUNTER — TELEPHONE (OUTPATIENT)
Dept: INTERNAL MEDICINE | Facility: CLINIC | Age: 67
End: 2022-04-01
Payer: MEDICARE

## 2022-04-01 ENCOUNTER — LAB VISIT (OUTPATIENT)
Dept: LAB | Facility: HOSPITAL | Age: 67
End: 2022-04-01
Attending: INTERNAL MEDICINE
Payer: MEDICARE

## 2022-04-01 DIAGNOSIS — R30.0 DYSURIA: ICD-10-CM

## 2022-04-01 LAB
BACTERIA #/AREA URNS AUTO: ABNORMAL /HPF
BILIRUB UR QL STRIP: NEGATIVE
CAOX CRY UR QL COMP ASSIST: ABNORMAL
CLARITY UR REFRACT.AUTO: ABNORMAL
COLOR UR AUTO: YELLOW
GLUCOSE UR QL STRIP: NEGATIVE
HGB UR QL STRIP: NEGATIVE
HYALINE CASTS UR QL AUTO: 0 /LPF
KETONES UR QL STRIP: NEGATIVE
LEUKOCYTE ESTERASE UR QL STRIP: ABNORMAL
MICROSCOPIC COMMENT: ABNORMAL
NITRITE UR QL STRIP: NEGATIVE
PH UR STRIP: 7 [PH] (ref 5–8)
PROT UR QL STRIP: ABNORMAL
RBC #/AREA URNS AUTO: 4 /HPF (ref 0–4)
SP GR UR STRIP: 1.02 (ref 1–1.03)
SQUAMOUS #/AREA URNS AUTO: 8 /HPF
URN SPEC COLLECT METH UR: ABNORMAL
WBC #/AREA URNS AUTO: >100 /HPF (ref 0–5)
WBC CLUMPS UR QL AUTO: ABNORMAL

## 2022-04-01 PROCEDURE — 87186 SC STD MICRODIL/AGAR DIL: CPT | Performed by: INTERNAL MEDICINE

## 2022-04-01 PROCEDURE — 87077 CULTURE AEROBIC IDENTIFY: CPT | Performed by: INTERNAL MEDICINE

## 2022-04-01 PROCEDURE — 87088 URINE BACTERIA CULTURE: CPT | Performed by: INTERNAL MEDICINE

## 2022-04-01 PROCEDURE — 81001 URINALYSIS AUTO W/SCOPE: CPT | Performed by: INTERNAL MEDICINE

## 2022-04-01 PROCEDURE — 87086 URINE CULTURE/COLONY COUNT: CPT | Performed by: INTERNAL MEDICINE

## 2022-04-01 RX ORDER — NITROFURANTOIN 25; 75 MG/1; MG/1
100 CAPSULE ORAL 2 TIMES DAILY
Qty: 10 CAPSULE | Refills: 0 | Status: SHIPPED | OUTPATIENT
Start: 2022-04-01 | End: 2022-04-01 | Stop reason: SDUPTHER

## 2022-04-01 RX ORDER — NITROFURANTOIN 25; 75 MG/1; MG/1
100 CAPSULE ORAL 2 TIMES DAILY
Qty: 10 CAPSULE | Refills: 0 | Status: SHIPPED | OUTPATIENT
Start: 2022-04-01 | End: 2022-04-06

## 2022-04-01 NOTE — TELEPHONE ENCOUNTER
----- Message from Maribel Elizondo sent at 4/1/2022  4:15 PM CDT -----  Contact: pt [phone]  Pt calling to see if something is going to be called in for her UTI.       [id number] [place]  [address]  AT [facility] [address]  [address] [place] [address]  Phone: [phone] Fax: [phone]      Please call and advise

## 2022-04-01 NOTE — TELEPHONE ENCOUNTER
----- Message from April Morataya sent at 4/1/2022  2:52 PM CDT -----  Contact: self [phone]  Calling to get test results.  Name of test (lab, x-ray): UA  Date of test: 4/1/2022  Where was the test performed: [facility]  Would you like a call back, or a response through your [facility] portal?:   phone  Comments:

## 2022-04-04 LAB — BACTERIA UR CULT: ABNORMAL

## 2022-04-11 ENCOUNTER — TELEPHONE (OUTPATIENT)
Dept: INTERNAL MEDICINE | Facility: CLINIC | Age: 67
End: 2022-04-11
Payer: MEDICARE

## 2022-04-11 DIAGNOSIS — Z87.440 RECENT URINARY TRACT INFECTION: Primary | ICD-10-CM

## 2022-04-11 DIAGNOSIS — R73.03 PREDIABETES: ICD-10-CM

## 2022-04-11 NOTE — TELEPHONE ENCOUNTER
Have the symptoms of the urinary tract infection improved?    Based on the urine culture results, the prescribed antibiotic may not always work. Recommend repeat urine culture as soon as she can.

## 2022-04-18 ENCOUNTER — TELEPHONE (OUTPATIENT)
Dept: INTERNAL MEDICINE | Facility: CLINIC | Age: 67
End: 2022-04-18
Payer: MEDICARE

## 2022-04-18 NOTE — TELEPHONE ENCOUNTER
Pt called.    She collected urine sample on Thursday, refrigerated sample and brought to lab on Friday, lab closed.    New cup and copy of order placed on black cabinet for pt to

## 2022-04-19 ENCOUNTER — PATIENT MESSAGE (OUTPATIENT)
Dept: INTERNAL MEDICINE | Facility: CLINIC | Age: 67
End: 2022-04-19
Payer: MEDICARE

## 2022-04-19 ENCOUNTER — LAB VISIT (OUTPATIENT)
Dept: LAB | Facility: HOSPITAL | Age: 67
End: 2022-04-19
Attending: INTERNAL MEDICINE
Payer: MEDICARE

## 2022-04-19 DIAGNOSIS — Z87.440 RECENT URINARY TRACT INFECTION: ICD-10-CM

## 2022-04-19 DIAGNOSIS — R73.03 PREDIABETES: ICD-10-CM

## 2022-04-19 PROCEDURE — 87186 SC STD MICRODIL/AGAR DIL: CPT | Performed by: INTERNAL MEDICINE

## 2022-04-19 PROCEDURE — 87088 URINE BACTERIA CULTURE: CPT | Performed by: INTERNAL MEDICINE

## 2022-04-19 PROCEDURE — 87077 CULTURE AEROBIC IDENTIFY: CPT | Performed by: INTERNAL MEDICINE

## 2022-04-19 PROCEDURE — 87086 URINE CULTURE/COLONY COUNT: CPT | Performed by: INTERNAL MEDICINE

## 2022-04-20 ENCOUNTER — TELEPHONE (OUTPATIENT)
Dept: SLEEP MEDICINE | Facility: CLINIC | Age: 67
End: 2022-04-20
Payer: MEDICARE

## 2022-04-20 ENCOUNTER — PATIENT MESSAGE (OUTPATIENT)
Dept: INTERNAL MEDICINE | Facility: CLINIC | Age: 67
End: 2022-04-20
Payer: MEDICARE

## 2022-04-20 RX ORDER — SULFAMETHOXAZOLE AND TRIMETHOPRIM 800; 160 MG/1; MG/1
1 TABLET ORAL 2 TIMES DAILY
Qty: 10 TABLET | Refills: 0 | Status: SHIPPED | OUTPATIENT
Start: 2022-04-20 | End: 2022-04-25

## 2022-04-20 RX ORDER — SULFAMETHOXAZOLE AND TRIMETHOPRIM 800; 160 MG/1; MG/1
1 TABLET ORAL 2 TIMES DAILY
Qty: 10 TABLET | Refills: 0 | Status: SHIPPED | OUTPATIENT
Start: 2022-04-20 | End: 2022-04-20 | Stop reason: SDUPTHER

## 2022-04-20 NOTE — TELEPHONE ENCOUNTER
Bactrim ds bid x5 days should work. Will change medication if needed after culture results are available

## 2022-04-21 LAB — BACTERIA UR CULT: ABNORMAL

## 2022-04-22 ENCOUNTER — PATIENT MESSAGE (OUTPATIENT)
Dept: INTERNAL MEDICINE | Facility: CLINIC | Age: 67
End: 2022-04-22
Payer: MEDICARE

## 2022-04-22 NOTE — TELEPHONE ENCOUNTER
The culture also shows that the bacteria is sensitive to Bactrim (trimeth/sulfa).     Gentamicin and ceftriaxone are only available as an injection.     Ciprofloxacin can be used but it could interact with candesartan and cause heart rhythm abnormalities so she would need to be monitored very closely.    Nitrofurantoin only has antibacterial action in the bladder and does not work as well for Proteus bacteria as other medications.    So I gave her Bactrim and Proteus is sensitive to Bactrim and she should start taking it right away.    She should drink lots of water with Bactrim. If she notices any adverse effects (stomach pain, nausea, diarrhea), then we can change to the next best option.

## 2022-04-25 ENCOUNTER — PATIENT MESSAGE (OUTPATIENT)
Dept: NEUROLOGY | Facility: CLINIC | Age: 67
End: 2022-04-25
Payer: MEDICARE

## 2022-05-16 ENCOUNTER — PATIENT OUTREACH (OUTPATIENT)
Dept: ADMINISTRATIVE | Facility: OTHER | Age: 67
End: 2022-05-16
Payer: MEDICARE

## 2022-05-16 NOTE — PROGRESS NOTES
Care Everywhere: updated  Immunization: updated  Health Maintenance: updated  Media Review:   Legacy Review:   DIS:  Order placed:   Upcoming appts:  EFAX:  Task Tickets:Scheduling ticket to schedule annual pcp visit sent to patient's portal on 3.9.2022  Referrals:

## 2022-05-17 ENCOUNTER — PATIENT MESSAGE (OUTPATIENT)
Dept: SLEEP MEDICINE | Facility: CLINIC | Age: 67
End: 2022-05-17

## 2022-05-17 ENCOUNTER — OFFICE VISIT (OUTPATIENT)
Dept: SLEEP MEDICINE | Facility: CLINIC | Age: 67
End: 2022-05-17
Payer: MEDICARE

## 2022-05-17 DIAGNOSIS — G47.33 OSA (OBSTRUCTIVE SLEEP APNEA): Primary | ICD-10-CM

## 2022-05-17 PROCEDURE — 99213 PR OFFICE/OUTPT VISIT, EST, LEVL III, 20-29 MIN: ICD-10-PCS | Mod: 95,,, | Performed by: NURSE PRACTITIONER

## 2022-05-17 PROCEDURE — 99213 OFFICE O/P EST LOW 20 MIN: CPT | Mod: 95,,, | Performed by: NURSE PRACTITIONER

## 2022-05-17 NOTE — PROGRESS NOTES
This 66 y.o. female patient returns for the management of obstructive sleep apnea    Continues to use wisp mask XL w/o chin strap. Denies oral drying. Sleeps with non-custom mouth guard. Machine is old/outdated.  Using therapy qhs. Gettting regular supplies. Having still pressure intolerance, never could get autobipap without having bipap study/can't do. Hard to exhale against pressure    Interrogation- avg use 7:18h/night. AHI 0.5, 90 %tile 16.7cm      HISTORY  [place]- 7/31/18  She continues to use apap 14-20cm nightly. Continued improvement of snoring and headaches. Still disrupted sleep-->nocturia q2h attributes to pain. Takes 50% of the time norco qhs, tries to take ASA instead. Prn soma use (back/knee/wrist pains). Getting regular supplies. LOST 26#. Hasn't been swimming in over year due to recurrent ear infection. Continued dry ears and teeth clenching. Using 90% tile 17cm with WISP nasal mask. No chin strap. Denies bloating/belching. Hard to exhale though against pressure at times. Current chest cold symptoms.   Interrogation- 30d avg 8:42h/n.n AHI 0.2, 90% tile 16-17cm. 0-3% leak. 30/30d>4h. 0% periodic  BP suboptimal today  SIDE sleeper  ESS=2      PRIOR SLEEP HISTORY [place]:   reports that she has intermittent breathing interruption in her sleep  Snoring.  Wakes up with dry mouth - which gives headaches  Bathroom every 2 hours.  Groggy in morning  Morning headaches after mouth breathing  ESS = 3  Denies limb kicking or RLS.  Left arm pain - contributes to sleep disturbances  Soma and Vicodin for back pain; knee pain    1/2018:   The patient stated her previous DME does not accept her insurance. She is requesting for an order and her insurance to be sent to the DME provided below. She already confirmed that they accept her insurance. ESS=3    [facility]   [phone]    Patient using CPAP nightly. She reports sleeping longer with CPAP  Snoring cessation  Some teeth  grinding, some lingual protrusion at night, which is causing lower teeth shift per her dentist-  Using a Wisp mask, straps adjustment/fixing it or leaks at times      Home Sleep Study: 12/11/14: +LIZETH, AHI 15, %time <90% = 9.9%      ASSESSMENT:    1. Obstructive Sleep Apnea, moderate . Symptoms remain improved, benefitsfrom therapy. AHI<5. Excellent adherence , having ongoing pressure intolerance/pressure too high, unable to have bipap titration study. Machine old/outdated, eligible new machine  Medical comorbidiies- morbid obesity, HTN    PLAN:  1. Continue autoPAP 14 - 20 cm, switch to [facility] new machine/supplies ongoing. rtc 4 wks after setup adherence   2.Discussed effectiveness of therapy

## 2022-05-30 ENCOUNTER — PATIENT MESSAGE (OUTPATIENT)
Dept: INTERNAL MEDICINE | Facility: CLINIC | Age: 67
End: 2022-05-30
Payer: MEDICARE

## 2022-05-30 DIAGNOSIS — R73.03 PREDIABETES: Primary | ICD-10-CM

## 2022-05-30 DIAGNOSIS — Z00.00 ANNUAL PHYSICAL EXAM: ICD-10-CM

## 2022-05-30 DIAGNOSIS — R73.03 PREDIABETES: ICD-10-CM

## 2022-05-30 DIAGNOSIS — I10 PRIMARY HYPERTENSION: ICD-10-CM

## 2022-05-30 DIAGNOSIS — R31.0 GROSS HEMATURIA: Primary | ICD-10-CM

## 2022-05-30 NOTE — TELEPHONE ENCOUNTER
Pt states she can come in for labs on Wednesday    CMP and A1c pending.    I checked the chart, don't see any issues of labs not being scheduled.

## 2022-05-30 NOTE — TELEPHONE ENCOUNTER
Entered UA and culture.     She is very overdue for annual exam.     Last HbA1c indicated borderline-diabetes and she has not had labs in >1 year. If blood sugar levels have increased, then that could be a reason for frequent infection. Also need to know kidney function before giving some of the antibiotics.     Does she want to check annual labs and arrange an appointment?

## 2022-06-01 ENCOUNTER — LAB VISIT (OUTPATIENT)
Dept: LAB | Facility: HOSPITAL | Age: 67
End: 2022-06-01
Attending: INTERNAL MEDICINE
Payer: MEDICARE

## 2022-06-01 DIAGNOSIS — Z00.00 ANNUAL PHYSICAL EXAM: ICD-10-CM

## 2022-06-01 DIAGNOSIS — I10 PRIMARY HYPERTENSION: ICD-10-CM

## 2022-06-01 DIAGNOSIS — R73.03 PREDIABETES: ICD-10-CM

## 2022-06-01 LAB
ALBUMIN SERPL BCP-MCNC: 3.5 G/DL (ref 3.5–5.2)
ALP SERPL-CCNC: 64 U/L (ref 55–135)
ALT SERPL W/O P-5'-P-CCNC: 12 U/L (ref 10–44)
ANION GAP SERPL CALC-SCNC: 8 MMOL/L (ref 8–16)
AST SERPL-CCNC: 14 U/L (ref 10–40)
BASOPHILS # BLD AUTO: 0.07 K/UL (ref 0–0.2)
BASOPHILS NFR BLD: 0.9 % (ref 0–1.9)
BILIRUB SERPL-MCNC: 0.5 MG/DL (ref 0.1–1)
BUN SERPL-MCNC: 18 MG/DL (ref 8–23)
CALCIUM SERPL-MCNC: 9.4 MG/DL (ref 8.7–10.5)
CHLORIDE SERPL-SCNC: 111 MMOL/L (ref 95–110)
CO2 SERPL-SCNC: 22 MMOL/L (ref 23–29)
CREAT SERPL-MCNC: 0.9 MG/DL (ref 0.5–1.4)
DIFFERENTIAL METHOD: ABNORMAL
EOSINOPHIL # BLD AUTO: 0.2 K/UL (ref 0–0.5)
EOSINOPHIL NFR BLD: 2.5 % (ref 0–8)
ERYTHROCYTE [DISTWIDTH] IN BLOOD BY AUTOMATED COUNT: 15 % (ref 11.5–14.5)
EST. GFR  (AFRICAN AMERICAN): >60 ML/MIN/1.73 M^2
EST. GFR  (NON AFRICAN AMERICAN): >60 ML/MIN/1.73 M^2
ESTIMATED AVG GLUCOSE: 114 MG/DL (ref 68–131)
GLUCOSE SERPL-MCNC: 105 MG/DL (ref 70–110)
HBA1C MFR BLD: 5.6 % (ref 4–5.6)
HCT VFR BLD AUTO: 45.9 % (ref 37–48.5)
HGB BLD-MCNC: 14.4 G/DL (ref 12–16)
IMM GRANULOCYTES # BLD AUTO: 0.02 K/UL (ref 0–0.04)
IMM GRANULOCYTES NFR BLD AUTO: 0.3 % (ref 0–0.5)
LYMPHOCYTES # BLD AUTO: 2.4 K/UL (ref 1–4.8)
LYMPHOCYTES NFR BLD: 31.4 % (ref 18–48)
MCH RBC QN AUTO: 26.1 PG (ref 27–31)
MCHC RBC AUTO-ENTMCNC: 31.4 G/DL (ref 32–36)
MCV RBC AUTO: 83 FL (ref 82–98)
MONOCYTES # BLD AUTO: 0.7 K/UL (ref 0.3–1)
MONOCYTES NFR BLD: 8.7 % (ref 4–15)
NEUTROPHILS # BLD AUTO: 4.2 K/UL (ref 1.8–7.7)
NEUTROPHILS NFR BLD: 56.2 % (ref 38–73)
NRBC BLD-RTO: 0 /100 WBC
PLATELET # BLD AUTO: 145 K/UL (ref 150–450)
PMV BLD AUTO: 11.5 FL (ref 9.2–12.9)
POTASSIUM SERPL-SCNC: 3.9 MMOL/L (ref 3.5–5.1)
PROT SERPL-MCNC: 6.6 G/DL (ref 6–8.4)
RBC # BLD AUTO: 5.52 M/UL (ref 4–5.4)
SODIUM SERPL-SCNC: 141 MMOL/L (ref 136–145)
TSH SERPL DL<=0.005 MIU/L-ACNC: 2.81 UIU/ML (ref 0.4–4)
WBC # BLD AUTO: 7.51 K/UL (ref 3.9–12.7)

## 2022-06-01 PROCEDURE — 84443 ASSAY THYROID STIM HORMONE: CPT | Performed by: INTERNAL MEDICINE

## 2022-06-01 PROCEDURE — 80053 COMPREHEN METABOLIC PANEL: CPT | Performed by: INTERNAL MEDICINE

## 2022-06-01 PROCEDURE — 85025 COMPLETE CBC W/AUTO DIFF WBC: CPT | Performed by: INTERNAL MEDICINE

## 2022-06-01 PROCEDURE — 83036 HEMOGLOBIN GLYCOSYLATED A1C: CPT | Performed by: INTERNAL MEDICINE

## 2022-06-01 PROCEDURE — 36415 COLL VENOUS BLD VENIPUNCTURE: CPT | Mod: PO | Performed by: INTERNAL MEDICINE

## 2022-06-02 ENCOUNTER — TELEPHONE (OUTPATIENT)
Dept: INTERNAL MEDICINE | Facility: CLINIC | Age: 67
End: 2022-06-02
Payer: MEDICARE

## 2022-06-02 RX ORDER — CIPROFLOXACIN 500 MG/1
500 TABLET ORAL 2 TIMES DAILY
Qty: 10 TABLET | Refills: 0 | Status: SHIPPED | OUTPATIENT
Start: 2022-06-02 | End: 2022-06-07

## 2022-06-02 NOTE — TELEPHONE ENCOUNTER
----- Message from Faiza Kuhn MD sent at 6/2/2022  2:37 PM CDT -----  The urinalysis is positive again. Will give a different antibiotic this time. Start Cipro 500mg twice daily for 5 days.

## 2022-06-12 DIAGNOSIS — G47.00 INSOMNIA, UNSPECIFIED TYPE: ICD-10-CM

## 2022-06-12 NOTE — TELEPHONE ENCOUNTER
No new care gaps identified.  [facility] Embedded Care Gaps. Reference number: [id number]. 6/12/2022   11:13:25 AM CDT

## 2022-06-13 RX ORDER — TRAZODONE HYDROCHLORIDE 50 MG/1
TABLET ORAL
Qty: 90 TABLET | Refills: 0 | Status: SHIPPED | OUTPATIENT
Start: 2022-06-13 | End: 2022-09-13

## 2022-06-13 NOTE — TELEPHONE ENCOUNTER
Refill Routing Note   Medication(s) are not appropriate for processing by [facility] for the following reason(s):      - Indication is outside of scope for [facility]    [facility] action(s):  Route          Medication reconciliation completed: No     Appointments  past 12m or future 3m with PCP    Date Provider   Last Visit   3/22/2021 Faiza Kuhn MD   Next Visit   6/22/2022 Faiza Kuhn MD   ED visits in past 90 days: 0        Note composed:2:19 PM 06/13/2022

## 2022-06-19 ENCOUNTER — PATIENT MESSAGE (OUTPATIENT)
Dept: INTERNAL MEDICINE | Facility: CLINIC | Age: 67
End: 2022-06-19
Payer: MEDICARE

## 2022-06-19 DIAGNOSIS — R30.0 DYSURIA: ICD-10-CM

## 2022-06-19 DIAGNOSIS — R35.0 URINARY FREQUENCY: Primary | ICD-10-CM

## 2022-06-20 ENCOUNTER — PATIENT MESSAGE (OUTPATIENT)
Dept: UROLOGY | Facility: CLINIC | Age: 67
End: 2022-06-20
Payer: MEDICARE

## 2022-06-20 ENCOUNTER — LAB VISIT (OUTPATIENT)
Dept: LAB | Facility: HOSPITAL | Age: 67
End: 2022-06-20
Attending: INTERNAL MEDICINE
Payer: MEDICARE

## 2022-06-20 DIAGNOSIS — R35.0 URINARY FREQUENCY: ICD-10-CM

## 2022-06-20 DIAGNOSIS — R30.0 DYSURIA: ICD-10-CM

## 2022-06-20 PROBLEM — R31.0 GROSS HEMATURIA: Status: ACTIVE | Noted: 2022-06-20

## 2022-06-20 LAB
BACTERIA #/AREA URNS AUTO: ABNORMAL /HPF
BILIRUB UR QL STRIP: NEGATIVE
CAOX CRY UR QL COMP ASSIST: ABNORMAL
CLARITY UR REFRACT.AUTO: ABNORMAL
COLOR UR AUTO: YELLOW
GLUCOSE UR QL STRIP: NEGATIVE
HGB UR QL STRIP: ABNORMAL
KETONES UR QL STRIP: NEGATIVE
LEUKOCYTE ESTERASE UR QL STRIP: NEGATIVE
MICROSCOPIC COMMENT: ABNORMAL
NITRITE UR QL STRIP: NEGATIVE
PH UR STRIP: 5 [PH] (ref 5–8)
PROT UR QL STRIP: NEGATIVE
RBC #/AREA URNS AUTO: >100 /HPF (ref 0–4)
SP GR UR STRIP: 1.02 (ref 1–1.03)
SQUAMOUS #/AREA URNS AUTO: 6 /HPF
URN SPEC COLLECT METH UR: ABNORMAL
WBC #/AREA URNS AUTO: 4 /HPF (ref 0–5)

## 2022-06-20 PROCEDURE — 81001 URINALYSIS AUTO W/SCOPE: CPT | Performed by: INTERNAL MEDICINE

## 2022-06-20 PROCEDURE — 87086 URINE CULTURE/COLONY COUNT: CPT | Performed by: INTERNAL MEDICINE

## 2022-06-21 ENCOUNTER — OFFICE VISIT (OUTPATIENT)
Dept: UROLOGY | Facility: CLINIC | Age: 67
End: 2022-06-21
Payer: MEDICARE

## 2022-06-21 VITALS
DIASTOLIC BLOOD PRESSURE: 84 MMHG | WEIGHT: 293 LBS | BODY MASS INDEX: 47.09 KG/M2 | HEIGHT: 66 IN | SYSTOLIC BLOOD PRESSURE: 130 MMHG | HEART RATE: 64 BPM

## 2022-06-21 DIAGNOSIS — R30.0 DYSURIA: Primary | ICD-10-CM

## 2022-06-21 DIAGNOSIS — N39.0 RECURRENT UTI: ICD-10-CM

## 2022-06-21 DIAGNOSIS — N32.81 OAB (OVERACTIVE BLADDER): ICD-10-CM

## 2022-06-21 PROCEDURE — 99215 OFFICE O/P EST HI 40 MIN: CPT | Mod: PBBFAC,PN | Performed by: NURSE PRACTITIONER

## 2022-06-21 PROCEDURE — 99999 PR PBB SHADOW E&M-EST. PATIENT-LVL V: CPT | Mod: PBBFAC,,, | Performed by: NURSE PRACTITIONER

## 2022-06-21 PROCEDURE — 99214 PR OFFICE/OUTPT VISIT, EST, LEVL IV, 30-39 MIN: ICD-10-PCS | Mod: S$PBB,,, | Performed by: NURSE PRACTITIONER

## 2022-06-21 PROCEDURE — 99999 PR PBB SHADOW E&M-EST. PATIENT-LVL V: ICD-10-PCS | Mod: PBBFAC,,, | Performed by: NURSE PRACTITIONER

## 2022-06-21 PROCEDURE — 99214 OFFICE O/P EST MOD 30 MIN: CPT | Mod: S$PBB,,, | Performed by: NURSE PRACTITIONER

## 2022-06-21 RX ORDER — SOLIFENACIN SUCCINATE 10 MG/1
10 TABLET, FILM COATED ORAL DAILY
Qty: 30 TABLET | Refills: 11 | Status: SHIPPED | OUTPATIENT
Start: 2022-06-21 | End: 2022-07-07

## 2022-06-21 NOTE — PROGRESS NOTES
"Subjective:   Joy Singer is a 66 y.o. female who presents for evaluation of Inez      Recurrent UTIs  Patient complains of recurrent urinary tract infections.  She reports 3 reoccurring UTIs from April to May; all culture proven revealing proteus    She describes associated symptoms during these episodes as hematuria, burning with urination, frequency, incontinence and urgency.  She denies associated fever and flank pain.  She reports that symptoms do not improve with antibiotic treatment, which have included Bactrim, Nitrofurantoin and cipro.  The timing of her infections is not related to intercourse.  Other urologic history includes bladder sling in 2004. She also reports KELSIE. Wears pads daily in case she has accidents.   Denies hx of renal stones.     The following portions of the patient's history were reviewed and updated as appropriate: allergies, current medications, past family history, past medical history, past social history, past surgical history and problem list.    Review of Systems  Constitutional: no fever or chills  ENT: no nasal congestion or sore throat  Respiratory: no cough or shortness of breath  Cardiovascular: no chest pain or palpitations  Gastrointestinal: no nausea or vomiting, tolerating diet  Genitourinary: as per HPI  Hematologic/Lymphatic: no easy bruising or lymphadenopathy  Musculoskeletal: no arthralgias or myalgias  Neurological: no seizures or tremors  Behavioral/Psych: no auditory or visual hallucinations     Objective:   Vital Signs:/84   Pulse 64   Ht 5' 6" (1.676 m)   Wt (!) 174 kg (383 lb 9.6 oz)   LMP 01/01/2004   BMI 61.91 kg/m²     Physical Exam   General: alert and oriented, no acute distress  Head: normocephalic, atraumatic  Neck: supple  Respiratory: Symmetric expansion  Cardiovascular: regular rate and rhythm  Abdomen: soft, non tender, non distended  Lymphatic: no inguinal nodes  Skin: normal coloration and turgor, no rashes, no suspicious skin " lesions noted  Neuro: alert and oriented x3, no gross deficits  Psych: normal judgment and insight, normal mood/affect and non-anxious  No CVAT     Lab Review     Urinalysis demonstrates no specimen; UC pending     Lab Results   Component Value Date    WBC 6.00 07/21/2021    HGB 12.7 07/21/2021    HCT 37.8 07/21/2021    MCV 89 07/21/2021     07/21/2021     Lab Results   Component Value Date    CREATININE 0.8 07/21/2021    BUN 11 07/21/2021     Component      Latest Ref Rng & Units 5/31/2022             Urine Culture, Routine       PROTEUS MIRABILIS (A) . . .     Component      Latest Ref Rng & Units 4/19/2022             Urine Culture, Routine       PROTEUS MIRABILIS (A) . . .     Component      Latest Ref Rng & Units 4/1/2022             Urine Culture, Routine       PROTEUS MIRABILIS (A) . . .       Assessment and Plan:   1. Dysuria  2. Recurrent UTI  3. OAB (overactive bladder)  - Urine culture; Standing  - US Retroperitoneal Complete (Kidney and; Future  - solifenacin (VESICARE) 10 MG tablet; Take 1 tablet (10 mg total) by mouth once daily.  Dispense: 30 tablet; Refill: 11     ---We discussed possible causes of urinary incontinence including infection, trauma, medications/alcohol, psychological stressors, restricted mobility, atrophic vaginitis and cognitive dysfunction.   --We discussed how to perform kegel exercise; instructions provided in AVS as well.     --Treat constipation as it occurs  --Trial of vesicare  --May consider Urodynamics in the future  --May consider PFPT in future.      ---We discussed how to prevent UTIs:    --Drink lots of water- at least 1 gallon of water per day  --Avoid bladder irritants such as caffeine, spices, alcohol, citrus, soda    --Void every 3-4 hrs.  --Void soon after urge arises  --Wipe front to back and avoid constipation.  --Void before and after intercourse  --Avoid hot tubs, bubble baths, douche    --Avoid tight fitting clothes and panty hose  --No dryer sheets or  harsh detergents with the undergarments    Beneficial Medications:  --Cranberry supplement- need 36mg of proanthocyanidins (PAC) in supplement- helps to block bacteria from attaching to bladder wall.  --Probiotic- [employer] Ultra 25 Billion CFU Probiotic Complex, Multi Strain.  The Multi Strain is specifically the one that is important as the greater variety of strains is better.  Make sure the product is not .    --D-mannose- 2 grams daily- blocks bacteria receptors  --If post menopausal: Estrace- apply a pea sized amount to urethra 3x weekly at night.    -patient not comfortable with vaginal estrace      This note is dictated on M*Modal word recognition program.  There are word recognition mistakes that are occasionally missed on review.

## 2022-06-22 ENCOUNTER — PATIENT MESSAGE (OUTPATIENT)
Dept: UROLOGY | Facility: CLINIC | Age: 67
End: 2022-06-22
Payer: MEDICARE

## 2022-06-22 ENCOUNTER — OFFICE VISIT (OUTPATIENT)
Dept: INTERNAL MEDICINE | Facility: CLINIC | Age: 67
End: 2022-06-22
Payer: MEDICARE

## 2022-06-22 ENCOUNTER — PATIENT MESSAGE (OUTPATIENT)
Dept: INTERNAL MEDICINE | Facility: CLINIC | Age: 67
End: 2022-06-22
Payer: MEDICARE

## 2022-06-22 VITALS
WEIGHT: 293 LBS | SYSTOLIC BLOOD PRESSURE: 104 MMHG | TEMPERATURE: 98 F | HEIGHT: 66 IN | DIASTOLIC BLOOD PRESSURE: 72 MMHG | RESPIRATION RATE: 20 BRPM | BODY MASS INDEX: 47.09 KG/M2 | HEART RATE: 68 BPM

## 2022-06-22 DIAGNOSIS — E23.6 EMPTY SELLA SYNDROME: ICD-10-CM

## 2022-06-22 DIAGNOSIS — N18.32 CHRONIC KIDNEY DISEASE, STAGE 3B: ICD-10-CM

## 2022-06-22 DIAGNOSIS — M51.36 DEGENERATION OF LUMBAR INTERVERTEBRAL DISC: ICD-10-CM

## 2022-06-22 DIAGNOSIS — G56.01 CARPAL TUNNEL SYNDROME OF RIGHT WRIST: ICD-10-CM

## 2022-06-22 DIAGNOSIS — E66.01 MORBID OBESITY WITH BMI OF 60.0-69.9, ADULT: ICD-10-CM

## 2022-06-22 DIAGNOSIS — D69.6 THROMBOCYTOPENIA: ICD-10-CM

## 2022-06-22 DIAGNOSIS — D58.2 OTHER HEMOGLOBINOPATHIES: ICD-10-CM

## 2022-06-22 DIAGNOSIS — G47.33 OSA ON CPAP: ICD-10-CM

## 2022-06-22 DIAGNOSIS — G43.009 MIGRAINE WITHOUT AURA, NOT REFRACTORY: ICD-10-CM

## 2022-06-22 DIAGNOSIS — Z00.00 ANNUAL PHYSICAL EXAM: ICD-10-CM

## 2022-06-22 DIAGNOSIS — R71.8 ELEVATED RED BLOOD CELL COUNT: ICD-10-CM

## 2022-06-22 DIAGNOSIS — G93.2 PSEUDOTUMOR CEREBRI: ICD-10-CM

## 2022-06-22 DIAGNOSIS — E78.49 OTHER HYPERLIPIDEMIA: ICD-10-CM

## 2022-06-22 DIAGNOSIS — M17.11 PRIMARY OSTEOARTHRITIS OF RIGHT KNEE: ICD-10-CM

## 2022-06-22 DIAGNOSIS — I10 ESSENTIAL HYPERTENSION: Primary | ICD-10-CM

## 2022-06-22 DIAGNOSIS — N95.9 MENOPAUSAL AND PERIMENOPAUSAL DISORDER: ICD-10-CM

## 2022-06-22 LAB
BACTERIA UR CULT: NORMAL
BACTERIA UR CULT: NORMAL

## 2022-06-22 PROCEDURE — 99214 PR OFFICE/OUTPT VISIT, EST, LEVL IV, 30-39 MIN: ICD-10-PCS | Mod: S$PBB,,, | Performed by: INTERNAL MEDICINE

## 2022-06-22 PROCEDURE — 99215 OFFICE O/P EST HI 40 MIN: CPT | Mod: PBBFAC,PO,25 | Performed by: INTERNAL MEDICINE

## 2022-06-22 PROCEDURE — 90677 PCV20 VACCINE IM: CPT | Mod: PBBFAC,PO

## 2022-06-22 PROCEDURE — 99999 PR PBB SHADOW E&M-EST. PATIENT-LVL V: ICD-10-PCS | Mod: PBBFAC,,, | Performed by: INTERNAL MEDICINE

## 2022-06-22 PROCEDURE — 99214 OFFICE O/P EST MOD 30 MIN: CPT | Mod: S$PBB,,, | Performed by: INTERNAL MEDICINE

## 2022-06-22 PROCEDURE — 99999 PR PBB SHADOW E&M-EST. PATIENT-LVL V: CPT | Mod: PBBFAC,,, | Performed by: INTERNAL MEDICINE

## 2022-06-22 RX ORDER — ASPIRIN 81 MG/1
81 TABLET ORAL DAILY
COMMUNITY
End: 2024-01-04

## 2022-06-22 NOTE — TELEPHONE ENCOUNTER
Her urine culture shows multiple organisms none in predominance, this can be due to contamination or it could be a negative urine culture.  If she is still having any burning with urination I recommend she comes in for a catheterized urine sample. She must have scheduled herself with Dr. Ibarra. He does not treat recurrent UTI so I recommend she cancel it

## 2022-06-22 NOTE — PROGRESS NOTES
Subjective:     PCP: Faiza Kuhn MD    Joy Singer is a 67 y.o. female who presents for an annual exam.    Chronic Medical Problems:      Hypertension: The patient has been taking medications as instructed, no medication side effects noted, no chest pain on exertion, no dyspnea on exertion.    BP Readings from Last 2 Encounters:   06/22/22 104/72   06/21/22 130/84       Knee Pain: The patient presents with knee pain involving both knees. Onset was years ago. Inciting event: none known. Current symptoms include: stiffness. Pain is aggravated by any weight bearing. Patient has had no prior knee problems.     Insomnia: Onset was several months ago. Patient describes symptoms as frequent night time awakening and difficulty falling asleep.  Patient denies frequent nighttime urination and leg cramps. Patient has found moderate relief with prescription sleep aid, trazodone.. Symptoms have been well-controlled.       Medical History:   Past Medical History:   Diagnosis Date    Breast cyst     Carotid artery occlusion     Chronic back pain     Chronic bilateral low back pain without sciatica 11/8/2016    Colon polyps 2016    Fibrocystic breast     HA (headache)     Hyperlipidemia     Morbid obesity with BMI of 60.0-69.9, adult     Obstructive sleep apnea (adult) (pediatric)     Pseudotumor cerebri        Family History: family history includes Breast cancer in her maternal aunt, maternal cousin, and maternal cousin; Breast cancer (age of onset: 64) in her maternal cousin; Breast cancer (age of onset: 68) in her maternal aunt and maternal cousin; Cancer in her paternal grandmother; Diabetes in her father; Emphysema in her father and mother; Heart failure in her father and sister; Hypertension in her father; Lung cancer in her maternal aunt, maternal uncle, maternal uncle, maternal uncle, maternal uncle, and mother; Valvular heart disease in her sister.    Surgical History:   Past Surgical History:    Procedure Laterality Date    BLADDER SUSPENSION  2004    BREAST BIOPSY Bilateral 2003 and 2011    Core bx's, benign    BREAST CYST ASPIRATION      CARPAL TUNNEL RELEASE  2000    COLONOSCOPY  2008 and 2011    eye growth removal  2010    FRACTURE SURGERY      HYSTERECTOMY  2004    OOPHORECTOMY      rotator cuff surgery  2011    left shoulder    toselectomy  1962        Social History:  reports that she has never smoked. She has never used smokeless tobacco. She reports current alcohol use. She reports that she does not use drugs.     Allergies:   Review of patient's allergies indicates:   Allergen Reactions    Insect venom Edema, Itching and Swelling    Percocet [oxycodone-acetaminophen] Itching    Venom-honey bee     Unable to assess Rash     Insect Bites       Medications:   Current Outpatient Medications   Medication Sig    acetaZOLAMIDE (DIAMOX) 250 MG tablet Take 250 mg by mouth 2 (two) times daily.     ascorbic acid, vitamin C, 500 mg TbSR Take by mouth once daily.    aspirin (ECOTRIN) 81 MG EC tablet Take 81 mg by mouth once daily.    calcium carbonate-vitamin D3 600 mg(1,500mg) -500 unit Cap Calcium 600 with Vitamin D3 600 mg (1,500 mg)-500 unit capsule   Take 1 capsule twice a day by oral route.    carisoprodol (SOMA) 350 MG tablet Take 350 mg by mouth continuous prn.    clotrimazole-betamethasone 1-0.05% (LOTRISONE) cream Apply topically 2 (two) times daily. To rash on buttocks    diphenhydrAMINE (BENADRYL) 25 mg capsule Take 25 mg by mouth every 6 (six) hours as needed for Itching.    docusate sodium (COLACE) 100 MG capsule Colace 100 mg capsule   Take 2 capsule twice a day by oral route.    folic acid (FOLVITE) 400 MCG tablet Take 400 mcg by mouth once daily.    hydrocodone-acetaminophen 7.5-325mg (NORCO) 7.5-325 mg per tablet TK 1 T PO Q 6 TO 8 H PRN    hydrocortisone (ANUSOL-HC) 2.5 % rectal cream Place rectally 2 (two) times daily.    ketoconazole 2 % Foam Extina 2 % topical  foam as needed    multivitamin-iron-folic acid (CENTRUM) Tab Take 1 tablet by mouth once daily.    polyethylene glycol (GLYCOLAX) 17 gram/dose powder polyethylene glycol 3350 17 gram/dose oral powder as needed    potassium chloride SA (K-DUR,KLOR-CON) 10 MEQ tablet Take 2 tablets (20 mEq total) by mouth once daily.    pravastatin (PRAVACHOL) 40 MG tablet TAKE 1 TABLET DAILY    traZODone (DESYREL) 50 MG tablet TAKE 1 TABLET(50 MG) BY MOUTH EVERY EVENING    vitamin E 1000 UNIT capsule Take 1,000 Units by mouth once daily.    aspirin 325 MG tablet Take 1 tablet by mouth every 6 to 8 hours as needed.    chlorthalidone (HYGROTEN) 25 MG Tab TAKE ONE-HALF (1/2) TABLET DAILY    clindamycin (CLEOCIN) 300 MG capsule Take 1 capsule (300 mg total) by mouth 3 (three) times daily. for 7 days    ketoconazole (NIZORAL) 2 % shampoo     mirabegron (MYRBETRIQ) 50 mg Tb24 Take 1 tablet (50 mg total) by mouth once daily.     No current facility-administered medications for this visit.       Health Maintenance:   Health Maintenance Topics with due status: Not Due       Topic Last Completion Date    TETANUS VACCINE 11/08/2016    Influenza Vaccine 02/18/2021    Lipid Panel 01/03/2022    Mammogram 01/13/2022    Colorectal Cancer Screening 02/16/2022    DEXA Scan 07/12/2022        Eye Exam:   sees Dr. Can   Dental Exam: due   OB/GYN: No data on file.    Mammogram: 1/2022   DEXA scan: due   Colonoscopy:   2/2022    Vaccinations:  Immunization History   Administered Date(s) Administered    COVID-19, MRNA, LN-S, PF (MODERNA FULL 0.5 ML DOSE) 02/10/2021, 03/10/2021, 11/01/2021, 04/13/2022    Pneumococcal Conjugate - 20 Valent 06/22/2022    Td - PF (ADULT) 11/08/2016    Zoster Recombinant 11/14/2019, 01/28/2020, 01/28/2020      Tetanus: 11/2016   Shingrix: done   Pneumonia vaccine: due   Covid vaccine: boosted    ADL's: independent  Memory: normal  Mental health: denies anxiety  Advance Directives: <no  information>  Falls: none  Nutrition: normal  Home Safety: no issues     Body mass index is 61.99 kg/m².  Wt Readings from Last 2 Encounters:   06/22/22 (!) 174.2 kg (384 lb 0.7 oz)   06/21/22 (!) 174 kg (383 lb 9.6 oz)   - lost 45 lbs by cutting down on unhealthy meals, drinks protein shakes every morning      Review of Systems   Constitutional: Negative for chills, diaphoresis, fatigue and fever.   HENT: Negative for congestion, dental problem, ear discharge, ear pain, postnasal drip, rhinorrhea, sinus pressure, sore throat and trouble swallowing.    Eyes: Negative for redness and visual disturbance.   Respiratory: Negative for cough, chest tightness and shortness of breath.    Cardiovascular: Negative for chest pain and palpitations.   Gastrointestinal: Negative for abdominal pain, blood in stool, constipation, diarrhea, nausea and vomiting.   Endocrine: Negative for polydipsia and polyuria.   Genitourinary: Negative for decreased urine volume, dysuria, frequency, hematuria and urgency.   Musculoskeletal: Positive for arthralgias (knee pain, right wrist pain due to carpal tunnel, wears brace at night). Negative for back pain and myalgias.   Skin: Positive for rash (tinea versicolor on back). Negative for wound.   Neurological: Negative for dizziness, weakness, numbness and headaches.   Hematological: Negative for adenopathy.   Psychiatric/Behavioral: Positive for sleep disturbance (trazodone helps). Negative for dysphoric mood. The patient is not nervous/anxious.           Objective:     Physical Exam  Vitals reviewed.   Constitutional:       General: She is awake. She is not in acute distress.     Appearance: Normal appearance. She is well-developed and well-groomed. She is not diaphoretic.   HENT:      Head: Normocephalic and atraumatic.      Right Ear: Hearing, tympanic membrane, ear canal and external ear normal. Tympanic membrane is not erythematous or bulging.      Left Ear: Hearing, tympanic membrane, ear  canal and external ear normal. Tympanic membrane is not erythematous or bulging.      Nose: Nose normal. No congestion.      Mouth/Throat:      Mouth: Mucous membranes are moist.      Tongue: No lesions.      Pharynx: Oropharynx is clear. Uvula midline. No oropharyngeal exudate or posterior oropharyngeal erythema.   Eyes:      General: Lids are normal. Vision grossly intact. Gaze aligned appropriately. No scleral icterus.     Conjunctiva/sclera:      Right eye: Right conjunctiva is not injected.      Left eye: Left conjunctiva is not injected.      Pupils: Pupils are equal, round, and reactive to light.   Neck:      Thyroid: No thyroid mass or thyromegaly.   Cardiovascular:      Rate and Rhythm: Normal rate and regular rhythm.      Pulses: Normal pulses.      Heart sounds: Normal heart sounds. No murmur heard.  Pulmonary:      Effort: Pulmonary effort is normal. No respiratory distress.      Breath sounds: Normal breath sounds. No decreased breath sounds or wheezing.   Chest:   Breasts:      Right: No supraclavicular adenopathy.      Left: No supraclavicular adenopathy.       Abdominal:      General: Bowel sounds are normal. There is no distension.      Palpations: Abdomen is soft. Abdomen is not rigid.      Tenderness: There is no abdominal tenderness. There is no guarding or rebound.   Musculoskeletal:         General: Normal range of motion.      Cervical back: Normal range of motion and neck supple.      Right lower leg: No edema.      Left lower leg: No edema.   Lymphadenopathy:      Cervical: No cervical adenopathy.      Upper Body:      Right upper body: No supraclavicular adenopathy.      Left upper body: No supraclavicular adenopathy.   Skin:     General: Skin is warm and dry.      Coloration: Skin is not cyanotic.      Findings: No lesion.      Nails: There is no clubbing.   Neurological:      General: No focal deficit present.      Mental Status: She is alert and oriented to person, place, and time.       Sensory: Sensation is intact.      Coordination: Coordination is intact.      Gait: Gait is intact.      Deep Tendon Reflexes: Reflexes are normal and symmetric.   Psychiatric:         Attention and Perception: Attention normal.         Mood and Affect: Mood normal.         Behavior: Behavior is cooperative.          Assessment:        1. Essential hypertension    2. Primary osteoarthritis of right knee    3. Pseudotumor cerebri    4. LIZETH on CPAP    5. Migraine without aura, not refractory    6. Degeneration of lumbar intervertebral disc    7. Carpal tunnel syndrome of right wrist    8. Empty sella syndrome    9. Chronic kidney disease, stage 3b     10. Thrombocytopenia    11. Annual physical exam    12. Menopausal and perimenopausal disorder    13. Elevated red blood cell count    14. Other hemoglobinopathies     15. Morbid obesity with BMI of 60.0-69.9, adult           Plan:     1. Essential hypertension  - controlled, continue chlorthalidone  - Basic Metabolic Panel; Future    2. Primary osteoarthritis of right knee  - s/p IA steroid injections, needs replacement but limited by BMI  - uses Norco as needed    3. Pseudotumor cerebri  - stable, sees Neuro-ophthalmology regularly, continue acetazolamide    4. LIZETH on CPAP  - return to Sleep Medicine for f/u    5. Migraine without aura, not refractory  Managed by Neurology    6. Degeneration of lumbar intervertebral disc  - takes hydrocodone as needed, sees Ortho outside of [facility]    7. Carpal tunnel syndrome of right wrist  - continue brace use at night, Hand Ortho if symptoms worsen    8. Empty sella syndrome  - seen on past MRI, sees Neuro-ophthalmology    9. Chronic kidney disease, stage 3b   - kidney function is stable, avoid nephrotoxins, hydrate adequately    10. Thrombocytopenia  - CBC Auto Differential; Future    11. Annual physical exam  - reviewed recent labs with patient  - Pneumococcal Conjugate Vaccine (20 Valent) (IM)    12. Menopausal and  perimenopausal disorder  - DEXA scan    13. Elevated red blood cell count  - Hemoglobin Electrophoresis Cascade, Blood; Future  - CBC Auto Differential; Future  - Pathologist Interpretation Differential; Future  - ERYTHROPOIETIN; Future    14. Other hemoglobinopathies   - Hemoglobin Electrophoresis Cascade, Blood; Future    15. Morbid obesity with BMI of 60.0-69.9, adult  - continue to reduce sugar and fat intake, increase activity as much as possible    RTC in weeks/months for follow-up or sooner if needed    __________________________    Faiza Kuhn MD, PharmD  [facility]- Internal Medicine  American Board of Obesity Medicine diplomate  Office [phone]

## 2022-07-03 NOTE — TELEPHONE ENCOUNTER
----- Message from Mary White sent at 4/1/2022 11:43 AM CDT -----  Contact: Self   Pt is requesting a call regarding her uti. Pt states she dropped off her urine sample,. Pt states does not want to go through the weekend with issues. Pt states if unable to get rx pt will be going to er due to issues. Please call and advise     
Please resend to Sari  
See phone message, please advise.  
Spoke to pt.    Dysuria    Please advise on lab results  
macrobid 100mg bid  
Statement Selected

## 2022-07-06 ENCOUNTER — LAB VISIT (OUTPATIENT)
Dept: LAB | Facility: HOSPITAL | Age: 67
End: 2022-07-06
Attending: NURSE PRACTITIONER
Payer: MEDICARE

## 2022-07-06 DIAGNOSIS — N39.0 RECURRENT UTI: ICD-10-CM

## 2022-07-06 DIAGNOSIS — R30.0 DYSURIA: ICD-10-CM

## 2022-07-06 PROCEDURE — 87086 URINE CULTURE/COLONY COUNT: CPT | Performed by: NURSE PRACTITIONER

## 2022-07-06 PROCEDURE — 87077 CULTURE AEROBIC IDENTIFY: CPT | Performed by: NURSE PRACTITIONER

## 2022-07-06 PROCEDURE — 87088 URINE BACTERIA CULTURE: CPT | Performed by: NURSE PRACTITIONER

## 2022-07-06 PROCEDURE — 87186 SC STD MICRODIL/AGAR DIL: CPT | Performed by: NURSE PRACTITIONER

## 2022-07-07 ENCOUNTER — PATIENT MESSAGE (OUTPATIENT)
Dept: UROLOGY | Facility: CLINIC | Age: 67
End: 2022-07-07

## 2022-07-07 ENCOUNTER — PATIENT MESSAGE (OUTPATIENT)
Dept: INTERNAL MEDICINE | Facility: CLINIC | Age: 67
End: 2022-07-07
Payer: MEDICARE

## 2022-07-07 ENCOUNTER — OFFICE VISIT (OUTPATIENT)
Dept: UROLOGY | Facility: CLINIC | Age: 67
End: 2022-07-07
Attending: UROLOGY
Payer: MEDICARE

## 2022-07-07 VITALS
HEART RATE: 66 BPM | WEIGHT: 293 LBS | DIASTOLIC BLOOD PRESSURE: 85 MMHG | BODY MASS INDEX: 47.09 KG/M2 | SYSTOLIC BLOOD PRESSURE: 145 MMHG | HEIGHT: 66 IN

## 2022-07-07 DIAGNOSIS — N39.0 RECURRENT UTI: ICD-10-CM

## 2022-07-07 DIAGNOSIS — R31.0 GROSS HEMATURIA: Primary | ICD-10-CM

## 2022-07-07 PROCEDURE — 99214 OFFICE O/P EST MOD 30 MIN: CPT | Mod: S$GLB,,, | Performed by: UROLOGY

## 2022-07-07 PROCEDURE — 87086 URINE CULTURE/COLONY COUNT: CPT | Performed by: UROLOGY

## 2022-07-07 PROCEDURE — 99214 PR OFFICE/OUTPT VISIT, EST, LEVL IV, 30-39 MIN: ICD-10-PCS | Mod: S$GLB,,, | Performed by: UROLOGY

## 2022-07-07 RX ORDER — MIRABEGRON 50 MG/1
50 TABLET, FILM COATED, EXTENDED RELEASE ORAL DAILY
Qty: 30 TABLET | Refills: 11 | Status: SHIPPED | OUTPATIENT
Start: 2022-07-07 | End: 2023-09-05

## 2022-07-07 RX ORDER — KETOCONAZOLE 20 MG/ML
SHAMPOO, SUSPENSION TOPICAL
COMMUNITY
Start: 2022-05-11

## 2022-07-07 NOTE — PROGRESS NOTES
"Subjective:      Joy Singer is a 66 y.o. female who was referred by Faiza Kuhn MD for evaluation of hematuria.      Referral placed 5/30, however she had positive urine culture that resulted on 5/31 and has been treated. In the interim she was seen by April Langford NP, at [facility] who is treating her recurrent UTIs.     Stopped vesicare after 2 days due to constipation.    Never started vaginal estrogen.    The following portions of the patient's history were reviewed and updated as appropriate: allergies, current medications, past family history, past medical history, past social history, past surgical history and problem list.    Objective:   Vital Signs:BP (!) 145/85 (BP Location: Left arm, Patient Position: Sitting, BP Method: Large (Automatic))   Pulse 66   Ht 5' 6" (1.676 m)   Wt (!) 174.2 kg (384 lb 0.7 oz)   LMP 01/01/2004   BMI 61.99 kg/m²     Physical Exam   General: alert and oriented, no acute distress  Head: normocephalic, atraumatic  Neck: supple, no lymphadenopathy, normal ROM, no masses  Respiratory: Symmetric expansion, non-labored breathing  Neuro: alert and oriented x3, no gross deficits  Psych: normal judgment and insight, normal mood/affect and non-anxious    Bladder Scan PVR: 0cc      Lab Review   Urinalysis demonstrates positive for red blood cells  Lab Results   Component Value Date    WBC 7.51 06/01/2022    HGB 14.4 06/01/2022    HCT 45.9 06/01/2022    MCV 83 06/01/2022     (L) 06/01/2022     Lab Results   Component Value Date    CREATININE 0.9 06/01/2022    BUN 18 06/01/2022     Assessment:     1. Gross hematuria    2. Recurrent UTI        Plan:   1. Hematuria associated with culture proven UTI - no workup needed  2. Continue to FU w/ CELSO for management of recurrent UTIs  3. Trial myrbetriq  4. Again encouraged to start vaginal estrogen    "

## 2022-07-08 LAB
BACTERIA UR CULT: ABNORMAL
BACTERIA UR CULT: NORMAL

## 2022-07-11 ENCOUNTER — HOSPITAL ENCOUNTER (OUTPATIENT)
Dept: RADIOLOGY | Facility: HOSPITAL | Age: 67
Discharge: HOME | End: 2022-07-11
Attending: NURSE PRACTITIONER
Payer: MEDICARE

## 2022-07-11 ENCOUNTER — TELEPHONE (OUTPATIENT)
Dept: UROLOGY | Facility: CLINIC | Age: 67
End: 2022-07-11
Payer: MEDICARE

## 2022-07-11 DIAGNOSIS — R30.0 DYSURIA: ICD-10-CM

## 2022-07-11 DIAGNOSIS — R30.0 DYSURIA: Primary | ICD-10-CM

## 2022-07-11 DIAGNOSIS — N39.0 RECURRENT UTI: ICD-10-CM

## 2022-07-11 PROCEDURE — 76770 US RETROPERITONEAL COMPLETE: ICD-10-PCS | Mod: 26,,, | Performed by: RADIOLOGY

## 2022-07-11 PROCEDURE — 76770 US EXAM ABDO BACK WALL COMP: CPT | Mod: 26,,, | Performed by: RADIOLOGY

## 2022-07-11 PROCEDURE — 76770 US EXAM ABDO BACK WALL COMP: CPT | Mod: TC

## 2022-07-11 RX ORDER — SULFAMETHOXAZOLE AND TRIMETHOPRIM 400; 80 MG/1; MG/1
1 TABLET ORAL 2 TIMES DAILY
Qty: 14 TABLET | Refills: 0 | Status: SHIPPED | OUTPATIENT
Start: 2022-07-11 | End: 2022-07-18

## 2022-07-11 NOTE — TELEPHONE ENCOUNTER
----- Message from April Langford NP sent at 7/11/2022  7:25 AM CDT -----  Please call patient, UC positive, Bactrim sent to pharmacy.  Thanks!  M

## 2022-07-12 ENCOUNTER — APPOINTMENT (OUTPATIENT)
Dept: RADIOLOGY | Facility: CLINIC | Age: 67
End: 2022-07-12
Attending: INTERNAL MEDICINE
Payer: MEDICARE

## 2022-07-12 DIAGNOSIS — N95.9 MENOPAUSAL AND PERIMENOPAUSAL DISORDER: ICD-10-CM

## 2022-07-12 PROCEDURE — 77081 DEXA BONE DENSITY APPENDICULAR SKELETON: ICD-10-PCS | Mod: 26,,, | Performed by: INTERNAL MEDICINE

## 2022-07-12 PROCEDURE — 77081 DXA BONE DENSITY APPENDICULR: CPT | Mod: 26,,, | Performed by: INTERNAL MEDICINE

## 2022-07-12 PROCEDURE — 77081 DXA BONE DENSITY APPENDICULR: CPT | Mod: TC,PO

## 2022-07-14 ENCOUNTER — PATIENT MESSAGE (OUTPATIENT)
Dept: SLEEP MEDICINE | Facility: CLINIC | Age: 67
End: 2022-07-14
Payer: MEDICARE

## 2022-07-14 ENCOUNTER — HOSPITAL ENCOUNTER (OUTPATIENT)
Dept: RADIOLOGY | Facility: HOSPITAL | Age: 67
Discharge: HOME | End: 2022-07-14
Attending: NURSE PRACTITIONER
Payer: MEDICARE

## 2022-07-14 ENCOUNTER — TELEPHONE (OUTPATIENT)
Dept: RADIOLOGY | Facility: HOSPITAL | Age: 67
End: 2022-07-14
Payer: MEDICARE

## 2022-07-14 DIAGNOSIS — Z91.89 AT HIGH RISK FOR BREAST CANCER: ICD-10-CM

## 2022-07-14 DIAGNOSIS — Z80.3 FAMILY HISTORY OF BREAST CANCER: ICD-10-CM

## 2022-07-19 ENCOUNTER — HOSPITAL ENCOUNTER (OUTPATIENT)
Dept: RADIOLOGY | Facility: HOSPITAL | Age: 67
Discharge: HOME | End: 2022-07-19
Attending: NURSE PRACTITIONER
Payer: MEDICARE

## 2022-07-19 ENCOUNTER — PATIENT MESSAGE (OUTPATIENT)
Dept: RESEARCH | Facility: CLINIC | Age: 67
End: 2022-07-19
Payer: MEDICARE

## 2022-07-19 PROCEDURE — 76641 ULTRASOUND BREAST COMPLETE: CPT | Mod: TC,50

## 2022-07-19 PROCEDURE — 76641 ULTRASOUND BREAST COMPLETE: CPT | Mod: 26,50,, | Performed by: RADIOLOGY

## 2022-07-19 PROCEDURE — 76641 US BREAST BILATERAL COMPLETE: ICD-10-PCS | Mod: 26,50,, | Performed by: RADIOLOGY

## 2022-07-20 ENCOUNTER — LAB VISIT (OUTPATIENT)
Dept: LAB | Facility: HOSPITAL | Age: 67
End: 2022-07-20
Attending: INTERNAL MEDICINE
Payer: MEDICARE

## 2022-07-20 ENCOUNTER — PATIENT MESSAGE (OUTPATIENT)
Dept: UROLOGY | Facility: CLINIC | Age: 67
End: 2022-07-20
Payer: MEDICARE

## 2022-07-20 DIAGNOSIS — I10 PRIMARY HYPERTENSION: ICD-10-CM

## 2022-07-20 LAB
ANION GAP SERPL CALC-SCNC: 11 MMOL/L (ref 8–16)
BUN SERPL-MCNC: 20 MG/DL (ref 8–23)
CALCIUM SERPL-MCNC: 9.9 MG/DL (ref 8.7–10.5)
CHLORIDE SERPL-SCNC: 108 MMOL/L (ref 95–110)
CO2 SERPL-SCNC: 23 MMOL/L (ref 23–29)
CREAT SERPL-MCNC: 1 MG/DL (ref 0.5–1.4)
EST. GFR  (AFRICAN AMERICAN): >60 ML/MIN/1.73 M^2
EST. GFR  (NON AFRICAN AMERICAN): 59.3 ML/MIN/1.73 M^2
GLUCOSE SERPL-MCNC: 128 MG/DL (ref 70–110)
POTASSIUM SERPL-SCNC: 3.9 MMOL/L (ref 3.5–5.1)
SODIUM SERPL-SCNC: 142 MMOL/L (ref 136–145)

## 2022-07-20 PROCEDURE — 80048 BASIC METABOLIC PNL TOTAL CA: CPT | Performed by: INTERNAL MEDICINE

## 2022-07-20 PROCEDURE — 36415 COLL VENOUS BLD VENIPUNCTURE: CPT | Mod: PO | Performed by: INTERNAL MEDICINE

## 2022-07-22 DIAGNOSIS — N39.0 RECURRENT UTI: Primary | ICD-10-CM

## 2022-07-22 RX ORDER — CLINDAMYCIN HYDROCHLORIDE 300 MG/1
300 CAPSULE ORAL 3 TIMES DAILY
Qty: 21 CAPSULE | Refills: 0 | Status: SHIPPED | OUTPATIENT
Start: 2022-07-22 | End: 2022-07-29

## 2022-07-24 PROBLEM — N18.32 CHRONIC KIDNEY DISEASE, STAGE 3B: Status: ACTIVE | Noted: 2022-07-24

## 2022-07-24 NOTE — PROGRESS NOTES
Subjective:     PCP: Faiza Kuhn MD    Joy Singer is a 67 y.o. female who presents for an annual exam.    Chronic Medical Problems:      Hypertension: The patient has been taking medications as instructed, no medication side effects noted, no chest pain on exertion, no dyspnea on exertion.    BP Readings from Last 2 Encounters:   06/22/22 104/72   06/21/22 130/84       Knee Pain: The patient presents with knee pain involving both knees. Onset was years ago. Inciting event: none known. Current symptoms include: stiffness. Pain is aggravated by any weight bearing. Patient has had no prior knee problems.     Insomnia: Onset was several months ago. Patient describes symptoms as frequent night time awakening and difficulty falling asleep.  Patient denies frequent nighttime urination and leg cramps. Patient has found moderate relief with prescription sleep aid, trazodone.. Symptoms have been well-controlled.       Medical History:   Past Medical History:   Diagnosis Date    Breast cyst     Carotid artery occlusion     Chronic back pain     Chronic bilateral low back pain without sciatica 11/8/2016    Colon polyps 2016    Fibrocystic breast     HA (headache)     Hyperlipidemia     Morbid obesity with BMI of 60.0-69.9, adult     Obstructive sleep apnea (adult) (pediatric)     Pseudotumor cerebri        Family History: family history includes Breast cancer in her maternal aunt, maternal cousin, and maternal cousin; Breast cancer (age of onset: 64) in her maternal cousin; Breast cancer (age of onset: 68) in her maternal aunt and maternal cousin; Cancer in her paternal grandmother; Diabetes in her father; Emphysema in her father and mother; Heart failure in her father and sister; Hypertension in her father; Lung cancer in her maternal aunt, maternal uncle, maternal uncle, maternal uncle, maternal uncle, and mother; Valvular heart disease in her sister.    Surgical History:   Past Surgical History:    Procedure Laterality Date    BLADDER SUSPENSION  2004    BREAST BIOPSY Bilateral 2003 and 2011    Core bx's, benign    BREAST CYST ASPIRATION      CARPAL TUNNEL RELEASE  2000    COLONOSCOPY  2008 and 2011    eye growth removal  2010    FRACTURE SURGERY      HYSTERECTOMY  2004    OOPHORECTOMY      rotator cuff surgery  2011    left shoulder    toselectomy  1962        Social History:  reports that she has never smoked. She has never used smokeless tobacco. She reports current alcohol use. She reports that she does not use drugs.     Allergies:   Review of patient's allergies indicates:   Allergen Reactions    Insect venom Edema, Itching and Swelling    Percocet [oxycodone-acetaminophen] Itching    Venom-honey bee     Unable to assess Rash     Insect Bites       Medications:   Current Outpatient Medications   Medication Sig    acetaZOLAMIDE (DIAMOX) 250 MG tablet Take 250 mg by mouth 2 (two) times daily.     ascorbic acid, vitamin C, 500 mg TbSR Take by mouth once daily.    aspirin (ECOTRIN) 81 MG EC tablet Take 81 mg by mouth once daily.    calcium carbonate-vitamin D3 600 mg(1,500mg) -500 unit Cap Calcium 600 with Vitamin D3 600 mg (1,500 mg)-500 unit capsule   Take 1 capsule twice a day by oral route.    carisoprodol (SOMA) 350 MG tablet Take 350 mg by mouth continuous prn.    clotrimazole-betamethasone 1-0.05% (LOTRISONE) cream Apply topically 2 (two) times daily. To rash on buttocks    diphenhydrAMINE (BENADRYL) 25 mg capsule Take 25 mg by mouth every 6 (six) hours as needed for Itching.    docusate sodium (COLACE) 100 MG capsule Colace 100 mg capsule   Take 2 capsule twice a day by oral route.    folic acid (FOLVITE) 400 MCG tablet Take 400 mcg by mouth once daily.    hydrocodone-acetaminophen 7.5-325mg (NORCO) 7.5-325 mg per tablet TK 1 T PO Q 6 TO 8 H PRN    hydrocortisone (ANUSOL-HC) 2.5 % rectal cream Place rectally 2 (two) times daily.    ketoconazole 2 % Foam Extina 2 % topical  foam as needed    multivitamin-iron-folic acid (CENTRUM) Tab Take 1 tablet by mouth once daily.    polyethylene glycol (GLYCOLAX) 17 gram/dose powder polyethylene glycol 3350 17 gram/dose oral powder as needed    potassium chloride SA (K-DUR,KLOR-CON) 10 MEQ tablet Take 2 tablets (20 mEq total) by mouth once daily.    pravastatin (PRAVACHOL) 40 MG tablet TAKE 1 TABLET DAILY    traZODone (DESYREL) 50 MG tablet TAKE 1 TABLET(50 MG) BY MOUTH EVERY EVENING    vitamin E 1000 UNIT capsule Take 1,000 Units by mouth once daily.    aspirin 325 MG tablet Take 1 tablet by mouth every 6 to 8 hours as needed.    chlorthalidone (HYGROTEN) 25 MG Tab TAKE ONE-HALF (1/2) TABLET DAILY    clindamycin (CLEOCIN) 300 MG capsule Take 1 capsule (300 mg total) by mouth 3 (three) times daily. for 7 days    ketoconazole (NIZORAL) 2 % shampoo     mirabegron (MYRBETRIQ) 50 mg Tb24 Take 1 tablet (50 mg total) by mouth once daily.     No current facility-administered medications for this visit.       Health Maintenance:   Health Maintenance Topics with due status: Not Due       Topic Last Completion Date    TETANUS VACCINE 11/08/2016    Influenza Vaccine 02/18/2021    Lipid Panel 01/03/2022    Mammogram 01/13/2022    Colorectal Cancer Screening 02/16/2022    DEXA Scan 07/12/2022        Eye Exam:   sees Dr. Cna   Dental Exam: due    Mammogram: 1/2022   DEXA scan: due   Colonoscopy:   2/2022 @ [facility]    Vaccinations:  Immunization History   Administered Date(s) Administered    COVID-19, MRNA, LN-S, PF (MODERNA FULL 0.5 ML DOSE) 02/10/2021, 03/10/2021, 11/01/2021, 04/13/2022    Pneumococcal Conjugate - 20 Valent 06/22/2022    Td - PF (ADULT) 11/08/2016    Zoster Recombinant 11/14/2019, 01/28/2020, 01/28/2020      Tetanus: 11/2016   Shingrix: done   Pneumonia vaccine: due   Covid vaccine: boosted    ADL's: independent  Memory: normal  Mental health: denies anxiety  Advance Directives: <no  information>  Falls: none  Nutrition: normal  Home Safety: no issues     Body mass index is 61.99 kg/m².  Wt Readings from Last 2 Encounters:   06/22/22 (!) 174.2 kg (384 lb 0.7 oz)   06/21/22 (!) 174 kg (383 lb 9.6 oz)   - lost 45 lbs by cutting down on unhealthy meals, drinks protein shakes every morning      Review of Systems   Constitutional: Negative for chills, diaphoresis, fatigue and fever.   HENT: Negative for congestion, dental problem, ear discharge, ear pain, postnasal drip, rhinorrhea, sinus pressure, sore throat and trouble swallowing.    Eyes: Negative for redness and visual disturbance.   Respiratory: Negative for cough, chest tightness and shortness of breath.    Cardiovascular: Negative for chest pain and palpitations.   Gastrointestinal: Negative for abdominal pain, blood in stool, constipation, diarrhea, nausea and vomiting.   Endocrine: Negative for polydipsia and polyuria.   Genitourinary: Negative for decreased urine volume, dysuria, frequency, hematuria and urgency.   Musculoskeletal: Positive for arthralgias (knee pain, right wrist pain due to carpal tunnel, wears brace at night). Negative for back pain and myalgias.   Skin: Positive for rash (tinea versicolor on back). Negative for wound.   Neurological: Negative for dizziness, weakness, numbness and headaches.   Hematological: Negative for adenopathy.   Psychiatric/Behavioral: Positive for sleep disturbance (trazodone helps). Negative for dysphoric mood. The patient is not nervous/anxious.           Objective:     Physical Exam  Vitals reviewed.   Constitutional:       General: She is awake. She is not in acute distress.     Appearance: Normal appearance. She is well-developed and well-groomed. She is not diaphoretic.   HENT:      Head: Normocephalic and atraumatic.      Right Ear: Hearing, tympanic membrane, ear canal and external ear normal. Tympanic membrane is not erythematous or bulging.      Left Ear: Hearing, tympanic membrane, ear  canal and external ear normal. Tympanic membrane is not erythematous or bulging.      Nose: Nose normal. No congestion.      Mouth/Throat:      Mouth: Mucous membranes are moist.      Tongue: No lesions.      Pharynx: Oropharynx is clear. Uvula midline. No oropharyngeal exudate or posterior oropharyngeal erythema.   Eyes:      General: Lids are normal. Vision grossly intact. Gaze aligned appropriately. No scleral icterus.     Conjunctiva/sclera:      Right eye: Right conjunctiva is not injected.      Left eye: Left conjunctiva is not injected.      Pupils: Pupils are equal, round, and reactive to light.   Neck:      Thyroid: No thyroid mass or thyromegaly.   Cardiovascular:      Rate and Rhythm: Normal rate and regular rhythm.      Pulses: Normal pulses.      Heart sounds: Normal heart sounds. No murmur heard.  Pulmonary:      Effort: Pulmonary effort is normal. No respiratory distress.      Breath sounds: Normal breath sounds. No decreased breath sounds or wheezing.   Chest:   Breasts:      Right: No supraclavicular adenopathy.      Left: No supraclavicular adenopathy.       Abdominal:      General: Bowel sounds are normal. There is no distension.      Palpations: Abdomen is soft. Abdomen is not rigid.      Tenderness: There is no abdominal tenderness. There is no guarding or rebound.   Musculoskeletal:         General: Normal range of motion.      Cervical back: Normal range of motion and neck supple.      Right lower leg: No edema.      Left lower leg: No edema.   Lymphadenopathy:      Cervical: No cervical adenopathy.      Upper Body:      Right upper body: No supraclavicular adenopathy.      Left upper body: No supraclavicular adenopathy.   Skin:     General: Skin is warm and dry.      Coloration: Skin is not cyanotic.      Findings: No lesion.      Nails: There is no clubbing.   Neurological:      General: No focal deficit present.      Mental Status: She is alert and oriented to person, place, and time.       Sensory: Sensation is intact.      Coordination: Coordination is intact.      Gait: Gait is intact.      Deep Tendon Reflexes: Reflexes are normal and symmetric.   Psychiatric:         Attention and Perception: Attention normal.         Mood and Affect: Mood normal.         Behavior: Behavior is cooperative.          Assessment:        1. Essential hypertension    2. Primary osteoarthritis of right knee    3. Pseudotumor cerebri    4. LIZETH on CPAP    5. Migraine without aura, not refractory    6. Degeneration of lumbar intervertebral disc    7. Carpal tunnel syndrome of right wrist    8. Empty sella syndrome    9. Chronic kidney disease, stage 3b     10. Thrombocytopenia    11. Other hyperlipidemia    12. Annual physical exam    13. Menopausal and perimenopausal disorder    14. Elevated red blood cell count    15. Other hemoglobinopathies     16. Morbid obesity with BMI of 60.0-69.9, adult           Plan:     1. Essential hypertension  - controlled, continue chlorthalidone  - Basic Metabolic Panel; Future    2. Primary osteoarthritis of right knee  - s/p IA steroid injections, needs replacement but limited by BMI  - uses Norco as needed    3. Pseudotumor cerebri  - stable, sees Neuro-ophthalmology regularly, continue acetazolamide    4. LIZETH on CPAP  - return to Sleep Medicine for f/u    5. Migraine without aura, not refractory  Managed by Neurology    6. Degeneration of lumbar intervertebral disc  - takes hydrocodone as needed, sees Ortho outside of [facility]    7. Carpal tunnel syndrome of right wrist  - continue brace use at night, Hand Ortho if symptoms worsen    8. Empty sella syndrome  - seen on past MRI, sees Neuro-ophthalmology    9. Chronic kidney disease, stage 3b   - kidney function is stable, avoid nephrotoxins, hydrate adequately    10. Thrombocytopenia  - CBC Auto Differential; Future    11. Hyperlipidemia  - controlled, continue pravachol     12. Annual physical exam  - reviewed recent labs with  patient  - Pneumococcal Conjugate Vaccine (20 Valent) (IM)    13. Menopausal and perimenopausal disorder  - DEXA scan    14. Elevated red blood cell count  - Hemoglobin Electrophoresis Cascade, Blood; Future  - CBC Auto Differential; Future  - Pathologist Interpretation Differential; Future  - ERYTHROPOIETIN; Future    15. Other hemoglobinopathies   - Hemoglobin Electrophoresis Cascade, Blood; Future    16. Morbid obesity with BMI of 60.0-69.9, adult  - continue to reduce sugar and fat intake, increase activity as much as possible    RTC in 4 months for follow-up or sooner if needed    __________________________    Faiza Kuhn MD, PharmD  [facility]- Internal Medicine  American Board of Obesity Medicine diplomate  Office [phone]

## 2022-08-05 ENCOUNTER — PATIENT MESSAGE (OUTPATIENT)
Dept: UROLOGY | Facility: CLINIC | Age: 67
End: 2022-08-05
Payer: MEDICARE

## 2022-08-05 ENCOUNTER — LAB VISIT (OUTPATIENT)
Dept: LAB | Facility: HOSPITAL | Age: 67
End: 2022-08-05
Payer: MEDICARE

## 2022-08-05 DIAGNOSIS — N39.0 RECURRENT UTI: Primary | ICD-10-CM

## 2022-08-05 DIAGNOSIS — N39.0 RECURRENT UTI: ICD-10-CM

## 2022-08-05 DIAGNOSIS — R30.0 DYSURIA: ICD-10-CM

## 2022-08-05 PROCEDURE — 87086 URINE CULTURE/COLONY COUNT: CPT | Performed by: NURSE PRACTITIONER

## 2022-08-05 PROCEDURE — 81001 URINALYSIS AUTO W/SCOPE: CPT | Performed by: NURSE PRACTITIONER

## 2022-08-05 NOTE — TELEPHONE ENCOUNTER
Spoke with patient. Informed her that the lab request has been submitted to her chart and that she could drop off the specimen to the lab for testing. Verbalized understanding. No further issues discussed.

## 2022-08-06 LAB
BACTERIA #/AREA URNS AUTO: ABNORMAL /HPF
MICROSCOPIC COMMENT: ABNORMAL
RBC #/AREA URNS AUTO: >100 /HPF (ref 0–4)
WBC #/AREA URNS AUTO: 5 /HPF (ref 0–5)

## 2022-08-07 LAB — BACTERIA UR CULT: NORMAL

## 2022-08-08 ENCOUNTER — OFFICE VISIT (OUTPATIENT)
Dept: UROLOGY | Facility: CLINIC | Age: 67
End: 2022-08-08
Payer: MEDICARE

## 2022-08-08 DIAGNOSIS — R31.0 GROSS HEMATURIA: Primary | ICD-10-CM

## 2022-08-08 PROCEDURE — 99214 OFFICE O/P EST MOD 30 MIN: CPT | Mod: 95,,, | Performed by: NURSE PRACTITIONER

## 2022-08-08 PROCEDURE — 99214 PR OFFICE/OUTPT VISIT, EST, LEVL IV, 30-39 MIN: ICD-10-PCS | Mod: 95,,, | Performed by: NURSE PRACTITIONER

## 2022-08-08 NOTE — PROGRESS NOTES
The patient location is: [place]        The chief complaint leading to consultation is: hematuria    Visit type: Virtual visit with audio and video  Total time spent with patient: 15 minutes     This includes face to face time and non-face to face time preparing to see the patient (eg, review of tests), obtaining and/or reviewing separately obtained history, documenting clinical information in the electronic or other health record, independently interpreting results and communicating results to the patient/family/caregiver, or care coordinator.      Subjective:      Joy Singer is a 67 y.o. female who returns today regarding hematuria.    Currently on Vesicare of [facility]  She has history of Inez; last culture proven UTI was 7/19 revealed STREPTOCOCCUS GALLOLYTICUS 50,000 - 99,999 cfu/ml  She notified office of UTI symptoms with dark urine; UC was obtained and revealed no growth. Micro UA revealed >100 RBCs/HPF.   RBUS on 7/11 was negative for hydro, stones, masses.     The following portions of the patient's history were reviewed and updated as appropriate: allergies, current medications, past family history, past medical history, past social history, past surgical history and problem list.    Review of Systems  A comprehensive multipoint review of systems was negative except as otherwise stated in the HPI.     Objective:   Vitals: LMP 01/01/2004       Physical Exam   General: alert and oriented, no acute distress  Head: normocephalic, atraumatic  Neck: supple, no lymphadenopathy, normal ROM, no masses      Lab Review   Urinalysis demonstrates no specimen   Lab Results   Component Value Date    WBC 7.51 06/01/2022    HGB 14.4 06/01/2022    HCT 45.9 06/01/2022    MCV 83 06/01/2022     (L) 06/01/2022     Lab Results   Component Value Date    CREATININE 1.0 07/20/2022    BUN 20 07/20/2022       Imaging   US RETROPERITONEAL COMPLETE     CLINICAL HISTORY:  Dysuria     FINDINGS:  Right kidney measures 11.5 cm the left  13.2.  The right resistive index 0.75 left 0.68.  Splenic artery RI is 0.58.  Bladder is not well distended.  Kidneys demonstrate no mass, scar, stone, or hydronephrosis.     Impression:     No significant abnormality seen.        Electronically signed by: Scooby Larson MD  Date:                                            07/11/2022  Time:                                           15:27      Assessment and Plan:   1. Gross hematuria  --UC negative  --UA: >100RBCs/HPF  --Patient would like to proceed with full hematuria workup including contrasted imaging   - CT Urogram Abd Pelvis W WO; Future  - Cystoscopy; Future     --FU after CT     This note is dictated on M*Modal word recognition program.  There are word recognition mistakes that are occasionally missed on review.

## 2022-08-15 ENCOUNTER — HOSPITAL ENCOUNTER (OUTPATIENT)
Dept: RADIOLOGY | Facility: HOSPITAL | Age: 67
Discharge: HOME | End: 2022-08-15
Attending: NURSE PRACTITIONER
Payer: MEDICARE

## 2022-08-15 DIAGNOSIS — R31.0 GROSS HEMATURIA: ICD-10-CM

## 2022-08-15 PROCEDURE — 74178 CT ABD&PLV WO CNTR FLWD CNTR: CPT | Mod: 26,,, | Performed by: STUDENT IN AN ORGANIZED HEALTH CARE EDUCATION/TRAINING PROGRAM

## 2022-08-15 PROCEDURE — 74178 CT ABD&PLV WO CNTR FLWD CNTR: CPT | Mod: TC

## 2022-08-15 PROCEDURE — 25500020 PHARM REV CODE 255: Performed by: NURSE PRACTITIONER

## 2022-08-15 PROCEDURE — 74178 CT UROGRAM ABD PELVIS W WO: ICD-10-PCS | Mod: 26,,, | Performed by: STUDENT IN AN ORGANIZED HEALTH CARE EDUCATION/TRAINING PROGRAM

## 2022-08-15 RX ADMIN — IOHEXOL 100 ML: 350 INJECTION, SOLUTION INTRAVENOUS at 06:08

## 2022-08-18 ENCOUNTER — PATIENT MESSAGE (OUTPATIENT)
Dept: SLEEP MEDICINE | Facility: CLINIC | Age: 67
End: 2022-08-18
Payer: MEDICARE

## 2022-08-19 ENCOUNTER — OFFICE VISIT (OUTPATIENT)
Dept: SLEEP MEDICINE | Facility: CLINIC | Age: 67
End: 2022-08-19
Payer: MEDICARE

## 2022-08-19 ENCOUNTER — PROCEDURE VISIT (OUTPATIENT)
Dept: UROLOGY | Facility: CLINIC | Age: 67
End: 2022-08-19
Payer: MEDICARE

## 2022-08-19 VITALS — DIASTOLIC BLOOD PRESSURE: 70 MMHG | SYSTOLIC BLOOD PRESSURE: 106 MMHG | HEART RATE: 71 BPM

## 2022-08-19 DIAGNOSIS — G47.33 OSA (OBSTRUCTIVE SLEEP APNEA): Primary | ICD-10-CM

## 2022-08-19 DIAGNOSIS — R31.0 GROSS HEMATURIA: ICD-10-CM

## 2022-08-19 LAB
BILIRUB SERPL-MCNC: NEGATIVE MG/DL
BLOOD URINE, POC: 250
CLARITY, POC UA: CLEAR
COLOR, POC UA: YELLOW
GLUCOSE UR QL STRIP: NORMAL
KETONES UR QL STRIP: NEGATIVE
LEUKOCYTE ESTERASE URINE, POC: NEGATIVE
NITRITE, POC UA: NEGATIVE
PH, POC UA: 6
PROTEIN, POC: NEGATIVE
SPECIFIC GRAVITY, POC UA: 1.01
UROBILINOGEN, POC UA: NEGATIVE

## 2022-08-19 PROCEDURE — 99213 OFFICE O/P EST LOW 20 MIN: CPT | Mod: 95,,, | Performed by: NURSE PRACTITIONER

## 2022-08-19 PROCEDURE — 99213 PR OFFICE/OUTPT VISIT, EST, LEVL III, 20-29 MIN: ICD-10-PCS | Mod: 95,,, | Performed by: NURSE PRACTITIONER

## 2022-08-19 PROCEDURE — 52000 CYSTOSCOPY: ICD-10-PCS | Mod: S$PBB,,, | Performed by: UROLOGY

## 2022-08-19 PROCEDURE — 52000 CYSTOURETHROSCOPY: CPT | Mod: PBBFAC,PN | Performed by: UROLOGY

## 2022-08-19 PROCEDURE — 52000 CYSTOURETHROSCOPY: CPT | Mod: S$PBB,,, | Performed by: UROLOGY

## 2022-08-19 PROCEDURE — 81002 URINALYSIS NONAUTO W/O SCOPE: CPT | Mod: PBBFAC,PN | Performed by: UROLOGY

## 2022-08-19 RX ORDER — LIDOCAINE HYDROCHLORIDE 20 MG/ML
JELLY TOPICAL
Status: COMPLETED | OUTPATIENT
Start: 2022-08-19 | End: 2022-08-19

## 2022-08-19 RX ORDER — SULFAMETHOXAZOLE AND TRIMETHOPRIM 800; 160 MG/1; MG/1
1 TABLET ORAL
Status: COMPLETED | OUTPATIENT
Start: 2022-08-19 | End: 2022-08-19

## 2022-08-19 RX ADMIN — LIDOCAINE HYDROCHLORIDE 1 ML: 20 JELLY TOPICAL at 10:08

## 2022-08-19 RX ADMIN — SULFAMETHOXAZOLE AND TRIMETHOPRIM 1 TABLET: 800; 160 TABLET ORAL at 10:08

## 2022-08-19 NOTE — PROGRESS NOTES
The patient location is: home  The chief complaint leading to consultation is: LIZETH  Visit type: TELE AUDIOVISUAL:[id number]    Face to Face time with patient:10 minutes of total time spent on the encounter, which includes face to face time and non-face to face time preparing to see the patient (eg, review of tests), Obtaining and/or reviewing separately obtained history, Documenting clinical information in the electronic or other health record, Independently interpreting results (not separately reported) and communicating results to the patient/family/caregiver, or Care coordination (not separately reported).Each patient to whom he or she provides medical services by telemedicine is:  (1) informed of the relationship between the physician and patient and the respective role of any other health care provider with respect to management of the patient; and (2) notified that he or she may decline to receive medical services by telemedicine and may withdraw from such care at any time.    Got new resmed airsense 11 from [employer]. Using qhs. Snoring resolved. Sleeps and feels better using PAP. Denies gas or belching with pressure delivery. Using Wisp mask w/o chin strap  Interrogation- 30davg 7+h/n AHI 0.4, 90% tile 17.3      Home Sleep Study: 12/11/14: +LIZETH, AHI 15, %time <90% = 9.9%      ASSESSMENT:  1. LIZETH, moderate . Symptoms remain improved, benefits from therapy. AHI<5. Excellent adherence    PLAN:  1. Continue autoPAP 14 - 20 cm, supplies via [facility]   2.Discussed effectiveness of therapy  rtc 2 yr, sooner if needed

## 2022-08-19 NOTE — PROCEDURES
Cystoscopy    Date/Time: 8/19/2022 8:00 AM  Performed by: Rocky Ibarra MD  Authorized by: April Langford NP     Consent Done?:  Yes (Written)  Prep: patient was prepped and draped in usual sterile fashion    Anesthesia:  Lidocaine jelly  Position:  Dorsal lithotomy  Anesthesia:  Lidocaine jelly  Preparation: Patient was prepped and draped in usual sterile fashion    Scope type:  Flexible cystoscope  External exam normal: Yes    Urethra normal: Yes    Bladder neck normal: Yes    Bladder normal: Yes (No tumors, lesions, or stones noted.  Normal bilateral UOs.)     Patient tolerated the procedure well with no immediate complications    Imaging  CT Urogram reviewed.  No lesions.  Bilateral renal stones    Impression:   Negative hematuria workup for malignancy  Recurrent UTIs  Gross hematuria  Nephrolithiasis    Plan:  -- Continue FU w/ April Langford NP, regarding management of UTIs  -- Reassured no evidence of malignancy  -- Discussed renal stones. Doubtful they are etiology for hematuria or UTIs, therefore treatment is elective. Would require ureteroscopy due to size and location. Will monitor for now w/ KUB every 6 mos.  -- FU as scheduled

## 2022-08-30 ENCOUNTER — PATIENT MESSAGE (OUTPATIENT)
Dept: SLEEP MEDICINE | Facility: CLINIC | Age: 67
End: 2022-08-30
Payer: MEDICARE

## 2022-09-14 ENCOUNTER — PATIENT MESSAGE (OUTPATIENT)
Dept: UROLOGY | Facility: CLINIC | Age: 67
End: 2022-09-14
Payer: MEDICARE

## 2022-09-14 ENCOUNTER — LAB VISIT (OUTPATIENT)
Dept: LAB | Facility: HOSPITAL | Age: 67
End: 2022-09-14
Payer: MEDICARE

## 2022-09-14 DIAGNOSIS — N39.0 RECURRENT UTI: ICD-10-CM

## 2022-09-14 DIAGNOSIS — R30.0 DYSURIA: ICD-10-CM

## 2022-09-14 PROCEDURE — 87088 URINE BACTERIA CULTURE: CPT | Performed by: NURSE PRACTITIONER

## 2022-09-14 PROCEDURE — 87086 URINE CULTURE/COLONY COUNT: CPT | Performed by: NURSE PRACTITIONER

## 2022-09-14 PROCEDURE — 87186 SC STD MICRODIL/AGAR DIL: CPT | Performed by: NURSE PRACTITIONER

## 2022-09-14 PROCEDURE — 87077 CULTURE AEROBIC IDENTIFY: CPT | Performed by: NURSE PRACTITIONER

## 2022-09-16 ENCOUNTER — TELEPHONE (OUTPATIENT)
Dept: UROLOGY | Facility: CLINIC | Age: 67
End: 2022-09-16
Payer: MEDICARE

## 2022-09-16 DIAGNOSIS — N39.0 RECURRENT UTI: Primary | ICD-10-CM

## 2022-09-16 LAB — BACTERIA UR CULT: ABNORMAL

## 2022-09-16 RX ORDER — CIPROFLOXACIN 500 MG/1
500 TABLET ORAL EVERY 12 HOURS
Qty: 14 TABLET | Refills: 0 | Status: SHIPPED | OUTPATIENT
Start: 2022-09-16 | End: 2022-09-23

## 2022-09-16 NOTE — TELEPHONE ENCOUNTER
"----- Message from Molly Marcos sent at 9/16/2022  2:01 PM CDT -----  Regarding: Consult/Advisory:      Name Of Caller:   Joy       Contact Preference?:     [phone]      What is the nature of the call?: Calling to speak w/ Gwyn Womack. Pt needs to know if the medication for their infection was sent to their pharmacy yet.        "Thank you for all that you do for our patients"     "

## 2022-10-03 ENCOUNTER — PATIENT MESSAGE (OUTPATIENT)
Dept: INTERNAL MEDICINE | Facility: CLINIC | Age: 67
End: 2022-10-03
Payer: MEDICARE

## 2022-10-03 RX ORDER — BENZONATATE 200 MG/1
200 CAPSULE ORAL 3 TIMES DAILY PRN
Qty: 30 CAPSULE | Refills: 0 | Status: SHIPPED | OUTPATIENT
Start: 2022-10-03 | End: 2022-10-13

## 2022-10-03 RX ORDER — PROMETHAZINE HYDROCHLORIDE AND DEXTROMETHORPHAN HYDROBROMIDE 6.25; 15 MG/5ML; MG/5ML
5 SYRUP ORAL EVERY 8 HOURS PRN
Qty: 150 ML | Refills: 0 | Status: SHIPPED | OUTPATIENT
Start: 2022-10-03 | End: 2022-10-13

## 2022-10-03 NOTE — TELEPHONE ENCOUNTER
Monoclonal antibodies are used up to 7 days from onset of symptoms.      Tessalon 200mg tid prn. Phenergan dm bid prn/

## 2022-10-19 ENCOUNTER — LAB VISIT (OUTPATIENT)
Dept: LAB | Facility: HOSPITAL | Age: 67
End: 2022-10-19
Attending: INTERNAL MEDICINE
Payer: MEDICARE

## 2022-10-19 DIAGNOSIS — D58.2 OTHER HEMOGLOBINOPATHIES: ICD-10-CM

## 2022-10-19 DIAGNOSIS — Z00.00 ANNUAL PHYSICAL EXAM: ICD-10-CM

## 2022-10-19 DIAGNOSIS — D69.6 THROMBOCYTOPENIA: ICD-10-CM

## 2022-10-19 DIAGNOSIS — R71.8 ELEVATED RED BLOOD CELL COUNT: ICD-10-CM

## 2022-10-19 DIAGNOSIS — I10 PRIMARY HYPERTENSION: ICD-10-CM

## 2022-10-19 LAB
BASOPHILS # BLD AUTO: 0.1 K/UL (ref 0–0.2)
BASOPHILS NFR BLD: 1.2 % (ref 0–1.9)
CHOLEST SERPL-MCNC: 171 MG/DL (ref 120–199)
CHOLEST/HDLC SERPL: 3.9 {RATIO} (ref 2–5)
DIFFERENTIAL METHOD: ABNORMAL
EOSINOPHIL # BLD AUTO: 0.3 K/UL (ref 0–0.5)
EOSINOPHIL NFR BLD: 3.9 % (ref 0–8)
ERYTHROCYTE [DISTWIDTH] IN BLOOD BY AUTOMATED COUNT: 15.6 % (ref 11.5–14.5)
HCT VFR BLD AUTO: 46.9 % (ref 37–48.5)
HDLC SERPL-MCNC: 44 MG/DL (ref 40–75)
HDLC SERPL: 25.7 % (ref 20–50)
HGB BLD-MCNC: 14.5 G/DL (ref 12–16)
IMM GRANULOCYTES # BLD AUTO: 0.03 K/UL (ref 0–0.04)
IMM GRANULOCYTES NFR BLD AUTO: 0.4 % (ref 0–0.5)
LDLC SERPL CALC-MCNC: 95 MG/DL (ref 63–159)
LYMPHOCYTES # BLD AUTO: 2.7 K/UL (ref 1–4.8)
LYMPHOCYTES NFR BLD: 32.4 % (ref 18–48)
MCH RBC QN AUTO: 26.3 PG (ref 27–31)
MCHC RBC AUTO-ENTMCNC: 30.9 G/DL (ref 32–36)
MCV RBC AUTO: 85 FL (ref 82–98)
MONOCYTES # BLD AUTO: 0.9 K/UL (ref 0.3–1)
MONOCYTES NFR BLD: 10.4 % (ref 4–15)
NEUTROPHILS # BLD AUTO: 4.3 K/UL (ref 1.8–7.7)
NEUTROPHILS NFR BLD: 51.7 % (ref 38–73)
NONHDLC SERPL-MCNC: 127 MG/DL
NRBC BLD-RTO: 0 /100 WBC
PATH REV BLD -IMP: NORMAL
PLATELET # BLD AUTO: 164 K/UL (ref 150–450)
PMV BLD AUTO: 11.7 FL (ref 9.2–12.9)
RBC # BLD AUTO: 5.52 M/UL (ref 4–5.4)
TRIGL SERPL-MCNC: 160 MG/DL (ref 30–150)
WBC # BLD AUTO: 8.23 K/UL (ref 3.9–12.7)

## 2022-10-19 PROCEDURE — 85025 COMPLETE CBC W/AUTO DIFF WBC: CPT | Performed by: INTERNAL MEDICINE

## 2022-10-19 PROCEDURE — 80061 LIPID PANEL: CPT | Performed by: INTERNAL MEDICINE

## 2022-10-19 PROCEDURE — 36415 COLL VENOUS BLD VENIPUNCTURE: CPT | Mod: PO | Performed by: INTERNAL MEDICINE

## 2022-10-19 PROCEDURE — 85060 BLOOD SMEAR INTERPRETATION: CPT | Mod: ,,, | Performed by: PATHOLOGY

## 2022-10-19 PROCEDURE — 85060 PATHOLOGIST REVIEW: ICD-10-PCS | Mod: ,,, | Performed by: PATHOLOGY

## 2022-10-19 PROCEDURE — 82668 ASSAY OF ERYTHROPOIETIN: CPT | Performed by: INTERNAL MEDICINE

## 2022-10-20 LAB — PATH REV BLD -IMP: NORMAL

## 2022-10-21 LAB
HB ELECTROPHORESIS INTERP CANCEL: NORMAL
HB ELECTROPHORESIS INTERPRETATION: NORMAL
HGB A MFR BLD ELPH: 97.7 % (ref 95.8–98)
HGB A2 MFR BLD: 2.3 % (ref 2–3.3)
HGB A2+XXX MFR BLD ELPH: NORMAL %
HGB F MFR BLD: 0 % (ref 0–0.9)
HGB XXX MFR BLD ELPH: NORMAL %
HPLC HB VARIANT: NORMAL

## 2022-10-24 LAB — EPO SERPL-ACNC: 10.1 MIU/ML (ref 2.6–18.5)

## 2022-11-14 ENCOUNTER — TELEPHONE (OUTPATIENT)
Dept: UROGYNECOLOGY | Facility: CLINIC | Age: 67
End: 2022-11-14
Payer: MEDICARE

## 2022-11-14 DIAGNOSIS — T86.898: Primary | ICD-10-CM

## 2022-11-14 DIAGNOSIS — N02.9 PERSISTENT HEMATURIA: ICD-10-CM

## 2022-11-14 DIAGNOSIS — R31.9: Primary | ICD-10-CM

## 2022-11-14 NOTE — TELEPHONE ENCOUNTER
----- Message from Shaneka Slaughter MA sent at 11/14/2022  1:45 PM CST -----  Regarding: appointment  Good afternoon,   I work with NP Loni Devlin.   This is her mom- she needs appt with UroGyn for evaluation of persistent blood in the urine.   She has seen urology and had imaging and cystoscope.   Please contact her to schedule an appt.  If need referral let me know and will get Dr. Bhatti to put in.   Thank you!  Shaneka

## 2022-11-17 ENCOUNTER — OFFICE VISIT (OUTPATIENT)
Dept: UROGYNECOLOGY | Facility: CLINIC | Age: 67
End: 2022-11-17
Payer: MEDICARE

## 2022-11-17 VITALS
SYSTOLIC BLOOD PRESSURE: 150 MMHG | BODY MASS INDEX: 47.09 KG/M2 | WEIGHT: 293 LBS | HEIGHT: 66 IN | DIASTOLIC BLOOD PRESSURE: 90 MMHG

## 2022-11-17 DIAGNOSIS — N02.9 PERSISTENT HEMATURIA: ICD-10-CM

## 2022-11-17 DIAGNOSIS — M62.89 HIGH-TONE PELVIC FLOOR DYSFUNCTION: ICD-10-CM

## 2022-11-17 DIAGNOSIS — N95.2 ATROPHIC VAGINITIS: ICD-10-CM

## 2022-11-17 DIAGNOSIS — N39.46 MIXED URGE AND STRESS INCONTINENCE: Primary | ICD-10-CM

## 2022-11-17 DIAGNOSIS — R31.0 GROSS HEMATURIA: ICD-10-CM

## 2022-11-17 PROBLEM — N94.89 HIGH-TONE PELVIC FLOOR DYSFUNCTION: Status: ACTIVE | Noted: 2022-11-17

## 2022-11-17 PROCEDURE — 99205 OFFICE O/P NEW HI 60 MIN: CPT | Mod: S$PBB,,, | Performed by: OBSTETRICS & GYNECOLOGY

## 2022-11-17 PROCEDURE — 99205 PR OFFICE/OUTPT VISIT, NEW, LEVL V, 60-74 MIN: ICD-10-PCS | Mod: S$PBB,,, | Performed by: OBSTETRICS & GYNECOLOGY

## 2022-11-17 PROCEDURE — 87088 URINE BACTERIA CULTURE: CPT | Performed by: OBSTETRICS & GYNECOLOGY

## 2022-11-17 PROCEDURE — 99999 PR PBB SHADOW E&M-EST. PATIENT-LVL V: CPT | Mod: PBBFAC,,, | Performed by: OBSTETRICS & GYNECOLOGY

## 2022-11-17 PROCEDURE — 87186 SC STD MICRODIL/AGAR DIL: CPT | Performed by: OBSTETRICS & GYNECOLOGY

## 2022-11-17 PROCEDURE — 99999 PR PBB SHADOW E&M-EST. PATIENT-LVL V: ICD-10-PCS | Mod: PBBFAC,,, | Performed by: OBSTETRICS & GYNECOLOGY

## 2022-11-17 PROCEDURE — 87077 CULTURE AEROBIC IDENTIFY: CPT | Performed by: OBSTETRICS & GYNECOLOGY

## 2022-11-17 PROCEDURE — 87086 URINE CULTURE/COLONY COUNT: CPT | Performed by: OBSTETRICS & GYNECOLOGY

## 2022-11-17 PROCEDURE — 99215 OFFICE O/P EST HI 40 MIN: CPT | Mod: PBBFAC | Performed by: OBSTETRICS & GYNECOLOGY

## 2022-11-17 RX ORDER — CONJUGATED ESTROGENS 0.62 MG/G
CREAM VAGINAL
Qty: 45 G | Refills: 12 | Status: SHIPPED | OUTPATIENT
Start: 2022-11-17 | End: 2024-01-04

## 2022-11-17 NOTE — PATIENT INSTRUCTIONS
1. Vaginal atrophy (dryness):  Use 1 gram of estrogen cream in vagina nightly x 2 weeks, then twice a week thereafter.      2.  Recurrent UTI   --UA and Culture  --Vaginal estrogen has been shown to decrease the recurrence of urinary tract infections in post menopausal women  --Change pads often: Q 2-3 hours and add barrier cream daily (Orville's butt paste vs dessitin)   --Add Cranberry supplementation and Probiotics   --Discussed long term treatment options including: Antibiotic ppx vs self treatment    3.  Mixed urinary incontinence, urge > stress: history of previous sling   --Bladder diary for 3-7 days, write the number of times you go to the rest room and associated symptoms of urgency or leakage.   --Empty bladder every 3 hours.  Empty well: wait a minute, lean forward on toilet.    --Avoid dietary irritants (see sheet).  Keep diary x 3-5 days to determine your irritants.  --KEGELS: do 10 in AM and 10 in PM, holding each x 10 seconds.  When you feel urge to go, STOP, KEGEL, and when urge has passed, then go to bathroom.  Start pelvic floor PT.     --URGE: Consider Ditropan 10 mg daily or Myrbetriq 50 mg daily.  SE profile reviewed.    Takes 2-4 weeks to see if will have effect.  For dry mouth: get sour, sugar free lozenge or gum.    --STRESS:  Non surgical options: Pessary vs. Impressa vs Rivive - which represent urethral and bladder support devices; Surgical options including 1.  mid urethral sling; retropubic, transobturator vs single incision 2. Fascial slings 3. Hickey procedure 4. Periurethral bulking     4. To follow up with Dr. Ibarra

## 2022-11-17 NOTE — PROGRESS NOTES
Subjective:       Patient ID: Joy Singer is a 67 y.o. female.    Chief Complaint:  Hematuria      History of Present Illness  HPI 67 Y O F  has a past medical history of Breast cyst, Carotid artery occlusion, Chronic back pain, Chronic bilateral low back pain without sciatica (11/08/2016), Colon polyps (2016), COVID-19 (09/27/2022), Fibrocystic breast, HA (headache), Hyperlipidemia, Morbid obesity with BMI of 60.0-69.9, adult, Obstructive sleep apnea (adult) (pediatric), and Pseudotumor cerebri.  She was referred for gross hematuria. She notices blood in her urine especially at night when she sleeps with out underwear,  she uses a towel to facilitate getting to the restroom without leakage on the floor. She states that she notices pink from time to time on her undergarment. She had a hysterectomy with concomitant sling. She denied complications post operatively. She denied pain with intercourse but states she rarely has intercourse. She does not think that the bleeding is coming from the vagina but was recommended by a family member to come for evaluation.  She has seen  for UTI's in the past with essentially a negative cysto and CT urogram which demonstrated bilateral stones. Dr. Ibarra recommended vaginal estrogen but the patient has not started it.     [facility]    Question 11/17/2022  8:18 AM CST - Filed by Guerita Fierro MA   General Urogynecology: Are you experiencing the following?    Dysuria (painful urination) No   Nocturia:  waking up at night to empty your bladder  Yes   If you answered yes to the previous question, how many times does this happen per night? 3-4   Enuresis (urine loss during sleep) No   Dribbling urine after you urinate No   Hematuria (urine appears red) Yes   Type of stream Weak   Urinary frequency: How often a day are you going to the bathroom per day?  10-15   Urinary Tract Infections: How many Urinary Tract Infections have you had in the past year? Three (3)  "infections in a year   If you have had a UTI in the past year, what treatments have you had so far?  Prophylactic antibiotics   Urinary Incontinence (General): Are you experiencing the following?    Past consultation for incontinence: Have you ever seen someone for the evaluation of incontinence? Yes   If you answered yes to the previous question, please select all the therapies you have tried.  Kegals   Please note the effectiveness of the therapies.    Need to wear protection to keep clothes dry  Yes   If you answered yes to the previous question, please desiree the protection you use.  Pads   If you wear protection, how much wetness is typically on each pad? Light   If you wear protection, how often do you have to change per day, if applicable?  9-10   Stress Symptoms: Are you experiencing the following?    Leakage of urine with cough, laugh and/or sneeze Yes   If you answered yes to the previous question, what is the frequency in days, weeks and/or months? Daily   Leakage of urine with sex No   Leakage of urine with bending/ lifting Yes   Leakage of urine with briskly walking or jogging No   If you lose urine for any other reason not previously mentioned, please note it below, if applicable.     Urge Symptoms: Are you experiencing the following?    Urgency ("got to go" feeling) Yes   Urge: How frequently do you feel an urge to urinate (feeling like you "gotta go" to the bathroom and can't wait) Daily   Do you experience a leakage of urine when you have a feeling of urgency?  Yes   Leakage of urine when unaware No   Past use of anticholinergics (medications used to treat overactive bladder) No   If you answered yes to the previous question, please desiree the anticholinergics you have used:     Have you ever used Mirbetriq (aka Mirabegron)?  No   Prolapse Symptoms: Are you experiencing any of the following?     Falling out/ Bulging/ Heaviness in the vagina No   Vaginal/ Abdominal Pain/ Pressure No   Need to strain/ " Push to void No   Need to wait on the toilet before you void No   Unusual position to urinate (using your hands to push back the vaginal bulge) No   Sensation of incomplete emptying Yes   Past use of pessary device No   If you answered yes to the previous question, please list the devices you have used below.     Bowel Symptoms: Are you experiencing any of the following?    Constipation Yes   Diarrhea  No   Hematochezia (bloody stool) No   Incomplete evacuation of stool Yes   Involuntary loss of formed stool No   Fecal smearing/urgency No   Involuntary loss of gas No   Vaginal Symptoms: Are you experiencing any of the following?     Abnormal vaginal bleeding  No   Vaginal dryness Yes   Sexually active  No   Dyspareunia (painful intercourse) No   Estrogen use  No       GYN & OB History  Patient's last menstrual period was 2004.   Date of Last Pap: No result found    OB History    Para Term  AB Living   2 2 2         SAB IAB Ectopic Multiple Live Births                  # Outcome Date GA Lbr Carlos/2nd Weight Sex Delivery Anes PTL Lv   2 Term            1 Term                Review of Systems  Review of Systems   Constitutional: Negative.  Negative for activity change, appetite change, chills, diaphoresis, fatigue, fever and unexpected weight change.   HENT: Negative.     Eyes: Negative.    Respiratory: Negative.  Negative for cough.    Cardiovascular: Negative.    Gastrointestinal:  Positive for constipation. Negative for abdominal distention, abdominal pain, anal bleeding, blood in stool, diarrhea, nausea, rectal pain and vomiting.   Endocrine: Negative.    Genitourinary:  Positive for hematuria. Negative for decreased urine volume, difficulty urinating, dyspareunia, dysuria, enuresis, flank pain, frequency, genital sores, menstrual problem, pelvic pain, urgency, vaginal bleeding, vaginal discharge and vaginal pain.   Musculoskeletal:  Negative for back pain.   Skin:  Negative for color change,  pallor, rash and wound.   Allergic/Immunologic: Negative for environmental allergies, food allergies and immunocompromised state.   Hematological:  Negative for adenopathy. Does not bruise/bleed easily.   Psychiatric/Behavioral:  Negative for agitation, behavioral problems, confusion and sleep disturbance.          Objective:     Physical Exam   Constitutional: She is oriented to person, place, and time. She appears well-developed.   HENT:   Head: Normocephalic and atraumatic.   Eyes: Conjunctivae and EOM are normal.   Cardiovascular: Normal rate, regular rhythm, S1 normal, S2 normal, normal heart sounds and intact distal pulses.   Pulmonary/Chest: Effort normal and breath sounds normal. She exhibits no tenderness.   Abdominal: Soft. Bowel sounds are normal. She exhibits no distension and no mass. There is no splenomegaly or hepatomegaly. There is no abdominal tenderness. There is no rigidity, no rebound and no guarding. Hernia confirmed negative in the right inguinal area and confirmed negative in the left inguinal area.   Genitourinary: Pelvic exam was performed with patient supine. Rectum normal, vagina normal, skenes normal and bartholins normal. Right labia normal and left labia normal. Urethra exhibits hypermobility. Urethra exhibits no urethral caruncle, no urethral diverticulum and no urethral mass. Right bartholin is not enlarged and not tender. Left bartholin is not enlarged and not tender. Rectal exam shows resting tone normal and active tone normal. Rectal exam shows no external hemorrhoid, no fissure, no tenderness, anal tone normal and no dovetailing. Guaiac negative stool. There is atrophy and lavator tenderness in the vagina. No foreign body, tenderness, bleeding, unspecified prolapse of vaginal walls, fistula or mesh exposure in the vagina. Right adnexum displays no tenderness. Left adnexum displays no tenderness. Uterus is absent.   PVR: 25 ML  Empty cough stress test: Negative.  Kegel: 2/5      Genitourinary Comments: Morbid obesity unable to feel the vaginal cuff due to pain  Palpation of suburethral tissue failed to demonstrate mesh erosion but she was tender.       Musculoskeletal:         General: Normal range of motion.      Cervical back: Normal range of motion and neck supple.   Neurological: She is alert and oriented to person, place, and time. She has normal strength. Cranial nerves II through XII intact. No cranial nerve deficit.   Skin: Skin is warm and dry.   Psychiatric: She has a normal mood and affect. Her speech is normal and behavior is normal. Judgment normal.        Assessment:        1. Mixed urge and stress incontinence    2. Persistent hematuria    3. High-tone pelvic floor dysfunction    4. Gross hematuria    5. Atrophic vaginitis           Plan:      1. Vaginal atrophy (dryness):  Use 1 gram of estrogen cream in vagina nightly x 2 weeks, then twice a week thereafter.      2.  Recurrent UTI -   --UA and Culture  --Add Cranberry supplementation and Probiotics   --Discussed long term treatment options including: Antibiotic ppx vs self treatment    3.  Mixed urinary incontinence, urge > stress: history of previous sling   --Bladder diary for 3-7 days, write the number of times you go to the rest room and associated symptoms of urgency or leakage.   --Empty bladder every 3 hours.  Empty well: wait a minute, lean forward on toilet.    --Avoid dietary irritants (see sheet).  Keep diary x 3-5 days to determine your irritants.  --KEGELS: do 10 in AM and 10 in PM, holding each x 10 seconds.  When you feel urge to go, STOP, KEGEL, and when urge has passed, then go to bathroom.  Start pelvic floor PT.     --URGE: Consider Ditropan 10 mg daily or Myrbetriq 50 mg daily.  SE profile reviewed.    Takes 2-4 weeks to see if will have effect.  For dry mouth: get sour, sugar free lozenge or gum.    --STRESS:  Non surgical options: Pessary vs. Impressa vs Rivive - which represent urethral and bladder  support devices; Surgical options including 1.  mid urethral sling; retropubic, transobturator vs single incision 2. Fascial slings 3. Hickey procedure 4. Periurethral bulking     4. Gross hematuria   To follow with Dr. Ibarra in a few months for ureteroscopy     5.  High tone pelvic floor dysfunction  Starting pelvic floor PT     Approximately 65  minutes of total time spent in consult, 75 % in discussion. This includes face to face time and non-face to face time preparing to see the patient, obtaining and/or reviewing separately obtained history, documenting clinical information in the electronic or other health record, independently interpreting results (not separately reported) and communicating results to the patient/family/caregiver, or care coordination (not separately reported).        Thank you for requesting consultation of your patient.  I look forward to participating in her care.    Mireya Tucker DO  Female Pelvic Medicine and Reconstructive Surgery  [facility] [place], [place]

## 2022-11-20 ENCOUNTER — PATIENT MESSAGE (OUTPATIENT)
Dept: UROGYNECOLOGY | Facility: CLINIC | Age: 67
End: 2022-11-20
Payer: MEDICARE

## 2022-11-20 DIAGNOSIS — N30.00 ACUTE CYSTITIS WITHOUT HEMATURIA: Primary | ICD-10-CM

## 2022-11-20 LAB — BACTERIA UR CULT: ABNORMAL

## 2022-11-21 ENCOUNTER — PATIENT MESSAGE (OUTPATIENT)
Dept: UROGYNECOLOGY | Facility: CLINIC | Age: 67
End: 2022-11-21
Payer: MEDICARE

## 2022-11-21 RX ORDER — NITROFURANTOIN 25; 75 MG/1; MG/1
100 CAPSULE ORAL 2 TIMES DAILY
Qty: 10 CAPSULE | Refills: 0 | Status: SHIPPED | OUTPATIENT
Start: 2022-11-21 | End: 2022-11-26

## 2022-12-06 ENCOUNTER — OFFICE VISIT (OUTPATIENT)
Dept: INTERNAL MEDICINE | Facility: CLINIC | Age: 67
End: 2022-12-06
Payer: MEDICARE

## 2022-12-06 VITALS
HEART RATE: 82 BPM | HEIGHT: 66 IN | BODY MASS INDEX: 47.09 KG/M2 | OXYGEN SATURATION: 96 % | WEIGHT: 293 LBS | TEMPERATURE: 98 F | SYSTOLIC BLOOD PRESSURE: 130 MMHG | DIASTOLIC BLOOD PRESSURE: 82 MMHG | RESPIRATION RATE: 18 BRPM

## 2022-12-06 DIAGNOSIS — R73.03 PREDIABETES: ICD-10-CM

## 2022-12-06 DIAGNOSIS — I10 ESSENTIAL HYPERTENSION: Primary | ICD-10-CM

## 2022-12-06 DIAGNOSIS — E66.01 MORBID OBESITY WITH BMI OF 60.0-69.9, ADULT: ICD-10-CM

## 2022-12-06 PROCEDURE — 99999 PR PBB SHADOW E&M-EST. PATIENT-LVL V: ICD-10-PCS | Mod: PBBFAC,,, | Performed by: INTERNAL MEDICINE

## 2022-12-06 PROCEDURE — 99999 PR PBB SHADOW E&M-EST. PATIENT-LVL V: CPT | Mod: PBBFAC,,, | Performed by: INTERNAL MEDICINE

## 2022-12-06 PROCEDURE — 99215 OFFICE O/P EST HI 40 MIN: CPT | Mod: PBBFAC,PO | Performed by: INTERNAL MEDICINE

## 2022-12-06 PROCEDURE — 99213 PR OFFICE/OUTPT VISIT, EST, LEVL III, 20-29 MIN: ICD-10-PCS | Mod: S$PBB,,, | Performed by: INTERNAL MEDICINE

## 2022-12-06 PROCEDURE — 99213 OFFICE O/P EST LOW 20 MIN: CPT | Mod: S$PBB,,, | Performed by: INTERNAL MEDICINE

## 2022-12-06 NOTE — PROGRESS NOTES
Subjective:     Joy Singer is a 67 y.o. female who presents for   Chief Complaint   Patient presents with    Follow-up     4 Mos Follow up    Hypertension    Pre-diabetes       HPI    Hypertension: The patient has been no medication side effects noted, no chest pain on exertion, no dyspnea on exertion, and no swelling of ankles. She took amlodipine, chlorthalidone and candesartan in the past.  But her blood pressure has been controlled but she continues to check it.    BP Readings from Last 3 Encounters:   12/06/22 130/82   11/17/22 (!) 150/90   08/19/22 106/70       Pre-diabetes: Patient has a family history of diabetes. States she has gained 20 lbs since the last appointment. The patient has made changes to diet. The patient has not made any changes to exercise routine due to chronic knee and shoulder pain She has never taken medications to reduce the risk for progression to diabetes. She denies excessive urination, no excessive thirst and no excessive hunger.     Lab Results   Component Value Date    HGBA1C 5.6 06/01/2022     (H) 07/20/2022     Body mass index is 65.94 kg/m².  Wt Readings from Last 3 Encounters:   12/06/22 (!) 185.3 kg (408 lb 8.2 oz)   11/17/22 (!) 174.2 kg (384 lb)   07/07/22 (!) 174.2 kg (384 lb 0.7 oz)   - lost weight on her own but re-gained it before she returned  - has been eating when bored and she will overeat        Review of Systems   Constitutional:  Positive for unexpected weight change. Negative for chills, diaphoresis and fever.   HENT:  Negative for congestion, ear pain and sinus pressure.    Eyes:  Negative for discharge and visual disturbance.   Respiratory:  Negative for cough and shortness of breath.    Cardiovascular:  Negative for chest pain and palpitations.   Gastrointestinal:  Negative for abdominal pain, constipation, diarrhea, nausea and vomiting.   Endocrine: Negative for polydipsia.   Musculoskeletal:  Positive for arthralgias (knees and sometimes  the shoulders). Negative for myalgias.   Skin:  Negative for rash and wound.   Neurological:  Negative for dizziness, tremors and headaches.        Objective:     Physical Exam  Vitals reviewed.   Constitutional:       General: She is awake. She is not in acute distress.     Appearance: Normal appearance. She is well-developed and well-groomed.   HENT:      Head: Normocephalic and atraumatic.      Right Ear: Hearing and external ear normal.      Left Ear: Hearing and external ear normal.      Nose: Nose normal. No congestion.      Mouth/Throat:      Mouth: Mucous membranes are moist.   Eyes:      General: Lids are normal. Vision grossly intact. Gaze aligned appropriately.   Cardiovascular:      Rate and Rhythm: Normal rate and regular rhythm.      Heart sounds: Normal heart sounds. No murmur heard.  Pulmonary:      Effort: Pulmonary effort is normal.      Breath sounds: Normal breath sounds. No decreased breath sounds or wheezing.   Abdominal:      General: Bowel sounds are normal. There is no distension.   Musculoskeletal:         General: Normal range of motion.      Cervical back: Normal range of motion.      Right lower leg: No edema.      Left lower leg: No edema.   Skin:     General: Skin is warm and dry.      Findings: No lesion or rash.   Neurological:      Mental Status: She is alert and oriented to person, place, and time.      Sensory: Sensation is intact.      Gait: Gait abnormal.   Psychiatric:         Attention and Perception: Attention normal.         Mood and Affect: Mood normal.         Behavior: Behavior is cooperative.          Assessment:      1. Essential hypertension    2. Prediabetes    3. Morbid obesity with BMI of 60.0-69.9, adult           Plan:     1. Essential hypertension  - BP is elevated today but reports compliance, monitor BP readings     2. Prediabetes  - reduce intake of carbs and sugars, increase physical activity    3. Morbid obesity with BMI of 60.0-69.9, adult  - discussed  returning to previous eating habits that resulted in weight loss, discussed options for seated exercises at home  - reviewed a few medication options but will try to improve eating habits and consider treatment at next appointment    RTC in 3 months for follow-up or sooner if needed    __________________________    Faiza Kuhn MD, PharmD  [facility]- [facility]  American Board of Obesity Medicine diplomate  Office [phone]

## 2022-12-14 ENCOUNTER — PATIENT MESSAGE (OUTPATIENT)
Dept: INTERNAL MEDICINE | Facility: CLINIC | Age: 67
End: 2022-12-14
Payer: MEDICARE

## 2023-01-06 ENCOUNTER — PATIENT MESSAGE (OUTPATIENT)
Dept: UROGYNECOLOGY | Facility: CLINIC | Age: 68
End: 2023-01-06
Payer: MEDICARE

## 2023-01-17 ENCOUNTER — HOSPITAL ENCOUNTER (OUTPATIENT)
Dept: RADIOLOGY | Facility: HOSPITAL | Age: 68
Discharge: HOME | End: 2023-01-17
Attending: NURSE PRACTITIONER
Payer: MEDICARE

## 2023-01-17 VITALS — BODY MASS INDEX: 47.09 KG/M2 | WEIGHT: 293 LBS | HEIGHT: 66 IN

## 2023-01-17 DIAGNOSIS — Z80.3 FAMILY HISTORY OF BREAST CANCER: ICD-10-CM

## 2023-01-17 DIAGNOSIS — Z12.31 ENCOUNTER FOR SCREENING MAMMOGRAM FOR MALIGNANT NEOPLASM OF BREAST: ICD-10-CM

## 2023-01-17 DIAGNOSIS — Z91.89 AT HIGH RISK FOR BREAST CANCER: ICD-10-CM

## 2023-01-17 PROCEDURE — 77063 BREAST TOMOSYNTHESIS BI: CPT | Mod: 26,,, | Performed by: RADIOLOGY

## 2023-01-17 PROCEDURE — 77067 SCR MAMMO BI INCL CAD: CPT | Mod: TC

## 2023-01-17 PROCEDURE — 77067 MAMMO DIGITAL SCREENING BILAT WITH TOMO: ICD-10-PCS | Mod: 26,,, | Performed by: RADIOLOGY

## 2023-01-17 PROCEDURE — 77067 SCR MAMMO BI INCL CAD: CPT | Mod: 26,,, | Performed by: RADIOLOGY

## 2023-01-17 PROCEDURE — 77063 MAMMO DIGITAL SCREENING BILAT WITH TOMO: ICD-10-PCS | Mod: 26,,, | Performed by: RADIOLOGY

## 2023-03-06 ENCOUNTER — LAB VISIT (OUTPATIENT)
Dept: LAB | Facility: HOSPITAL | Age: 68
End: 2023-03-06
Payer: MEDICARE

## 2023-03-06 DIAGNOSIS — R30.0 DYSURIA: ICD-10-CM

## 2023-03-06 DIAGNOSIS — N39.0 RECURRENT UTI: ICD-10-CM

## 2023-03-06 PROCEDURE — 87086 URINE CULTURE/COLONY COUNT: CPT | Performed by: NURSE PRACTITIONER

## 2023-03-07 ENCOUNTER — PATIENT MESSAGE (OUTPATIENT)
Dept: UROLOGY | Facility: CLINIC | Age: 68
End: 2023-03-07
Payer: MEDICARE

## 2023-03-07 LAB — BACTERIA UR CULT: NO GROWTH

## 2023-03-09 DIAGNOSIS — R30.0 DYSURIA: Primary | ICD-10-CM

## 2023-03-09 RX ORDER — METHENAMINE, SODIUM PHOSPHATE, MONOBASIC, METHYLENE BLUE, AND HYOSCYAMINE SULFATE 81.6; 40.8; 10.8; .12 MG/1; MG/1; MG/1; MG/1
1 TABLET, COATED ORAL EVERY 6 HOURS PRN
Qty: 30 TABLET | Refills: 5 | Status: SHIPPED | OUTPATIENT
Start: 2023-03-09 | End: 2023-09-05

## 2023-03-13 ENCOUNTER — OFFICE VISIT (OUTPATIENT)
Dept: INTERNAL MEDICINE | Facility: CLINIC | Age: 68
End: 2023-03-13
Payer: MEDICARE

## 2023-03-13 ENCOUNTER — TELEPHONE (OUTPATIENT)
Dept: INTERNAL MEDICINE | Facility: CLINIC | Age: 68
End: 2023-03-13

## 2023-03-13 ENCOUNTER — TELEPHONE (OUTPATIENT)
Dept: INTERNAL MEDICINE | Facility: CLINIC | Age: 68
End: 2023-03-13
Payer: MEDICARE

## 2023-03-13 DIAGNOSIS — I10 ESSENTIAL HYPERTENSION: Primary | ICD-10-CM

## 2023-03-13 DIAGNOSIS — N18.32 CHRONIC KIDNEY DISEASE, STAGE 3B: ICD-10-CM

## 2023-03-13 DIAGNOSIS — M17.0 BILATERAL PRIMARY OSTEOARTHRITIS OF KNEE: ICD-10-CM

## 2023-03-13 DIAGNOSIS — R73.03 PREDIABETES: ICD-10-CM

## 2023-03-13 DIAGNOSIS — E66.01 MORBID OBESITY WITH BMI OF 60.0-69.9, ADULT: ICD-10-CM

## 2023-03-13 DIAGNOSIS — I10 ESSENTIAL HYPERTENSION: ICD-10-CM

## 2023-03-13 DIAGNOSIS — E23.6 EMPTY SELLA SYNDROME: ICD-10-CM

## 2023-03-13 DIAGNOSIS — Z00.00 ANNUAL PHYSICAL EXAM: Primary | ICD-10-CM

## 2023-03-13 DIAGNOSIS — I70.0 AORTIC ATHEROSCLEROSIS: ICD-10-CM

## 2023-03-13 PROBLEM — D69.6 THROMBOCYTOPENIA: Status: RESOLVED | Noted: 2018-05-21 | Resolved: 2023-03-13

## 2023-03-13 PROBLEM — E87.6 HYPOKALEMIA: Status: RESOLVED | Noted: 2022-02-13 | Resolved: 2023-03-13

## 2023-03-13 PROBLEM — R16.0 HEPATOMEGALY: Status: ACTIVE | Noted: 2023-03-13

## 2023-03-13 PROBLEM — R73.9 ELEVATED BLOOD SUGAR: Status: RESOLVED | Noted: 2019-07-10 | Resolved: 2023-03-13

## 2023-03-13 PROCEDURE — 99214 PR OFFICE/OUTPT VISIT, EST, LEVL IV, 30-39 MIN: ICD-10-PCS | Mod: 95,,, | Performed by: INTERNAL MEDICINE

## 2023-03-13 PROCEDURE — 99214 OFFICE O/P EST MOD 30 MIN: CPT | Mod: 95,,, | Performed by: INTERNAL MEDICINE

## 2023-03-13 NOTE — TELEPHONE ENCOUNTER
----- Message from Zaira Hernandez sent at 3/13/2023  9:35 AM CDT -----  Contact: Pt @[phone]  [email or web address]     Patient is calling for Medical Advice regarding:    Pt states that she is still waiting on the provider to connect to her virtual appt that was for 9:00.     Would like response via [employer]:  call     Comments:   Pt is requesting a call back to advise on her appt.

## 2023-03-13 NOTE — PROGRESS NOTES
Primary Care Telemedicine Note    The patient location is:  Patient Home     The chief complaint leading to consultation is: HTN, knee pain, obesity, pre-diabetes    Total time spent with patient: 20 minutes    Visit type: Virtual visit with synchronous audio only and video      Each patient to whom he or she provides medical services by telemedicine is:  (1) informed of the relationship between the physician and patient and the respective role of any other health care provider with respect to management of the patient; and (2) notified that he or she may decline to receive medical services by telemedicine and may withdraw from such care at any time.        Subjective:        Patient ID: Joy Singer is a 67 y.o. female.    Chief Complaint: Hypertension, Knee Pain, and Pre-diabetes      HPI    Hypertension: The patient has been taking medications as instructed, no medication side effects noted, no chest pain on exertion, no dyspnea on exertion. Home /60-70's.    BP Readings from Last 3 Encounters:   12/06/22 130/82   11/17/22 (!) 150/90   08/19/22 106/70       Knee Pain: The patient presents with knee pain involving both knees. Onset was sudden, not related to any specific activity. Inciting event: none known. Current symptoms include: swelling. Pain is aggravated by any weight bearing. Patient has had prior knee problems. Evaluation to date: Ortho consult: with Dr. Armendariz who cannot treat her with IA injections because they no longer work . The best treatment would be knee replacement but it cannot be done safely until she loses weight.     Pre-diabetes: Patient has a family history of diabetes.  The patient has not made any changes to diet. The patient has not made any changes to exercise routine due to chronic knee pain. She has never taken medications to reduce the risk for progression to diabetes. She denies excessive urination, no excessive thirst and no excessive hunger.     Lab Results    Component Value Date    HGBA1C 5.6 06/01/2022     (H) 07/20/2022           Review of Systems   Constitutional:  Negative for chills, diaphoresis and fever.   HENT:  Negative for congestion, ear pain and sinus pressure.    Respiratory:  Negative for cough and shortness of breath.    Cardiovascular:  Negative for chest pain and palpitations.   Gastrointestinal:  Negative for abdominal pain, constipation, diarrhea, nausea and vomiting.   Musculoskeletal:  Positive for arthralgias. Negative for myalgias.   Skin:  Negative for rash and wound.   Neurological:  Negative for dizziness, tremors and headaches.   Psychiatric/Behavioral:  Negative for dysphoric mood. The patient is not nervous/anxious.          Objective:        Physical Exam  Constitutional:       General: She is not in acute distress.     Appearance: Normal appearance. She is well-developed. She is not toxic-appearing or diaphoretic.   HENT:      Head: Normocephalic and atraumatic.      Right Ear: Hearing normal.      Left Ear: Hearing normal.   Eyes:      General: Lids are normal.   Pulmonary:      Effort: No tachypnea or respiratory distress.   Musculoskeletal:      Cervical back: Normal range of motion.   Neurological:      Mental Status: She is alert and oriented to person, place, and time.      Comments: Alert and oriented   Psychiatric:         Attention and Perception: Attention normal.         Mood and Affect: Mood normal.         Behavior: Behavior is cooperative.             Assessment:       1. Essential hypertension    2. Bilateral primary osteoarthritis of knee    3. Prediabetes    4. Empty sella syndrome    5. Chronic kidney disease, stage 3b     6. Aortic atherosclerosis    7. Morbid obesity with BMI of 60.0-69.9, adult              Plan:     1. Essential hypertension  - Comprehensive Metabolic Panel; Future  - controlled, continue chlorthalidone,     2. Bilateral primary osteoarthritis of knee  - managed by Orthopedics @ outside  facility, takes Norco as needed  - unable to have knee replacement until BMI declines to lower level    3. Prediabetes  - reduce sugar intake, check labs  - Hemoglobin A1C; Future    4. Empty sella syndrome  - seen on previous MRI, consistent with pseudotumor cerebri, treated with acetazolamide, monitored by Neuro-ophthalmology @ [facility]    5. Chronic kidney disease, stage 3b   - Comprehensive Metabolic Panel; Future  - baseline Scr 0.9-1.1, monitor frequently    6. Aortic atherosclerosis  - seen on past imaging, control hypertension, continue pravastatin, continue to monitor    7. Morbid obesity with BMI of 60.0-69.9, adult  - Wegovy may be covered by insurance, will start in order to help her lose weight and possibly proceed with knee replacement surgery  -  semaglutide, weight loss, 0.25 mg/0.5 mL PnIj; Inject 0.25 mg into the skin once a week.  Dispense: 1 each; Refill: 1      RTC in June for annual exam or sooner if needed    __________________________    Faiza Kuhn MD, PharmD  [facility]  American Board of Obesity Medicine diplomate  Office [phone]

## 2023-03-14 NOTE — TELEPHONE ENCOUNTER
Called and spoke to pt regarding getting Annual exam/Labs schedule. Pt is schedule on 6/23/23 at 9 am with . Pt ask can she be added to wait list to get an appointment after 10 am. Pt Labs are schedule 6/21/23 Fasting labs 10 am.

## 2023-03-14 NOTE — TELEPHONE ENCOUNTER
Ordered annual labs. Please schedule in June. Schedule annual exam around or after 6/23/23.    Also please check on Wegovy PA. Is there a PA request form that can be completed?

## 2023-03-16 ENCOUNTER — LAB VISIT (OUTPATIENT)
Dept: LAB | Facility: HOSPITAL | Age: 68
End: 2023-03-16
Attending: INTERNAL MEDICINE
Payer: MEDICARE

## 2023-03-16 ENCOUNTER — PATIENT MESSAGE (OUTPATIENT)
Dept: INTERNAL MEDICINE | Facility: CLINIC | Age: 68
End: 2023-03-16
Payer: MEDICARE

## 2023-03-16 DIAGNOSIS — N18.32 CHRONIC KIDNEY DISEASE, STAGE 3B: ICD-10-CM

## 2023-03-16 DIAGNOSIS — R73.03 PREDIABETES: ICD-10-CM

## 2023-03-16 DIAGNOSIS — E66.01 MORBID OBESITY WITH BMI OF 60.0-69.9, ADULT: ICD-10-CM

## 2023-03-16 DIAGNOSIS — I10 ESSENTIAL HYPERTENSION: ICD-10-CM

## 2023-03-16 LAB
ALBUMIN SERPL BCP-MCNC: 3.4 G/DL (ref 3.5–5.2)
ALP SERPL-CCNC: 68 U/L (ref 55–135)
ALT SERPL W/O P-5'-P-CCNC: 18 U/L (ref 10–44)
ANION GAP SERPL CALC-SCNC: 6 MMOL/L (ref 8–16)
AST SERPL-CCNC: 19 U/L (ref 10–40)
BASOPHILS # BLD AUTO: 0.07 K/UL (ref 0–0.2)
BASOPHILS NFR BLD: 0.9 % (ref 0–1.9)
BILIRUB SERPL-MCNC: 0.4 MG/DL (ref 0.1–1)
BUN SERPL-MCNC: 16 MG/DL (ref 8–23)
CALCIUM SERPL-MCNC: 9.3 MG/DL (ref 8.7–10.5)
CHLORIDE SERPL-SCNC: 107 MMOL/L (ref 95–110)
CO2 SERPL-SCNC: 29 MMOL/L (ref 23–29)
CREAT SERPL-MCNC: 0.9 MG/DL (ref 0.5–1.4)
DIFFERENTIAL METHOD: ABNORMAL
EOSINOPHIL # BLD AUTO: 0.3 K/UL (ref 0–0.5)
EOSINOPHIL NFR BLD: 4 % (ref 0–8)
ERYTHROCYTE [DISTWIDTH] IN BLOOD BY AUTOMATED COUNT: 15.4 % (ref 11.5–14.5)
EST. GFR  (NO RACE VARIABLE): >60 ML/MIN/1.73 M^2
ESTIMATED AVG GLUCOSE: 126 MG/DL (ref 68–131)
GLUCOSE SERPL-MCNC: 97 MG/DL (ref 70–110)
HBA1C MFR BLD: 6 % (ref 4–5.6)
HCT VFR BLD AUTO: 47.4 % (ref 37–48.5)
HGB BLD-MCNC: 13.9 G/DL (ref 12–16)
IMM GRANULOCYTES # BLD AUTO: 0.04 K/UL (ref 0–0.04)
IMM GRANULOCYTES NFR BLD AUTO: 0.5 % (ref 0–0.5)
LYMPHOCYTES # BLD AUTO: 2.4 K/UL (ref 1–4.8)
LYMPHOCYTES NFR BLD: 28.7 % (ref 18–48)
MCH RBC QN AUTO: 24.6 PG (ref 27–31)
MCHC RBC AUTO-ENTMCNC: 29.3 G/DL (ref 32–36)
MCV RBC AUTO: 84 FL (ref 82–98)
MONOCYTES # BLD AUTO: 0.8 K/UL (ref 0.3–1)
MONOCYTES NFR BLD: 10.1 % (ref 4–15)
NEUTROPHILS # BLD AUTO: 4.6 K/UL (ref 1.8–7.7)
NEUTROPHILS NFR BLD: 55.8 % (ref 38–73)
NRBC BLD-RTO: 0 /100 WBC
PLATELET # BLD AUTO: 155 K/UL (ref 150–450)
PMV BLD AUTO: 11 FL (ref 9.2–12.9)
POTASSIUM SERPL-SCNC: 4.2 MMOL/L (ref 3.5–5.1)
PROT SERPL-MCNC: 6.7 G/DL (ref 6–8.4)
RBC # BLD AUTO: 5.66 M/UL (ref 4–5.4)
SODIUM SERPL-SCNC: 142 MMOL/L (ref 136–145)
WBC # BLD AUTO: 8.22 K/UL (ref 3.9–12.7)

## 2023-03-16 PROCEDURE — 36415 COLL VENOUS BLD VENIPUNCTURE: CPT | Mod: PO | Performed by: INTERNAL MEDICINE

## 2023-03-16 PROCEDURE — 85025 COMPLETE CBC W/AUTO DIFF WBC: CPT | Performed by: INTERNAL MEDICINE

## 2023-03-16 PROCEDURE — 80053 COMPREHEN METABOLIC PANEL: CPT | Performed by: INTERNAL MEDICINE

## 2023-03-16 PROCEDURE — 83036 HEMOGLOBIN GLYCOSYLATED A1C: CPT | Performed by: INTERNAL MEDICINE

## 2023-03-16 NOTE — TELEPHONE ENCOUNTER
WEGOVY    Closed  3/14/2023  9:35 AM  Case ID: [id number] Close reason: Prior Authorization not required for patient/medication   Note from payer: Drug is covered by current benefit plan. No further PA activity needed   Payer:  [employer] Patient's Payer    [phone]

## 2023-03-16 NOTE — TELEPHONE ENCOUNTER
Called [facility] to see what was going on with the Wegovy. The pharmacist stated it would cost the pt $1,500.00. it is not covered on pt plan and it is not on the formulary. The pt insurance only took off $20. Please advise what next.     Usually the weight loss injections are only covered if the pt has diabetes. It will not cover for pre-diabetes, Obese BMI.

## 2023-03-16 NOTE — TELEPHONE ENCOUNTER
PLease call [facility] and verify that insurance covered the medication. Please also find out the cost and let me know.

## 2023-03-16 NOTE — TELEPHONE ENCOUNTER
Spoke to pt and she states she is under her  insurance as secondary coverage. She said her deductible is 0  and her rx is usually around $40

## 2023-03-17 DIAGNOSIS — G47.00 INSOMNIA, UNSPECIFIED TYPE: ICD-10-CM

## 2023-03-17 RX ORDER — TRAZODONE HYDROCHLORIDE 50 MG/1
TABLET ORAL
Qty: 90 TABLET | Refills: 3 | Status: SHIPPED | OUTPATIENT
Start: 2023-03-17 | End: 2024-03-16

## 2023-03-17 NOTE — TELEPHONE ENCOUNTER
Refill Decision Note   Joy Singer  is requesting a refill authorization.  Brief Assessment and Rationale for Refill:  Approve     Medication Therapy Plan:       Medication Reconciliation Completed: No   Comments:     No Care Gaps recommended.     Note composed:11:18 AM 03/17/2023

## 2023-03-17 NOTE — TELEPHONE ENCOUNTER
No new care gaps identified.  [facility] Embedded Care Gaps. Reference number: [id number]. 3/17/2023   10:07:13 AM MARIZA

## 2023-03-20 NOTE — TELEPHONE ENCOUNTER
It appears that her 's [employer] Benefit says that she will pay 20% of the cost of a preferred or non-preferred brand medication. The maximum cost should be $40 so not sure why that is not the charge.    There is a $30-50 copay when mail order ([facility]) is used.     Please ask her to call [employer] [phone] during business hours and ask them the cost for coverage. I am sending it to [employer].    PA not needed.

## 2023-03-24 ENCOUNTER — TELEPHONE (OUTPATIENT)
Dept: INTERNAL MEDICINE | Facility: CLINIC | Age: 68
End: 2023-03-24
Payer: MEDICARE

## 2023-03-24 NOTE — TELEPHONE ENCOUNTER
[facility] is faxing to clarify wegovy rx sent 3/20/23.    Usually rx is 90 day supply and I guess the rx sent was not.    Please resend rx for 90 days.    semaglutide, weight loss, 0.25 mg/0.5 mL PnIj 3 each 0 3/20/2023  No   Sig - Route: Inject 0.25 mg into the skin once a week. - Subcutaneous   Sent to pharmacy as: semaglutide, weight loss, 0.25 mg/0.5 mL PnIj   Class: Normal   Order: [id number]   Date/Time Signed: 3/20/2023 16:04       E-Prescribing Status: Receipt confirmed by pharmacy (3/20/2023  4:04 PM CDT)   Prior authorization: Closed - Prior Authorization not required for patient/medication         Thanks mimi

## 2023-03-29 DIAGNOSIS — E87.6 HYPOKALEMIA: ICD-10-CM

## 2023-03-30 ENCOUNTER — LAB VISIT (OUTPATIENT)
Dept: LAB | Facility: HOSPITAL | Age: 68
End: 2023-03-30
Attending: INTERNAL MEDICINE
Payer: MEDICARE

## 2023-03-30 DIAGNOSIS — I10 ESSENTIAL HYPERTENSION: ICD-10-CM

## 2023-03-30 DIAGNOSIS — Z00.00 ANNUAL PHYSICAL EXAM: ICD-10-CM

## 2023-03-30 LAB
BACTERIA #/AREA URNS AUTO: ABNORMAL /HPF
BILIRUB UR QL STRIP: NEGATIVE
CAOX CRY UR QL COMP ASSIST: ABNORMAL
CLARITY UR REFRACT.AUTO: CLEAR
COLOR UR AUTO: YELLOW
GLUCOSE UR QL STRIP: NEGATIVE
HGB UR QL STRIP: ABNORMAL
KETONES UR QL STRIP: NEGATIVE
LEUKOCYTE ESTERASE UR QL STRIP: NEGATIVE
MICROSCOPIC COMMENT: ABNORMAL
NITRITE UR QL STRIP: NEGATIVE
PH UR STRIP: 5 [PH] (ref 5–8)
PROT UR QL STRIP: NEGATIVE
RBC #/AREA URNS AUTO: 44 /HPF (ref 0–4)
SP GR UR STRIP: 1.02 (ref 1–1.03)
SQUAMOUS #/AREA URNS AUTO: 3 /HPF
URATE CRY UR QL COMP ASSIST: ABNORMAL
URN SPEC COLLECT METH UR: ABNORMAL
WBC #/AREA URNS AUTO: 3 /HPF (ref 0–5)

## 2023-03-30 PROCEDURE — 81001 URINALYSIS AUTO W/SCOPE: CPT | Performed by: INTERNAL MEDICINE

## 2023-03-31 ENCOUNTER — PATIENT MESSAGE (OUTPATIENT)
Dept: INTERNAL MEDICINE | Facility: CLINIC | Age: 68
End: 2023-03-31
Payer: MEDICARE

## 2023-03-31 ENCOUNTER — PATIENT MESSAGE (OUTPATIENT)
Dept: UROLOGY | Facility: CLINIC | Age: 68
End: 2023-03-31
Payer: MEDICARE

## 2023-03-31 DIAGNOSIS — R73.03 PREDIABETES: Primary | ICD-10-CM

## 2023-03-31 RX ORDER — METFORMIN HYDROCHLORIDE 500 MG/1
500 TABLET ORAL 2 TIMES DAILY WITH MEALS
Qty: 60 TABLET | Refills: 0 | Status: SHIPPED | OUTPATIENT
Start: 2023-03-31 | End: 2023-09-05

## 2023-03-31 NOTE — TELEPHONE ENCOUNTER
Spoke to insurance and [facility] and [employer] likely not covered.    Will start metformin 500mg bid for pre-diabetes.     See my message

## 2023-03-31 NOTE — TELEPHONE ENCOUNTER
They probably do not have the urine in the lab anymore to run a culture with.   She has a standing UC order in place that can be used.   Once she drops it off I will send her antibiotics if she feels like she needs them.   Thanks  RHONDA

## 2023-03-31 NOTE — TELEPHONE ENCOUNTER
Please advise pt wants result note on Labs and pt weight loss medication after insurance is 1,000.

## 2023-04-04 RX ORDER — POTASSIUM CHLORIDE 750 MG/1
TABLET, EXTENDED RELEASE ORAL
Qty: 180 TABLET | Refills: 3 | Status: SHIPPED | OUTPATIENT
Start: 2023-04-04

## 2023-04-21 ENCOUNTER — TELEPHONE (OUTPATIENT)
Dept: FAMILY MEDICINE | Facility: CLINIC | Age: 68
End: 2023-04-21
Payer: MEDICARE

## 2023-07-17 ENCOUNTER — TELEPHONE (OUTPATIENT)
Dept: HEMATOLOGY/ONCOLOGY | Facility: CLINIC | Age: 68
End: 2023-07-17
Payer: MEDICARE

## 2023-07-17 NOTE — TELEPHONE ENCOUNTER
----- Message from Joyce Shell sent at 7/17/2023  1:43 PM CDT -----  Regarding: orders needed  Contact: pt [phone]  Pt is calling to speak with someone in provider office in regards to having provider put in breast US for pt pt stated she has this completed every year and her last appt was 7/19/22 pt  is asking for a return call please call pt at  [phone]

## 2023-07-17 NOTE — TELEPHONE ENCOUNTER
----- Message from George Goode NP sent at 7/17/2023  2:19 PM CDT -----  Regarding: RE: orders needed  Contact: pt [phone]  She is due for a follow up visit. I can not order unless follows up in high risk clinic.   thanks  ----- Message -----  From: Mouna Arenas RN  Sent: 7/17/2023   2:16 PM CDT  To: George Goode NP  Subject: FW: orders needed                                Need new order please    ----- Message -----  From: Joyce Shell  Sent: 7/17/2023   1:46 PM CDT  To: Russel Gonzalez  Subject: orders needed                                    Pt is calling to speak with someone in provider office in regards to having provider put in breast US for pt pt stated she has this completed every year and her last appt was 7/19/22 pt  is asking for a return call please call pt at  [phone]

## 2023-07-18 NOTE — TELEPHONE ENCOUNTER
----- Message from Mouna Arenas RN sent at 7/18/2023 11:58 AM CDT -----  Regarding: call back  Contact: caprice  Pt tried returning call to you    ----- Message -----  From: Josh Weinberg  Sent: 7/18/2023  11:51 AM CDT  To: Russel Gonzalez    Type:  Patient Returning Call    Who Called:leatha  Who Left Message for Patient:Dominique Gonzalez  Does the patient know what this is regarding?:appt   Would the patient rather a call back or a response via [facility]? Call back  Best Call Back Number:[phone]  Additional Information: pt stated that she received a missed call in regards to pt appt.

## 2023-07-27 ENCOUNTER — LAB VISIT (OUTPATIENT)
Dept: LAB | Facility: HOSPITAL | Age: 68
End: 2023-07-27
Attending: INTERNAL MEDICINE
Payer: MEDICARE

## 2023-07-27 DIAGNOSIS — Z00.00 ANNUAL PHYSICAL EXAM: ICD-10-CM

## 2023-07-27 DIAGNOSIS — I10 ESSENTIAL HYPERTENSION: ICD-10-CM

## 2023-07-27 LAB
ALBUMIN SERPL BCP-MCNC: 3.5 G/DL (ref 3.5–5.2)
ALP SERPL-CCNC: 71 U/L (ref 55–135)
ALT SERPL W/O P-5'-P-CCNC: 23 U/L (ref 10–44)
ANION GAP SERPL CALC-SCNC: 10 MMOL/L (ref 8–16)
AST SERPL-CCNC: 20 U/L (ref 10–40)
BASOPHILS # BLD AUTO: 0.08 K/UL (ref 0–0.2)
BASOPHILS NFR BLD: 1 % (ref 0–1.9)
BILIRUB SERPL-MCNC: 0.4 MG/DL (ref 0.1–1)
BUN SERPL-MCNC: 14 MG/DL (ref 8–23)
CALCIUM SERPL-MCNC: 10 MG/DL (ref 8.7–10.5)
CHLORIDE SERPL-SCNC: 109 MMOL/L (ref 95–110)
CHOLEST SERPL-MCNC: 179 MG/DL (ref 120–199)
CHOLEST/HDLC SERPL: 4.1 {RATIO} (ref 2–5)
CO2 SERPL-SCNC: 22 MMOL/L (ref 23–29)
CREAT SERPL-MCNC: 0.9 MG/DL (ref 0.5–1.4)
DIFFERENTIAL METHOD: ABNORMAL
EOSINOPHIL # BLD AUTO: 0.3 K/UL (ref 0–0.5)
EOSINOPHIL NFR BLD: 3.5 % (ref 0–8)
ERYTHROCYTE [DISTWIDTH] IN BLOOD BY AUTOMATED COUNT: 15.4 % (ref 11.5–14.5)
EST. GFR  (NO RACE VARIABLE): >60 ML/MIN/1.73 M^2
GLUCOSE SERPL-MCNC: 100 MG/DL (ref 70–110)
HCT VFR BLD AUTO: 48 % (ref 37–48.5)
HDLC SERPL-MCNC: 44 MG/DL (ref 40–75)
HDLC SERPL: 24.6 % (ref 20–50)
HGB BLD-MCNC: 14.5 G/DL (ref 12–16)
IMM GRANULOCYTES # BLD AUTO: 0.03 K/UL (ref 0–0.04)
IMM GRANULOCYTES NFR BLD AUTO: 0.4 % (ref 0–0.5)
LDLC SERPL CALC-MCNC: 109 MG/DL (ref 63–159)
LYMPHOCYTES # BLD AUTO: 2.6 K/UL (ref 1–4.8)
LYMPHOCYTES NFR BLD: 33.3 % (ref 18–48)
MCH RBC QN AUTO: 24.3 PG (ref 27–31)
MCHC RBC AUTO-ENTMCNC: 30.2 G/DL (ref 32–36)
MCV RBC AUTO: 81 FL (ref 82–98)
MONOCYTES # BLD AUTO: 0.7 K/UL (ref 0.3–1)
MONOCYTES NFR BLD: 8.6 % (ref 4–15)
NEUTROPHILS # BLD AUTO: 4.2 K/UL (ref 1.8–7.7)
NEUTROPHILS NFR BLD: 53.2 % (ref 38–73)
NONHDLC SERPL-MCNC: 135 MG/DL
NRBC BLD-RTO: 0 /100 WBC
PLATELET # BLD AUTO: 160 K/UL (ref 150–450)
PMV BLD AUTO: 11.4 FL (ref 9.2–12.9)
POTASSIUM SERPL-SCNC: 4 MMOL/L (ref 3.5–5.1)
PROT SERPL-MCNC: 7.1 G/DL (ref 6–8.4)
RBC # BLD AUTO: 5.96 M/UL (ref 4–5.4)
SODIUM SERPL-SCNC: 141 MMOL/L (ref 136–145)
TRIGL SERPL-MCNC: 130 MG/DL (ref 30–150)
TSH SERPL DL<=0.005 MIU/L-ACNC: 2.16 UIU/ML (ref 0.4–4)
WBC # BLD AUTO: 7.9 K/UL (ref 3.9–12.7)

## 2023-07-27 PROCEDURE — 84443 ASSAY THYROID STIM HORMONE: CPT | Performed by: INTERNAL MEDICINE

## 2023-07-27 PROCEDURE — 80061 LIPID PANEL: CPT | Performed by: INTERNAL MEDICINE

## 2023-07-27 PROCEDURE — 80053 COMPREHEN METABOLIC PANEL: CPT | Performed by: INTERNAL MEDICINE

## 2023-07-27 PROCEDURE — 36415 COLL VENOUS BLD VENIPUNCTURE: CPT | Mod: PO | Performed by: INTERNAL MEDICINE

## 2023-07-27 PROCEDURE — 85025 COMPLETE CBC W/AUTO DIFF WBC: CPT | Performed by: INTERNAL MEDICINE

## 2023-07-31 ENCOUNTER — TELEPHONE (OUTPATIENT)
Dept: HEMATOLOGY/ONCOLOGY | Facility: CLINIC | Age: 68
End: 2023-07-31
Payer: MEDICARE

## 2023-07-31 NOTE — TELEPHONE ENCOUNTER
----- Message from Gerda Byers sent at 7/31/2023  4:34 PM CDT -----  Contact: [phone]  Pt has been waiting on a call back from the office in regards to an appt and her mammogram. Please call with an update, per pt.                 Thank you

## 2023-08-02 DIAGNOSIS — Z91.89 AT HIGH RISK FOR BREAST CANCER: Primary | ICD-10-CM

## 2023-08-02 DIAGNOSIS — Z80.3 FAMILY HISTORY OF BREAST CANCER: ICD-10-CM

## 2023-08-17 ENCOUNTER — HOSPITAL ENCOUNTER (OUTPATIENT)
Dept: RADIOLOGY | Facility: HOSPITAL | Age: 68
Discharge: HOME | End: 2023-08-17
Attending: NURSE PRACTITIONER
Payer: MEDICARE

## 2023-08-17 ENCOUNTER — OFFICE VISIT (OUTPATIENT)
Dept: HEMATOLOGY/ONCOLOGY | Facility: CLINIC | Age: 68
End: 2023-08-17
Payer: MEDICARE

## 2023-08-17 VITALS
HEART RATE: 88 BPM | BODY MASS INDEX: 47.09 KG/M2 | TEMPERATURE: 97 F | DIASTOLIC BLOOD PRESSURE: 76 MMHG | OXYGEN SATURATION: 94 % | SYSTOLIC BLOOD PRESSURE: 152 MMHG | WEIGHT: 293 LBS | RESPIRATION RATE: 18 BRPM | HEIGHT: 66 IN

## 2023-08-17 DIAGNOSIS — Z80.3 FAMILY HISTORY OF BREAST CANCER: ICD-10-CM

## 2023-08-17 DIAGNOSIS — Z12.31 ENCOUNTER FOR SCREENING MAMMOGRAM FOR BREAST CANCER: ICD-10-CM

## 2023-08-17 DIAGNOSIS — E66.01 MORBID OBESITY WITH BODY MASS INDEX (BMI) OF 60.0 TO 69.9 IN ADULT: ICD-10-CM

## 2023-08-17 DIAGNOSIS — Z91.89 AT HIGH RISK FOR BREAST CANCER: Primary | ICD-10-CM

## 2023-08-17 DIAGNOSIS — Z91.89 AT HIGH RISK FOR BREAST CANCER: ICD-10-CM

## 2023-08-17 PROCEDURE — 76641 ULTRASOUND BREAST COMPLETE: CPT | Mod: 26,RT,, | Performed by: RADIOLOGY

## 2023-08-17 PROCEDURE — 99214 OFFICE O/P EST MOD 30 MIN: CPT | Mod: S$PBB,,, | Performed by: NURSE PRACTITIONER

## 2023-08-17 PROCEDURE — 76641 ULTRASOUND BREAST COMPLETE: CPT | Mod: TC,50

## 2023-08-17 PROCEDURE — 99214 PR OFFICE/OUTPT VISIT, EST, LEVL IV, 30-39 MIN: ICD-10-PCS | Mod: S$PBB,,, | Performed by: NURSE PRACTITIONER

## 2023-08-17 PROCEDURE — 99213 OFFICE O/P EST LOW 20 MIN: CPT | Mod: PBBFAC | Performed by: NURSE PRACTITIONER

## 2023-08-17 PROCEDURE — 99999 PR PBB SHADOW E&M-EST. PATIENT-LVL III: ICD-10-PCS | Mod: PBBFAC,,, | Performed by: NURSE PRACTITIONER

## 2023-08-17 PROCEDURE — 76641 ULTRASOUND BREAST COMPLETE: CPT | Mod: 26,LT,, | Performed by: RADIOLOGY

## 2023-08-17 PROCEDURE — 76641 PR US BREAST COMPLETE UNILAT: ICD-10-PCS | Mod: 26,LT,, | Performed by: RADIOLOGY

## 2023-08-17 PROCEDURE — 99999 PR PBB SHADOW E&M-EST. PATIENT-LVL III: CPT | Mod: PBBFAC,,, | Performed by: NURSE PRACTITIONER

## 2023-08-17 NOTE — PROGRESS NOTES
Chief Complaint   Patient presents with    At high risk for breast cancer       HPI:   Joy Singer presents for follow up  of increased risk of breast cancer.   She was previously seen by Dr. Wall. I last saw her 1/2022.         Today, Feels good.   Limited mobility due to weight.   Was on metformin and insurance now approved ozempic but hadn't started. Doesn't feel it will work bc not working for her sister.   No breast concerns today  No new pain issues other than her knees. In WC but usually ambulates with cane  No other issues or complaints  Tearful when talking about 's experience with weight loss clinic here.       1/17/2023 MMG:   Impression:   No mammographic evidence of malignancy.     BI-RADS Category 1: Negative     Recommendation:  Routine screening mammogram in 1 year is recommended.     Your estimated lifetime risk of breast cancer (to age 85) based on Tyrer-Cuzick risk assessment model is 10.92 %.  According to the American Cancer Society, patients with a lifetime breast cancer risk of 20% or higher might benefit from supplemental screening tests    High Risk Breast cancer specific history:  - Height:  5'6  - Weight:  385 lbs > 421lbs  - Breast density per BI-RADS:  b - Scattered fibroglandular density  - Age at menarche:  11 yo  - Number of pregnancies: 2; age of first live birth: 26 yo  - History of breast feeding: Yes - 3 months  - Age at menopause, if applicable:  surgical at 50 yo  -Uterus and ovaries intact: S/p BSO at 50 y/o  - HRT: No  - Genetic testing: Yes - [employer] Common Hereditary Cancers Panel, heterozygous VUS identified in BRCA2, in 2019.  Opts out of further Genetic counseling.   - Personal history of cancer: No  - Previous chest radiation exposure between ages 10-30 years old: No  - Personal history of breast biopsy: Yes - Hx of mammotome biopsies with Dr. Seema Aguilar, several among both breasts, all benign per patient, pt states last was well over 2 years ago, pt  has since transferred her breast-related care to Dr. Wall, pt does not recall ever being told she has LCIS or atypia/hyperplasia of the breast(s)  - Ashkenazi Druze Inheritance: No  - Family cancer history:   Family History   Problem Relation Age of Onset    Breast cancer Maternal Aunt           50s    Breast cancer Maternal Cousin 68         daughter of aunt dx'd in 50s    Breast cancer Maternal Cousin 64         mat 1st cousin, mother unaffected, myRisk with [employer] 2017 was negative (Antoinette)    Cancer Paternal Grandmother           brain cancer    Breast cancer Maternal Aunt 68    Breast cancer Maternal Cousin           1st cousin, daughter of unaffected aunt    Breast cancer Maternal Cousin           mother's brother's daughter    Ovarian cancer Neg Hx        Social History:  Tobacco use:  denies  Alcohol use:  Very seldom  Swims, hasn't exercised in a while, plans to resume           Past Medical   Past Medical History:   Diagnosis Date    Breast cyst     Carotid artery occlusion     Chronic back pain     Chronic bilateral low back pain without sciatica 11/08/2016    Colon polyps 2016    COVID-19 09/27/2022    Fibrocystic breast     HA (headache)     Hyperlipidemia     Morbid obesity with BMI of 60.0-69.9, adult     Obstructive sleep apnea (adult) (pediatric)     Pseudotumor cerebri      Patient Active Problem List   Diagnosis    Essential hypertension    LIZETH on CPAP    Idiopathic stabbing headache    Breast mass in female    Carpal tunnel syndrome    Degeneration of lumbar intervertebral disc    Empty sella syndrome    Disorder of sacroiliac joint    Migraine without aura, not refractory    Tibialis tendinitis    Leg swelling    Primary osteoarthritis of right knee    Pseudotumor cerebri    Mild anxiety    Bilateral foot pain    Elevated hematocrit    Prediabetes    Morbid obesity with BMI of 60.0-69.9, adult    Erythrocytosis    Pure hypercholesterolemia    Gross hematuria    Chronic kidney disease, stage  3b     High-tone pelvic floor dysfunction    Atrophic vaginitis    Mixed urge and stress incontinence    Aortic atherosclerosis    Hepatomegaly     Social History   Social History     Tobacco Use    Smoking status: Never    Smokeless tobacco: Never   Substance Use Topics    Alcohol use: Yes     Comment: social once or twice a year    Drug use: No     Family History  Family History   Problem Relation Age of Onset    Emphysema Mother     Lung cancer Mother     Hypertension Father     Heart failure Father     Diabetes Father     Emphysema Father     Heart failure Sister     Valvular heart disease Sister     Breast cancer Maternal Aunt         50s    Breast cancer Maternal Cousin 68        daughter of aunt dx'd in 50s    Breast cancer Maternal Cousin 64        mat 1st cousin, mother unaffected, Gianni with [employer] 2017 was negative    Cancer Paternal Grandmother         brain cancer    Breast cancer Maternal Aunt 68    Lung cancer Maternal Aunt     Lung cancer Maternal Uncle     Lung cancer Maternal Uncle     Lung cancer Maternal Uncle     Lung cancer Maternal Uncle         in addition to mesothelioma    Breast cancer Maternal Cousin         1st cousin, daughter of unaffected aunt    Breast cancer Maternal Cousin         mother's brother's daughter    Ovarian cancer Neg Hx      Medications    Current Outpatient Medications:     acetaZOLAMIDE (DIAMOX) 250 MG tablet, Take 250 mg by mouth 2 (two) times daily. , Disp: , Rfl:     ascorbic acid, vitamin C, 500 mg TbSR, Take by mouth once daily., Disp: , Rfl:     aspirin (ECOTRIN) 81 MG EC tablet, Take 81 mg by mouth once daily., Disp: , Rfl:     aspirin 325 MG tablet, Take 1 tablet by mouth every 6 to 8 hours as needed., Disp: , Rfl:     calcium carbonate-vitamin D3 600 mg(1,500mg) -500 unit Cap, Calcium 600 with Vitamin D3 600 mg (1,500 mg)-500 unit capsule  Take 1 capsule twice a day by oral route., Disp: , Rfl:     carisoprodol (SOMA) 350 MG tablet, Take 350 mg by mouth  continuous prn., Disp: , Rfl:     chlorthalidone (HYGROTEN) 25 MG Tab, TAKE ONE-HALF (1/2) TABLET DAILY, Disp: 45 tablet, Rfl: 3    clotrimazole-betamethasone 1-0.05% (LOTRISONE) cream, Apply topically 2 (two) times daily. To rash on buttocks, Disp: 15 g, Rfl: 1    conjugated estrogens (PREMARIN) vaginal cream, 1 g with applicator or dime-sized amt with finger in vagina nightly x 2 weeks, then twice a week thereafter, Disp: 45 g, Rfl: 12    cranberry fruit extract (CRANBERRY EXTRACT ORAL), Take by mouth., Disp: , Rfl:     diphenhydrAMINE (BENADRYL) 25 mg capsule, Take 25 mg by mouth every 6 (six) hours as needed for Itching., Disp: , Rfl:     docusate sodium (COLACE) 100 MG capsule, Colace 100 mg capsule  Take 2 capsule twice a day by oral route., Disp: , Rfl:     folic acid (FOLVITE) 400 MCG tablet, Take 400 mcg by mouth once daily., Disp: , Rfl:     hydrocodone-acetaminophen 7.5-325mg (NORCO) 7.5-325 mg per tablet, TK 1 T PO Q 6 TO 8 H PRN, Disp: , Rfl: 0    hydrocortisone (ANUSOL-HC) 2.5 % rectal cream, Place rectally 2 (two) times daily., Disp: 28 g, Rfl: 1    ketoconazole (NIZORAL) 2 % shampoo, , Disp: , Rfl:     ketoconazole 2 % Foam, Extina 2 % topical foam as needed, Disp: , Rfl:     Lactobac no.51/Bifidobact no.4 (UP4 PROBIOTICS ULTRA ORAL), Take by mouth., Disp: , Rfl:     metFORMIN (GLUCOPHAGE) 500 MG tablet, Take 1 tablet (500 mg total) by mouth 2 (two) times daily with meals., Disp: 60 tablet, Rfl: 0    multivitamin-iron-folic acid (CENTRUM) Tab, Take 1 tablet by mouth once daily., Disp: , Rfl:     polyethylene glycol (GLYCOLAX) 17 gram/dose powder, polyethylene glycol 3350 17 gram/dose oral powder as needed, Disp: , Rfl:     potassium chloride SA (K-DUR,KLOR-CON M) 10 MEQ tablet, TAKE 2 TABLETS ONCE DAILY, Disp: 180 tablet, Rfl: 3    pravastatin (PRAVACHOL) 40 MG tablet, TAKE 1 TABLET DAILY, Disp: 90 tablet, Rfl: 3    semaglutide, weight loss, 0.25 mg/0.5 mL PnIj, Inject 0.25 mg into the skin once a  "week., Disp: 3 each, Rfl: 0    traZODone (DESYREL) 50 MG tablet, TAKE 1 TABLET(50 MG) BY MOUTH EVERY EVENING, Disp: 90 tablet, Rfl: 3    UROGESIC-BLUE 81.6-40.8-0.12 mg Tab, Take 1 tablet by mouth every 6 (six) hours as needed (bladder pain)., Disp: 30 tablet, Rfl: 5    vitamin E 1000 UNIT capsule, Take 1,000 Units by mouth once daily., Disp: , Rfl:     mirabegron (MYRBETRIQ) 50 mg Tb24, Take 1 tablet (50 mg total) by mouth once daily., Disp: 30 tablet, Rfl: 11  Allergies  Review of patient's allergies indicates:   Allergen Reactions    Insect venom Edema, Itching and Swelling    Adhesive tape-silicones Hives    Percocet [oxycodone-acetaminophen] Itching    Unable to assess Rash     Insect Bites    Venom-honey bee Rash       Review of Systems       See above   All other systems reviewed and are negative.    Objective:      Vitals:   Vitals:    08/17/23 1331   BP: (!) 152/76   Pulse: 88   Resp: 18   Temp: 97.1 °F (36.2 °C)   TempSrc: Oral   SpO2: (!) 94%   Weight: (!) 191.1 kg (421 lb 4.8 oz)   Height: 5' 6" (1.676 m)     BMI: Body mass index is 68 kg/m².   Body surface area is 2.98 meters squared.    Physical Exam  Vitals signs reviewed.   Constitutional:  Normal appearance. NAD. Obese.   HENT: Normocephalic.   Eyes: Pupils are equal, round, and reactive to light.   Cardiovascular: Normal rate.   Pulmonary: Pulmonary effort is normal.   Musculoskeletal: Normal range of motion.   Lymphadenopathy: No cervical adenopathy. No axillary adenopathy.   Skin: Skin is warm and dry.   Neurological:  She is alert and oriented to person, place, and time.   Psychiatric: Mood normal.     Breast Exam: Bilateral breast normal. No masses, no tenderness, no skin or nipple abnormalities.  No lymphadenopathy.  Exam done while sitting in chair as unable to to get on exam table.       Laboratory Data: reviewed most recent       Imaging: reviewed most recent          Assessment:     1. At high risk for breast cancer    2. Family history of " breast cancer    3. Morbid obesity with body mass index (BMI) of 60.0 to 69.9 in adult    4. Encounter for screening mammogram for breast cancer              Plan:       Patient elects to proceed with alternating annual mammogram and annual breast US along with semiannual CBEs.   She wishes to continue f/u with me yearly.   Lifestyle modifications as previously discussed  We discussed her starting ozempic (has supply from pcp). Portion control. Encouraged weekly weighing.   Encouraged breast awareness, including monthly breast self-exams.       Reassurance given    Breast US today as scheduled.   MMG due 1/2024    RTC 1 year    Route Chart for Scheduling    Med Onc Chart Routing      Follow up with physician    Follow up with [facility] 1 year. with breast Us immediatly after (request Dr. Winn)   Infusion scheduling note    Injection scheduling note    Labs    Imaging   MMG due 1/2024   Pharmacy appointment    Other referrals                 Questions were encouraged and answered to patient's satisfaction, and patient verbalized understanding of information and agreement with the plan. Advised patient to RTC with any interval changes or concerns.      Patient is in agreement with the proposed treatment plan. All questions were answered to the patient's satisfaction. Pt knows to call clinic for any new or worsening symptoms and if anything is needed before the next clinic visit.    George Goode, MSN, APRN, FNP-C  Nurse Practitioner to Dr. Sana Quintero  [facility]  [facility]  Hematology & Medical Oncology  [address] [place] [id number]  ph. [phone] ext [phone]  Fax. [phone]              30 minutes of total time spent on the encounter, which includes face to face time and non-face to face time preparing to see the patient (eg, review of tests), Obtaining and/or reviewing separately obtained history, Documenting clinical information in the electronic or  other health record, Independently interpreting results (not separately reported) and communicating results to the patient/family/caregiver, or Care coordination (not separately reported).

## 2023-09-05 ENCOUNTER — PATIENT MESSAGE (OUTPATIENT)
Dept: INTERNAL MEDICINE | Facility: CLINIC | Age: 68
End: 2023-09-05
Payer: MEDICARE

## 2023-09-05 ENCOUNTER — OFFICE VISIT (OUTPATIENT)
Dept: INTERNAL MEDICINE | Facility: CLINIC | Age: 68
End: 2023-09-05
Payer: MEDICARE

## 2023-09-05 VITALS
OXYGEN SATURATION: 96 % | HEIGHT: 66 IN | DIASTOLIC BLOOD PRESSURE: 90 MMHG | BODY MASS INDEX: 47.09 KG/M2 | HEART RATE: 84 BPM | TEMPERATURE: 98 F | SYSTOLIC BLOOD PRESSURE: 130 MMHG | WEIGHT: 293 LBS

## 2023-09-05 DIAGNOSIS — R73.03 PREDIABETES: ICD-10-CM

## 2023-09-05 DIAGNOSIS — N18.32 CHRONIC KIDNEY DISEASE, STAGE 3B: ICD-10-CM

## 2023-09-05 DIAGNOSIS — G93.2 PSEUDOTUMOR CEREBRI: ICD-10-CM

## 2023-09-05 DIAGNOSIS — M51.36 DEGENERATION OF LUMBAR INTERVERTEBRAL DISC: ICD-10-CM

## 2023-09-05 DIAGNOSIS — Z00.00 ANNUAL PHYSICAL EXAM: ICD-10-CM

## 2023-09-05 DIAGNOSIS — G47.33 OSA ON CPAP: ICD-10-CM

## 2023-09-05 DIAGNOSIS — M17.0 BILATERAL PRIMARY OSTEOARTHRITIS OF KNEE: ICD-10-CM

## 2023-09-05 DIAGNOSIS — I70.0 AORTIC ATHEROSCLEROSIS: ICD-10-CM

## 2023-09-05 DIAGNOSIS — E23.6 EMPTY SELLA SYNDROME: ICD-10-CM

## 2023-09-05 DIAGNOSIS — E66.01 MORBID OBESITY WITH BMI OF 60.0-69.9, ADULT: ICD-10-CM

## 2023-09-05 DIAGNOSIS — I10 ESSENTIAL HYPERTENSION: Primary | ICD-10-CM

## 2023-09-05 PROCEDURE — 99214 PR OFFICE/OUTPT VISIT, EST, LEVL IV, 30-39 MIN: ICD-10-PCS | Mod: S$PBB,,, | Performed by: INTERNAL MEDICINE

## 2023-09-05 PROCEDURE — 99999 PR PBB SHADOW E&M-EST. PATIENT-LVL V: ICD-10-PCS | Mod: PBBFAC,,, | Performed by: INTERNAL MEDICINE

## 2023-09-05 PROCEDURE — 99999 PR PBB SHADOW E&M-EST. PATIENT-LVL V: CPT | Mod: PBBFAC,,, | Performed by: INTERNAL MEDICINE

## 2023-09-05 PROCEDURE — 99215 OFFICE O/P EST HI 40 MIN: CPT | Mod: PBBFAC,PO | Performed by: INTERNAL MEDICINE

## 2023-09-05 PROCEDURE — 99214 OFFICE O/P EST MOD 30 MIN: CPT | Mod: S$PBB,,, | Performed by: INTERNAL MEDICINE

## 2023-09-05 NOTE — PROGRESS NOTES
Subjective:     PCP: Faiza Kuhn MD    Joy Singer is a 68 y.o. female who presents for an annual exam.    Chronic Medical Problems:      Hypertension: The patient has been taking medications as instructed, no medication side effects noted, no chest pain on exertion, no dyspnea on exertion. BP is mildly elevated today but she reports better readings at home.    BP Readings from Last 3 Encounters:   09/05/23 (!) 130/90   08/17/23 (!) 152/76   12/06/22 130/82       Pre-diabetes: Patient denies a family history of diabetes. States that her weight has fluctuated recently. she has gained 11 lbs over the past year. The patient has not made any changes to diet and was unable to increase physical activity due to pain although seated exercises were recommended. She denies excessive urination, no excessive thirst and no excessive hunger.     Lab Results   Component Value Date    HGBA1C 5.6 09/26/2023     (H) 09/26/2023     (H) 09/26/2023       Knee Pain: The patient presents with knee pain involving both knees. Onset was sudden, not related to any specific activity. Inciting event: none known. Current symptoms include: giving out and stiffness. Pain is aggravated by any weight bearing.Evaluation to date: sees an Orthopedist (Dr. Armendariz) and he prescribed Norco for pain control.    Medical History:   Past Medical History:   Diagnosis Date    BRCA1 negative     BRCA2 negative     Breast cyst     Carotid artery occlusion     Chronic back pain     Chronic bilateral low back pain without sciatica 11/08/2016    Colon polyps 2016    COVID-19 09/27/2022    Fibrocystic breast     HA (headache)     Hyperlipidemia     Morbid obesity with BMI of 60.0-69.9, adult     Obstructive sleep apnea (adult) (pediatric)     Pseudotumor cerebri        Family History: family history includes Breast cancer in her maternal aunt, maternal cousin, and maternal cousin; Breast cancer (age of onset: 64) in her maternal  cousin; Breast cancer (age of onset: 68) in her maternal aunt and maternal cousin; Cancer in her paternal grandmother; Diabetes in her father; Emphysema in her father and mother; Heart failure in her father and sister; Hypertension in her father; Lung cancer in her maternal aunt, maternal uncle, maternal uncle, maternal uncle, maternal uncle, and mother; Valvular heart disease in her sister.    Surgical History:   Past Surgical History:   Procedure Laterality Date    BLADDER SUSPENSION  2004    BREAST BIOPSY Bilateral 2003 and 2011    Core bx's, benign    BREAST CYST ASPIRATION      BREAST CYST EXCISION      CARPAL TUNNEL RELEASE  2000    COLONOSCOPY  2008 and 2011    eye growth removal  2010    FRACTURE SURGERY      HYSTERECTOMY  2004    OOPHORECTOMY      rotator cuff surgery  2011    left shoulder    toselectomy  1962        Social History:  reports that she has never smoked. She has never been exposed to tobacco smoke. She has never used smokeless tobacco. She reports current alcohol use. She reports that she does not use drugs.     Allergies:   Review of patient's allergies indicates:   Allergen Reactions    Insect venom Edema, Itching and Swelling    Adhesive tape-silicones Hives    Oxycodone-acetaminophen Itching and Other (See Comments)    Unable to assess Rash     Insect Bites    Venom-honey bee Rash and Other (See Comments)       Medications:   Current Outpatient Medications   Medication Sig    acetaZOLAMIDE (DIAMOX) 250 MG tablet Take 250 mg by mouth 2 (two) times daily.     ascorbic acid, vitamin C, 500 mg TbSR Take by mouth once daily.    aspirin (ECOTRIN) 81 MG EC tablet Take 81 mg by mouth once daily.    aspirin 325 MG tablet Take 1 tablet by mouth every 6 to 8 hours as needed.    calcium carbonate-vitamin D3 600 mg(1,500mg) -500 unit Cap Calcium 600 with Vitamin D3 600 mg (1,500 mg)-500 unit capsule   Take 1 capsule twice a day by oral route.    carisoprodol (SOMA) 350 MG tablet Take 350 mg by mouth  continuous prn.    chlorthalidone (HYGROTEN) 25 MG Tab TAKE ONE-HALF (1/2) TABLET DAILY    clotrimazole-betamethasone 1-0.05% (LOTRISONE) cream Apply topically 2 (two) times daily. To rash on buttocks    conjugated estrogens (PREMARIN) vaginal cream 1 g with applicator or dime-sized amt with finger in vagina nightly x 2 weeks, then twice a week thereafter    cranberry fruit extract (CRANBERRY EXTRACT ORAL) Take by mouth.    diphenhydrAMINE (BENADRYL) 25 mg capsule Take 25 mg by mouth every 6 (six) hours as needed for Itching.    docusate sodium (COLACE) 100 MG capsule Colace 100 mg capsule   Take 2 capsule twice a day by oral route.    folic acid (FOLVITE) 400 MCG tablet Take 400 mcg by mouth once daily.    hydrocodone-acetaminophen 7.5-325mg (NORCO) 7.5-325 mg per tablet TK 1 T PO Q 6 TO 8 H PRN    hydrocortisone (ANUSOL-HC) 2.5 % rectal cream Place rectally 2 (two) times daily.    ketoconazole (NIZORAL) 2 % shampoo     ketoconazole 2 % Foam Extina 2 % topical foam as needed    Lactobac no.51/Bifidobact no.4 (UP4 PROBIOTICS ULTRA ORAL) Take by mouth.    multivitamin-iron-folic acid (CENTRUM) Tab Take 1 tablet by mouth once daily.    polyethylene glycol (GLYCOLAX) 17 gram/dose powder polyethylene glycol 3350 17 gram/dose oral powder as needed    potassium chloride SA (K-DUR,KLOR-CON M) 10 MEQ tablet TAKE 2 TABLETS ONCE DAILY    pravastatin (PRAVACHOL) 40 MG tablet TAKE 1 TABLET DAILY    traZODone (DESYREL) 50 MG tablet TAKE 1 TABLET(50 MG) BY MOUTH EVERY EVENING    vitamin E 1000 UNIT capsule Take 1,000 Units by mouth once daily.     No current facility-administered medications for this visit.       Health Maintenance:   Health Maintenance Topics with due status: Not Due       Topic Last Completion Date    TETANUS VACCINE 11/08/2016    Colorectal Cancer Screening 02/16/2022    DEXA Scan 07/12/2022    Hemoglobin A1c (Prediabetes) 09/26/2023    Lipid Panel 09/26/2023    High Dose Statin 11/04/2023       Eye Exam:    Dr. West @ [facility], 2 months ago  Dental Exam: last year Dr. Leigh, appt   Mammogram: scheduled 2024  DEXA scan: 2022, normal  Colonoscopy:   2022, repeat in 5 years  Hepatitis C screenin2018, neg       Vaccinations:  Immunization History   Administered Date(s) Administered    COVID-19, MRNA, LN-S, PF (MODERNA FULL 0.5 ML DOSE) 02/10/2021, 03/10/2021, 2021, 2022    COVID-19, mRNA, LNP-S, bivalent booster, PF (Moderna Omicron)12 + YEARS 2023    Pneumococcal Conjugate - 20 Valent 2022    Td - PF (ADULT) 2016    Zoster Recombinant 2019, 2020, 2020     Influenza: due  Tetanus:   Shingrix: done  Prevnar-20:   Covid vaccine: needs booster    ADL's: independent  Memory: normal  Mental health: no signs of depression  Advance Directives: <no information>  Falls: no recent falls but fell and broke ribs in the past   Nutrition: normal  Home Safety: no issues      Body mass index is 67.75 kg/m².  Wt Readings from Last 3 Encounters:   23 (!) 190.4 kg (419 lb 12.1 oz)   23 (!) 191.1 kg (421 lb 4.8 oz)   23 (!) 185.1 kg (408 lb)   - insurance possibly covered semaglutide but now unavailable, will consider another GLP-1 Ag  - unable to exercise or ambulate much due to knee pain      Review of Systems   Constitutional:  Negative for chills, diaphoresis, fatigue and fever.   HENT:  Negative for congestion, dental problem, ear discharge, ear pain, postnasal drip, rhinorrhea, sinus pressure, sore throat and trouble swallowing.    Eyes:  Negative for redness and visual disturbance.   Respiratory:  Negative for cough, chest tightness and shortness of breath.    Cardiovascular:  Positive for leg swelling. Negative for chest pain and palpitations.   Gastrointestinal:  Negative for abdominal pain, blood in stool, constipation, diarrhea, nausea and vomiting.   Endocrine: Negative for polydipsia and polyuria.   Genitourinary:  Negative for decreased  urine volume, dysuria, frequency and hematuria.   Musculoskeletal:  Positive for arthralgias (knee pain, needs knee replacement but must lose weight first), back pain (lower back), gait problem (due to knee pain) and myalgias (cramping in calf muscles and quadriceps).   Skin:  Negative for rash and wound.   Neurological:  Positive for headaches (takes acetazolamide for pseudotumor but has infrequent HA that she relates to eye strain and she uses Excedrin as needed). Negative for dizziness, weakness and numbness.   Hematological:  Negative for adenopathy.   Psychiatric/Behavioral:  Negative for dysphoric mood and sleep disturbance. The patient is not nervous/anxious.           Objective:     Physical Exam  Vitals reviewed.   Constitutional:       General: She is awake. She is not in acute distress.     Appearance: Normal appearance. She is well-developed and well-groomed. She is not diaphoretic.   HENT:      Head: Normocephalic and atraumatic.      Right Ear: Hearing, tympanic membrane, ear canal and external ear normal. Tympanic membrane is not erythematous or bulging.      Left Ear: Hearing, tympanic membrane, ear canal and external ear normal. Tympanic membrane is not erythematous or bulging.      Nose: Nose normal. No congestion.      Mouth/Throat:      Mouth: Mucous membranes are moist.      Tongue: No lesions.      Pharynx: Oropharynx is clear. Uvula midline. No oropharyngeal exudate or posterior oropharyngeal erythema.   Eyes:      General: Lids are normal. Vision grossly intact. Gaze aligned appropriately. No scleral icterus.     Conjunctiva/sclera:      Right eye: Right conjunctiva is not injected.      Left eye: Left conjunctiva is not injected.      Pupils: Pupils are equal, round, and reactive to light.   Neck:      Thyroid: No thyroid mass or thyromegaly.   Cardiovascular:      Rate and Rhythm: Normal rate and regular rhythm.      Pulses: Normal pulses.      Heart sounds: Normal heart sounds. No murmur  heard.  Pulmonary:      Effort: Pulmonary effort is normal. No respiratory distress.      Breath sounds: Normal breath sounds. No decreased breath sounds or wheezing.   Abdominal:      General: Bowel sounds are normal. There is no distension.      Palpations: Abdomen is soft. Abdomen is not rigid.      Tenderness: There is no abdominal tenderness. There is no guarding or rebound.   Musculoskeletal:         General: Normal range of motion.      Cervical back: Normal range of motion and neck supple.      Right lower leg: No edema.      Left lower leg: No edema.   Lymphadenopathy:      Cervical: No cervical adenopathy.      Upper Body:      Right upper body: No supraclavicular adenopathy.      Left upper body: No supraclavicular adenopathy.   Skin:     General: Skin is warm and dry.      Coloration: Skin is not cyanotic.      Findings: No lesion or rash.      Nails: There is no clubbing.   Neurological:      General: No focal deficit present.      Mental Status: She is alert and oriented to person, place, and time.      Sensory: Sensation is intact.      Coordination: Coordination is intact.      Gait: Gait is intact.      Deep Tendon Reflexes: Reflexes are normal and symmetric.   Psychiatric:         Attention and Perception: Attention normal.         Mood and Affect: Mood normal.         Behavior: Behavior is cooperative.            Assessment:        1. Essential hypertension    2. Bilateral primary osteoarthritis of knee    3. Degeneration of lumbar intervertebral disc    4. Pseudotumor cerebri    5. Chronic kidney disease, stage 3b     6. Prediabetes    7. LIZETH on CPAP    8. Empty sella syndrome    9. Annual physical exam    10. Aortic atherosclerosis    11. Morbid obesity with BMI of 60.0-69.9, adult           Plan:     1. Essential hypertension  - Basic Metabolic Panel; Future  - continue current regimen, monitor BOP closely and will adjust medications as needed    2. Bilateral primary osteoarthritis of knee  -  remains in pain, takes Norco as needed provided from an outside provider (Dr. Armendariz)    3. Degeneration of lumbar intervertebral disc  - stable, continue Norco    4. Pseudotumor cerebri  - stable, f/u with Neuro-ophthalmology    5. Chronic kidney disease, stage 3b   - Basic Metabolic Panel; Future  - maintain adequate water intake, avoid nephrotoxins    6. Prediabetes  - CBC Auto Differential; Future  - Hemoglobin A1C; Future    7. LIZETH on CPAP  - will continue to monitor    8. Empty sella syndrome  - found incidentally in 2014, will monitor    9. Annual physical exam  - reviewed recent labs with patient    10. Aortic atherosclerosis  - seen on past imaging, continue antihypertensive medications and pravachol    11. Morbid obesity with BMI of 60.0-69.9, adult  - discussed a few ways to increase upper body strength  - reviewed treatment but best options would be GLP-1 Ag's, will determine if saxenda may be covered    RTC in 3 months for follow-up or sooner if needed (virtual ok)    __________________________    Faiza Kuhn MD, PharmD  [facility]  American Board of Obesity Medicine diplomate  Office [phone]

## 2023-09-26 ENCOUNTER — LAB VISIT (OUTPATIENT)
Dept: LAB | Facility: HOSPITAL | Age: 68
End: 2023-09-26
Payer: MEDICARE

## 2023-09-26 DIAGNOSIS — N18.32 CHRONIC KIDNEY DISEASE, STAGE 3B: ICD-10-CM

## 2023-09-26 DIAGNOSIS — E78.00 PURE HYPERCHOLESTEROLEMIA: ICD-10-CM

## 2023-09-26 DIAGNOSIS — I10 ESSENTIAL HYPERTENSION: ICD-10-CM

## 2023-09-26 DIAGNOSIS — R73.03 PREDIABETES: ICD-10-CM

## 2023-09-26 DIAGNOSIS — G93.2 PSEUDOTUMOR CEREBRI: ICD-10-CM

## 2023-09-26 DIAGNOSIS — I10 PRIMARY HYPERTENSION: ICD-10-CM

## 2023-09-26 LAB
ALBUMIN SERPL BCP-MCNC: 3.3 G/DL (ref 3.5–5.2)
ALP SERPL-CCNC: 65 U/L (ref 55–135)
ALT SERPL W/O P-5'-P-CCNC: 28 U/L (ref 10–44)
ANION GAP SERPL CALC-SCNC: 9 MMOL/L (ref 8–16)
ANION GAP SERPL CALC-SCNC: 9 MMOL/L (ref 8–16)
AST SERPL-CCNC: 27 U/L (ref 10–40)
BASOPHILS # BLD AUTO: 0.07 K/UL (ref 0–0.2)
BASOPHILS NFR BLD: 0.9 % (ref 0–1.9)
BILIRUB SERPL-MCNC: 0.4 MG/DL (ref 0.1–1)
BUN SERPL-MCNC: 16 MG/DL (ref 8–23)
BUN SERPL-MCNC: 16 MG/DL (ref 8–23)
CALCIUM SERPL-MCNC: 8.8 MG/DL (ref 8.7–10.5)
CALCIUM SERPL-MCNC: 8.8 MG/DL (ref 8.7–10.5)
CHLORIDE SERPL-SCNC: 110 MMOL/L (ref 95–110)
CHLORIDE SERPL-SCNC: 110 MMOL/L (ref 95–110)
CHOLEST SERPL-MCNC: 161 MG/DL (ref 120–199)
CHOLEST/HDLC SERPL: 4 {RATIO} (ref 2–5)
CO2 SERPL-SCNC: 23 MMOL/L (ref 23–29)
CO2 SERPL-SCNC: 23 MMOL/L (ref 23–29)
CREAT SERPL-MCNC: 0.8 MG/DL (ref 0.5–1.4)
CREAT SERPL-MCNC: 0.8 MG/DL (ref 0.5–1.4)
DIFFERENTIAL METHOD: ABNORMAL
EOSINOPHIL # BLD AUTO: 0.2 K/UL (ref 0–0.5)
EOSINOPHIL NFR BLD: 2.8 % (ref 0–8)
ERYTHROCYTE [DISTWIDTH] IN BLOOD BY AUTOMATED COUNT: 16.5 % (ref 11.5–14.5)
EST. GFR  (NO RACE VARIABLE): >60 ML/MIN/1.73 M^2
EST. GFR  (NO RACE VARIABLE): >60 ML/MIN/1.73 M^2
ESTIMATED AVG GLUCOSE: 114 MG/DL (ref 68–131)
GLUCOSE SERPL-MCNC: 115 MG/DL (ref 70–110)
GLUCOSE SERPL-MCNC: 115 MG/DL (ref 70–110)
HBA1C MFR BLD: 5.6 % (ref 4–5.6)
HCT VFR BLD AUTO: 43.2 % (ref 37–48.5)
HDLC SERPL-MCNC: 40 MG/DL (ref 40–75)
HDLC SERPL: 24.8 % (ref 20–50)
HGB BLD-MCNC: 13.7 G/DL (ref 12–16)
IMM GRANULOCYTES # BLD AUTO: 0.04 K/UL (ref 0–0.04)
IMM GRANULOCYTES NFR BLD AUTO: 0.5 % (ref 0–0.5)
LDLC SERPL CALC-MCNC: 99.6 MG/DL (ref 63–159)
LYMPHOCYTES # BLD AUTO: 2.3 K/UL (ref 1–4.8)
LYMPHOCYTES NFR BLD: 29.9 % (ref 18–48)
MCH RBC QN AUTO: 24.2 PG (ref 27–31)
MCHC RBC AUTO-ENTMCNC: 31.7 G/DL (ref 32–36)
MCV RBC AUTO: 76 FL (ref 82–98)
MONOCYTES # BLD AUTO: 0.7 K/UL (ref 0.3–1)
MONOCYTES NFR BLD: 9.6 % (ref 4–15)
NEUTROPHILS # BLD AUTO: 4.3 K/UL (ref 1.8–7.7)
NEUTROPHILS NFR BLD: 56.3 % (ref 38–73)
NONHDLC SERPL-MCNC: 121 MG/DL
NRBC BLD-RTO: 0 /100 WBC
PLATELET # BLD AUTO: 150 K/UL (ref 150–450)
PMV BLD AUTO: 11.2 FL (ref 9.2–12.9)
POTASSIUM SERPL-SCNC: 3.9 MMOL/L (ref 3.5–5.1)
POTASSIUM SERPL-SCNC: 3.9 MMOL/L (ref 3.5–5.1)
PROT SERPL-MCNC: 6.9 G/DL (ref 6–8.4)
RBC # BLD AUTO: 5.67 M/UL (ref 4–5.4)
SODIUM SERPL-SCNC: 142 MMOL/L (ref 136–145)
SODIUM SERPL-SCNC: 142 MMOL/L (ref 136–145)
TRIGL SERPL-MCNC: 107 MG/DL (ref 30–150)
WBC # BLD AUTO: 7.58 K/UL (ref 3.9–12.7)

## 2023-09-26 PROCEDURE — 36415 COLL VENOUS BLD VENIPUNCTURE: CPT | Mod: PO | Performed by: INTERNAL MEDICINE

## 2023-09-26 PROCEDURE — 80061 LIPID PANEL: CPT | Performed by: INTERNAL MEDICINE

## 2023-09-26 PROCEDURE — 85025 COMPLETE CBC W/AUTO DIFF WBC: CPT | Performed by: INTERNAL MEDICINE

## 2023-09-26 PROCEDURE — 83036 HEMOGLOBIN GLYCOSYLATED A1C: CPT | Performed by: INTERNAL MEDICINE

## 2023-09-26 PROCEDURE — 80053 COMPREHEN METABOLIC PANEL: CPT | Performed by: INTERNAL MEDICINE

## 2023-09-28 ENCOUNTER — PATIENT OUTREACH (OUTPATIENT)
Dept: ADMINISTRATIVE | Facility: HOSPITAL | Age: 68
End: 2023-09-28
Payer: MEDICARE

## 2023-09-28 ENCOUNTER — PATIENT MESSAGE (OUTPATIENT)
Dept: ADMINISTRATIVE | Facility: HOSPITAL | Age: 68
End: 2023-09-28
Payer: MEDICARE

## 2023-11-04 PROBLEM — M17.0 BILATERAL PRIMARY OSTEOARTHRITIS OF KNEE: Status: ACTIVE | Noted: 2023-11-04

## 2023-11-08 DIAGNOSIS — I10 ESSENTIAL HYPERTENSION: ICD-10-CM

## 2023-11-10 DIAGNOSIS — E78.00 PURE HYPERCHOLESTEROLEMIA: Primary | ICD-10-CM

## 2023-11-10 RX ORDER — CHLORTHALIDONE 25 MG/1
TABLET ORAL
Qty: 45 TABLET | Refills: 3 | Status: SHIPPED | OUTPATIENT
Start: 2023-11-10 | End: 2024-01-04

## 2023-11-28 ENCOUNTER — PATIENT OUTREACH (OUTPATIENT)
Dept: ADMINISTRATIVE | Facility: HOSPITAL | Age: 68
End: 2023-11-28
Payer: MEDICARE

## 2023-11-28 ENCOUNTER — PATIENT MESSAGE (OUTPATIENT)
Dept: ADMINISTRATIVE | Facility: HOSPITAL | Age: 68
End: 2023-11-28
Payer: MEDICARE

## 2023-11-29 ENCOUNTER — PATIENT MESSAGE (OUTPATIENT)
Dept: INTERNAL MEDICINE | Facility: CLINIC | Age: 68
End: 2023-11-29
Payer: MEDICARE

## 2023-11-30 ENCOUNTER — PATIENT MESSAGE (OUTPATIENT)
Dept: INTERNAL MEDICINE | Facility: CLINIC | Age: 68
End: 2023-11-30
Payer: MEDICARE

## 2023-11-30 DIAGNOSIS — I10 ESSENTIAL HYPERTENSION: ICD-10-CM

## 2023-12-01 RX ORDER — CANDESARTAN 4 MG/1
4 TABLET ORAL NIGHTLY
Qty: 90 TABLET | Refills: 3 | OUTPATIENT
Start: 2023-12-01

## 2023-12-01 NOTE — TELEPHONE ENCOUNTER
Refill Routing Note   Medication(s) are not appropriate for processing by [facility] for the following reason(s):        No active prescription written by provider  Required vitals abnormal    [facility] action(s):  Defer        Medication Therapy Plan: discontinued on 6/22/2022 by PROVIDER. Pt sending a [employer] request for this.      Appointments  past 12m or future 3m with PCP    Date Provider   Last Visit   9/5/2023 Faiza Kuhn MD   Next Visit   12/8/2023 Faiza Kuhn MD   ED visits in past 90 days: 0        Note composed:10:32 AM 12/01/2023

## 2023-12-22 ENCOUNTER — LAB VISIT (OUTPATIENT)
Dept: LAB | Facility: HOSPITAL | Age: 68
End: 2023-12-22
Attending: INTERNAL MEDICINE
Payer: MEDICARE

## 2023-12-22 DIAGNOSIS — E78.00 PURE HYPERCHOLESTEROLEMIA: ICD-10-CM

## 2023-12-22 LAB
ALBUMIN SERPL BCP-MCNC: 3.3 G/DL (ref 3.5–5.2)
ALP SERPL-CCNC: 78 U/L (ref 55–135)
ALT SERPL W/O P-5'-P-CCNC: 30 U/L (ref 10–44)
ANION GAP SERPL CALC-SCNC: 8 MMOL/L (ref 8–16)
AST SERPL-CCNC: 27 U/L (ref 10–40)
BILIRUB SERPL-MCNC: 0.4 MG/DL (ref 0.1–1)
BUN SERPL-MCNC: 17 MG/DL (ref 8–23)
CALCIUM SERPL-MCNC: 9.2 MG/DL (ref 8.7–10.5)
CHLORIDE SERPL-SCNC: 109 MMOL/L (ref 95–110)
CHOLEST SERPL-MCNC: 168 MG/DL (ref 120–199)
CHOLEST/HDLC SERPL: 3.5 {RATIO} (ref 2–5)
CO2 SERPL-SCNC: 25 MMOL/L (ref 23–29)
CREAT SERPL-MCNC: 0.8 MG/DL (ref 0.5–1.4)
EST. GFR  (NO RACE VARIABLE): >60 ML/MIN/1.73 M^2
GLUCOSE SERPL-MCNC: 108 MG/DL (ref 70–110)
HDLC SERPL-MCNC: 48 MG/DL (ref 40–75)
HDLC SERPL: 28.6 % (ref 20–50)
LDLC SERPL CALC-MCNC: 101.6 MG/DL (ref 63–159)
NONHDLC SERPL-MCNC: 120 MG/DL
POTASSIUM SERPL-SCNC: 4.1 MMOL/L (ref 3.5–5.1)
PROT SERPL-MCNC: 6.9 G/DL (ref 6–8.4)
SODIUM SERPL-SCNC: 142 MMOL/L (ref 136–145)
TRIGL SERPL-MCNC: 92 MG/DL (ref 30–150)

## 2023-12-22 PROCEDURE — 80053 COMPREHEN METABOLIC PANEL: CPT | Performed by: INTERNAL MEDICINE

## 2023-12-22 PROCEDURE — 36415 COLL VENOUS BLD VENIPUNCTURE: CPT | Mod: PO | Performed by: INTERNAL MEDICINE

## 2023-12-22 PROCEDURE — 80061 LIPID PANEL: CPT | Performed by: INTERNAL MEDICINE

## 2024-01-04 ENCOUNTER — OFFICE VISIT (OUTPATIENT)
Dept: CARDIOLOGY | Facility: CLINIC | Age: 69
End: 2024-01-04
Payer: MEDICARE

## 2024-01-04 VITALS
HEIGHT: 66 IN | BODY MASS INDEX: 47.09 KG/M2 | DIASTOLIC BLOOD PRESSURE: 53 MMHG | WEIGHT: 293 LBS | HEART RATE: 81 BPM | SYSTOLIC BLOOD PRESSURE: 106 MMHG

## 2024-01-04 DIAGNOSIS — I10 ESSENTIAL HYPERTENSION: ICD-10-CM

## 2024-01-04 DIAGNOSIS — I89.0 LYMPHEDEMA: Primary | ICD-10-CM

## 2024-01-04 DIAGNOSIS — E66.01 MORBID OBESITY WITH BMI OF 60.0-69.9, ADULT: ICD-10-CM

## 2024-01-04 DIAGNOSIS — I70.0 AORTIC ATHEROSCLEROSIS: ICD-10-CM

## 2024-01-04 DIAGNOSIS — E78.00 PURE HYPERCHOLESTEROLEMIA: ICD-10-CM

## 2024-01-04 PROCEDURE — 99213 OFFICE O/P EST LOW 20 MIN: CPT | Mod: S$PBB,,, | Performed by: INTERNAL MEDICINE

## 2024-01-04 PROCEDURE — 93005 ELECTROCARDIOGRAM TRACING: CPT | Mod: PBBFAC,PO | Performed by: INTERNAL MEDICINE

## 2024-01-04 PROCEDURE — 99215 OFFICE O/P EST HI 40 MIN: CPT | Mod: PBBFAC,25,PO | Performed by: INTERNAL MEDICINE

## 2024-01-04 PROCEDURE — 93010 ELECTROCARDIOGRAM REPORT: CPT | Mod: S$PBB,,, | Performed by: INTERNAL MEDICINE

## 2024-01-04 PROCEDURE — 99999 PR PBB SHADOW E&M-EST. PATIENT-LVL V: CPT | Mod: PBBFAC,,, | Performed by: INTERNAL MEDICINE

## 2024-01-04 NOTE — PROGRESS NOTES
Subjective:   Chief Complaint:  Joy Singer is a 68 y.o. female who presents for follow-up of Hyperlipidemia and Hypertension      Problem List and HPI:   Leg swelling  HTN  Sleep apnea  Pseudotumor cerebri  Multiple colonic polyp  BMI ~60    She had very frequent urination w chlorthalidone and therefore stopped taking it.   She takes acetazolamide for idiopathic intracranial hypertension (pseudotumor cerebri). No longer on an ARB for hypertension. Follows w Dr. Driver for lymphedema.  On pravastatin LDL is 100 mg/dl.   Unable to exercise.      Review of patient's allergies indicates:   Allergen Reactions    Insect venom Edema, Itching and Swelling    Adhesive tape-silicones Hives    Oxycodone-acetaminophen Itching and Other (See Comments)    Unable to assess Rash     Insect Bites    Venom-honey bee Rash and Other (See Comments)        Current Outpatient Medications   Medication Sig    acetaZOLAMIDE (DIAMOX) 250 MG tablet Take 250 mg by mouth 2 (two) times daily.     ascorbic acid, vitamin C, 500 mg TbSR Take 1 tablet by mouth once daily.    calcium carbonate-vitamin D3 600 mg(1,500mg) -500 unit Cap Calcium 600 with Vitamin D3 600 mg (1,500 mg)-500 unit capsule   Take 1 capsule twice a day by oral route.    carisoprodol (SOMA) 350 MG tablet Take 350 mg by mouth continuous prn.    cranberry fruit extract (CRANBERRY EXTRACT ORAL) Take 2 capsules by mouth once daily.    diphenhydrAMINE (BENADRYL) 25 mg capsule Take 25 mg by mouth every 6 (six) hours as needed for Itching.    docusate sodium (COLACE) 100 MG capsule Colace 100 mg capsule   Take 2 capsule twice a day by oral route.    folic acid (FOLVITE) 400 MCG tablet Take 400 mcg by mouth once daily.    hydrocodone-acetaminophen 7.5-325mg (NORCO) 7.5-325 mg per tablet TK 1 T PO Q 6 TO 8 H PRN    hydrocortisone (ANUSOL-HC) 2.5 % rectal cream Place rectally 2 (two) times daily. (Patient taking differently: Place rectally as needed.)    ketoconazole (NIZORAL) 2 %  "shampoo every 30 days.    ketoconazole 2 % Foam Extina 2 % topical foam as needed    Lactobac no.51/Bifidobact no.4 (UP4 PROBIOTICS ULTRA ORAL) Take 1 capsule by mouth once daily.    multivitamin-iron-folic acid (CENTRUM) Tab Take 1 tablet by mouth once daily.    polyethylene glycol (GLYCOLAX) 17 gram/dose powder Take 17 g by mouth once daily.    potassium chloride SA (K-DUR,KLOR-CON M) 10 MEQ tablet TAKE 2 TABLETS ONCE DAILY    pravastatin (PRAVACHOL) 40 MG tablet TAKE 1 TABLET DAILY    traZODone (DESYREL) 50 MG tablet TAKE 1 TABLET(50 MG) BY MOUTH EVERY EVENING    vitamin E 1000 UNIT capsule Take 1,000 Units by mouth once daily.         Social history:  Joy Singer  reports that she has never smoked. She has never been exposed to tobacco smoke. She has never used smokeless tobacco. She reports current alcohol use. She reports that she does not use drugs.      Objective:   BP (!) 106/53   Pulse 81   Ht 5' 6" (1.676 m)   Wt (!) 193.1 kg (425 lb 9.6 oz)   LMP 01/01/2004   BMI 68.69 kg/m²    Physical Exam  Constitutional:       Appearance: Normal appearance. She is well-developed.   Neck:      Vascular: No JVD.   Cardiovascular:      Rate and Rhythm: Normal rate and regular rhythm.      Pulses:           Radial pulses are 2+ on the right side and 2+ on the left side.      Heart sounds: S1 normal and S2 normal. No murmur heard.  Pulmonary:      Breath sounds: No decreased breath sounds, wheezing or rales.   Chest:      Chest wall: There is no dullness to percussion.   Abdominal:      Palpations: Abdomen is soft. There is no hepatomegaly or splenomegaly.      Tenderness: There is no abdominal tenderness.   Musculoskeletal:      Right lower leg: No edema.      Left lower leg: No edema.   Skin:     General: Skin is warm.      Findings: No bruising.      Nails: There is no clubbing.   Neurological:      Mental Status: She is alert and oriented to person, place, and time.   Psychiatric:         Speech: Speech " normal.         Behavior: Behavior normal.         Lipids:  Recent Labs   Lab 12/22/23  1013   LDL Cholesterol 101.6   HDL 48   Cholesterol 168      Renal:  Recent Labs   Lab 12/22/23  1013   Creatinine 0.8   Potassium 4.1   CO2 25   BUN 17     Liver:  Recent Labs   Lab 12/22/23  1013   AST 27   ALT 30     CBC:  Lab Results   Component Value Date    WBC 7.58 09/26/2023    HGB 13.7 09/26/2023    HCT 43.2 09/26/2023    MCV 76 (L) 09/26/2023     09/26/2023             Transthoracic echo (TTE) complete 11/2018    Interpretation Summary  · Left ventricle ejection fraction is normal at 68%  · Left ventricle shows concentric remodeling.  · Normal LV diastolic function.  · RV systolic function is normal.  · Right atrium is mildly dilated.  · Trace tricuspid regurgitation.  · Normal central venous pressure (3 mm Hg).  · The estimated PA systolic pressure is 31.30 mm Hg         Assessment and Plan:       ICD-10-CM ICD-9-CM   1. Lymphedema  I89.0 457.1   2. Morbid obesity with BMI of 60.0-69.9, adult  E66.01 278.01    Z68.44 V85.44   3. Aortic atherosclerosis  I70.0 440.0   4. Pure hypercholesterolemia  E78.00 272.0            Orders placed during this encounter:     Morbid obesity with BMI of 60.0-69.9, adult    Aortic atherosclerosis         No follow-ups on file.

## 2024-01-11 DIAGNOSIS — Z00.00 ENCOUNTER FOR MEDICARE ANNUAL WELLNESS EXAM: ICD-10-CM

## 2024-01-18 ENCOUNTER — HOSPITAL ENCOUNTER (OUTPATIENT)
Dept: RADIOLOGY | Facility: HOSPITAL | Age: 69
Discharge: HOME | End: 2024-01-18
Attending: NURSE PRACTITIONER
Payer: MEDICARE

## 2024-01-18 DIAGNOSIS — Z12.31 ENCOUNTER FOR SCREENING MAMMOGRAM FOR BREAST CANCER: ICD-10-CM

## 2024-01-18 PROCEDURE — 77067 SCR MAMMO BI INCL CAD: CPT | Mod: 26,,, | Performed by: RADIOLOGY

## 2024-01-18 PROCEDURE — 77063 BREAST TOMOSYNTHESIS BI: CPT | Mod: 26,,, | Performed by: RADIOLOGY

## 2024-01-18 PROCEDURE — 77067 SCR MAMMO BI INCL CAD: CPT | Mod: TC

## 2024-02-02 ENCOUNTER — OFFICE VISIT (OUTPATIENT)
Dept: INTERNAL MEDICINE | Facility: CLINIC | Age: 69
End: 2024-02-02
Payer: MEDICARE

## 2024-02-02 DIAGNOSIS — E66.01 MORBID OBESITY WITH BMI OF 60.0-69.9, ADULT: ICD-10-CM

## 2024-02-02 DIAGNOSIS — N18.32 CHRONIC KIDNEY DISEASE, STAGE 3B: ICD-10-CM

## 2024-02-02 DIAGNOSIS — E23.6 EMPTY SELLA SYNDROME: ICD-10-CM

## 2024-02-02 DIAGNOSIS — I10 ESSENTIAL HYPERTENSION: Primary | ICD-10-CM

## 2024-02-02 DIAGNOSIS — M17.0 BILATERAL PRIMARY OSTEOARTHRITIS OF KNEE: ICD-10-CM

## 2024-02-02 PROCEDURE — 99213 OFFICE O/P EST LOW 20 MIN: CPT | Mod: 95,,, | Performed by: INTERNAL MEDICINE

## 2024-02-02 NOTE — PROGRESS NOTES
Primary Care Telemedicine Note    The patient location is:  Patient Home     The chief complaint leading to consultation is: HTN, Knee Pain    Total time spent with patient: 30 minutes    Visit type: Virtual visit with synchronous audio only and video      Each patient to whom he or she provides medical services by telemedicine is:  (1) informed of the relationship between the physician and patient and the respective role of any other health care provider with respect to management of the patient; and (2) notified that he or she may decline to receive medical services by telemedicine and may withdraw from such care at any time.        Subjective:        Patient ID: Joy Singer is a 68 y.o. female.    Chief Complaint: Hypertension, Knee Pain, and Obesity      HPI    Hypertension: The patient reports no medication side effects noted, no chest pain on exertion, no dyspnea on exertion. Leg edema is chronic.    Reviewed old records and it appears that multiple antihypertensives were started but discontinued when the patient stopped using them. BP elevated based on recent readings. Will need to restart possibly ARB (candesartan).    BP Readings from Last 3 Encounters:   02/19/24 (!) 146/96   01/04/24 (!) 106/53   09/05/23 (!) 130/90       Knee Pain: The patient presents with knee pain involving both knees. Onset was sudden, not related to any specific activity. Inciting event: none known. Current symptoms include: stiffness. Pain is aggravated by any weight bearing.  Evaluation to date:  X-ray and Orthopedics, recommendation for knee replacement when BMI decreases . Treatment to date:  norco .      Wt Readings from Last 3 Encounters:   02/19/24 (!) 192.3 kg (423 lb 15.1 oz)   01/04/24 (!) 193.1 kg (425 lb 9.6 oz)   09/05/23 (!) 190.4 kg (419 lb 12.1 oz)   - eats large portions but has been cutting back  - BF- tangerine protein shake, L- ham sandwich and chips, D- rice, pasta and potatoes  - snacks: grapes,  tangerines, bananas, cookies, ice cream, ruffles potato chips  - she looks at food as a reward      Review of Systems   Constitutional:  Negative for chills, diaphoresis and fever.   HENT:  Negative for congestion, ear pain and sinus pressure.    Eyes:  Negative for discharge and visual disturbance.   Respiratory:  Negative for cough and shortness of breath.    Cardiovascular:  Negative for chest pain and palpitations.   Gastrointestinal:  Negative for abdominal pain, constipation, diarrhea, nausea and vomiting.   Endocrine: Negative for polydipsia and polyuria.   Musculoskeletal:  Positive for arthralgias (left shoulder pain due to rotator cuff injury and has right carpal tunnel syndrome, wears a brace). Negative for myalgias.   Skin:  Negative for rash and wound.   Neurological:  Positive for dizziness (occasional dizziness but has been present for a while). Negative for tremors, numbness and headaches.        Remains seated for most of the day           Objective:        Physical Exam  Constitutional:       General: She is not in acute distress.     Appearance: Normal appearance. She is well-developed. She is not toxic-appearing or diaphoretic.   HENT:      Head: Normocephalic and atraumatic.      Right Ear: Hearing normal.      Left Ear: Hearing normal.      Nose: No congestion.   Eyes:      General: Lids are normal.   Pulmonary:      Effort: No tachypnea or respiratory distress.   Musculoskeletal:      Cervical back: Normal range of motion.   Neurological:      Mental Status: She is alert and oriented to person, place, and time.      Comments: Alert and oriented   Psychiatric:         Attention and Perception: Attention normal.         Mood and Affect: Mood normal.         Behavior: Behavior is cooperative.               Assessment:       1. Essential hypertension    2. Bilateral primary osteoarthritis of knee    3. Chronic kidney disease, stage 3b    4. Empty sella syndrome    5. Morbid obesity with BMI of  60.0-69.9, adult              Plan:     1. Essential hypertension  - will monitor BP readings and then will discuss re-starting medications if needed    2. Bilateral primary osteoarthritis of knee  - relieved by Norco from her Orthopedist, will monitor    3. Chronic kidney disease, stage 3b  - stable, avoid nephrotoxins, maintain adequate water intake    4. Empty sella syndrome  - f/u with Dr. Can prn    5. Morbid Obesity  - unable to exercise due to chronic joint pain, will re-address at the next appointment    RTC in 3 months for follow-up or sooner if needed  __________________________    Faiza Kuhn MD, PharmD  [facility]  American Board of Obesity Medicine diplomate  Office [phone]

## 2024-02-19 ENCOUNTER — OFFICE VISIT (OUTPATIENT)
Dept: CARDIOLOGY | Facility: CLINIC | Age: 69
End: 2024-02-19
Payer: MEDICARE

## 2024-02-19 VITALS
HEART RATE: 70 BPM | WEIGHT: 293 LBS | HEIGHT: 66 IN | SYSTOLIC BLOOD PRESSURE: 146 MMHG | BODY MASS INDEX: 47.09 KG/M2 | DIASTOLIC BLOOD PRESSURE: 96 MMHG

## 2024-02-19 DIAGNOSIS — I89.0 LYMPHEDEMA: ICD-10-CM

## 2024-02-19 DIAGNOSIS — G47.33 OSA ON CPAP: ICD-10-CM

## 2024-02-19 DIAGNOSIS — E66.01 MORBID OBESITY: ICD-10-CM

## 2024-02-19 DIAGNOSIS — I87.2 VENOUS STASIS DERMATITIS OF BOTH LOWER EXTREMITIES: ICD-10-CM

## 2024-02-19 DIAGNOSIS — I89.0 LYMPHEDEMA OF BOTH LOWER EXTREMITIES: Primary | ICD-10-CM

## 2024-02-19 PROCEDURE — 99999 PR PBB SHADOW E&M-EST. PATIENT-LVL V: CPT | Mod: PBBFAC,,, | Performed by: INTERNAL MEDICINE

## 2024-02-19 PROCEDURE — 99215 OFFICE O/P EST HI 40 MIN: CPT | Mod: PBBFAC,PO | Performed by: INTERNAL MEDICINE

## 2024-02-19 PROCEDURE — 99205 OFFICE O/P NEW HI 60 MIN: CPT | Mod: S$PBB,,, | Performed by: INTERNAL MEDICINE

## 2024-02-19 RX ORDER — DOXYCYCLINE 100 MG/1
100 CAPSULE ORAL EVERY 12 HOURS
Qty: 20 CAPSULE | Refills: 0 | Status: SHIPPED | OUTPATIENT
Start: 2024-02-19 | End: 2024-02-29

## 2024-02-19 NOTE — PROGRESS NOTES
[facility]      Chief Complaint   Patient presents with    lymphedema       Patient ID: Joy Singer is a 68 y.o. female with HLD, LIZETH, morbid obesity, who presents for an initial appointment.  Pertinent history/events are as follows:     -Pt kindly referred by Dr. Mccallum for evaluation of lymphedema.    HPI:  Mrs. [place] reports leg swelling for several years.  She reports no claudication symptoms.        Past Medical History:   Diagnosis Date    BRCA1 negative     BRCA2 negative     Breast cyst     Carotid artery occlusion     Chronic back pain     Chronic bilateral low back pain without sciatica 11/08/2016    Colon polyps 2016    COVID-19 09/27/2022    Fibrocystic breast     HA (headache)     Hyperlipidemia     Morbid obesity with BMI of 60.0-69.9, adult     Obstructive sleep apnea (adult) (pediatric)     Pseudotumor cerebri      Past Surgical History:   Procedure Laterality Date    BLADDER SUSPENSION  2004    BREAST BIOPSY Bilateral 2003 and 2011    Core bx's, benign    BREAST CYST ASPIRATION      BREAST CYST EXCISION      CARPAL TUNNEL RELEASE  2000    COLONOSCOPY  2008 and 2011    eye growth removal  2010    FRACTURE SURGERY      HYSTERECTOMY  2004    OOPHORECTOMY      rotator cuff surgery  2011    left shoulder    toselectomy  1962     Social History     Socioeconomic History    Marital status:    Occupational History    Occupation: Retired    Tobacco Use    Smoking status: Never     Passive exposure: Never    Smokeless tobacco: Never   Substance and Sexual Activity    Alcohol use: Yes     Comment: social once or twice a year    Drug use: No    Sexual activity: Yes     Partners: Male     Birth control/protection: None     Comment: socially   Social History Narrative    She is caregiver for her      Social Determinants of Health     Financial Resource Strain: Low Risk  (2/2/2024)    Overall Financial Resource Strain (CARDIA)     Difficulty of Paying Living  Expenses: Not hard at all   Food Insecurity: No Food Insecurity (2/2/2024)    Hunger Vital Sign     Worried About Running Out of Food in the Last Year: Never true     Ran Out of Food in the Last Year: Never true   Transportation Needs: No Transportation Needs (2/2/2024)    PRAPARE - Transportation     Lack of Transportation (Medical): No     Lack of Transportation (Non-Medical): No   Physical Activity: Inactive (2/2/2024)    Exercise Vital Sign     Days of Exercise per Week: 0 days     Minutes of Exercise per Session: 0 min   Stress: No Stress Concern Present (2/2/2024)    [place] [employer] - Occupational Stress Questionnaire     Feeling of Stress : Not at all   Social Connections: Unknown (2/2/2024)    Social Connection and Isolation Panel [NHANES]     Frequency of Communication with Friends and Family: More than three times a week     Frequency of Social Gatherings with Friends and Family: Patient declined     Active Member of Clubs or Organizations: Yes     Attends Club or Organization Meetings: 1 to 4 times per year     Marital Status:    Housing Stability: Low Risk  (2/2/2024)    Housing Stability Vital Sign     Unable to Pay for Housing in the Last Year: No     Number of Places Lived in the Last Year: 1     Unstable Housing in the Last Year: No     Family History   Problem Relation Age of Onset    Emphysema Mother     Lung cancer Mother     Hypertension Father     Heart failure Father     Diabetes Father     Emphysema Father     Heart failure Sister     Valvular heart disease Sister     Breast cancer Maternal Aunt         50s    Breast cancer Maternal Cousin 68        daughter of aunt dx'd in 50s    Breast cancer Maternal Cousin 64        mat 1st cousin, mother unaffected, myRisk with Locate Special Diet 2017 was negative    Cancer Paternal Grandmother         brain cancer    Breast cancer Maternal Aunt 68    Lung cancer Maternal Aunt     Lung cancer Maternal Uncle     Lung cancer Maternal  Uncle     Lung cancer Maternal Uncle     Lung cancer Maternal Uncle         in addition to mesothelioma    Breast cancer Maternal Cousin         1st cousin, daughter of unaffected aunt    Breast cancer Maternal Cousin         mother's brother's daughter    Ovarian cancer Neg Hx        Review of patient's allergies indicates:   Allergen Reactions    Insect venom Edema, Itching and Swelling    Adhesive tape-silicones Hives    Oxycodone-acetaminophen Itching and Other (See Comments)    Unable to assess Rash     Insect Bites    Venom-honey bee Rash and Other (See Comments)       Medication List with Changes/Refills   Current Medications    ACETAZOLAMIDE (DIAMOX) 250 MG TABLET    Take 250 mg by mouth 2 (two) times daily.     ASCORBIC ACID, VITAMIN C, 500 MG TBSR    Take 1 tablet by mouth once daily.    CALCIUM CARBONATE-VITAMIN D3 600 MG(1,500MG) -500 UNIT CAP    Calcium 600 with Vitamin D3 600 mg (1,500 mg)-500 unit capsule   Take 1 capsule twice a day by oral route.    CARISOPRODOL (SOMA) 350 MG TABLET    Take 350 mg by mouth continuous prn.    CRANBERRY FRUIT EXTRACT (CRANBERRY EXTRACT ORAL)    Take 2 capsules by mouth once daily.    DIPHENHYDRAMINE (BENADRYL) 25 MG CAPSULE    Take 25 mg by mouth every 6 (six) hours as needed for Itching.    DOCUSATE SODIUM (COLACE) 100 MG CAPSULE    Colace 100 mg capsule   Take 2 capsule twice a day by oral route.    FOLIC ACID (FOLVITE) 400 MCG TABLET    Take 400 mcg by mouth once daily.    HYDROCODONE-ACETAMINOPHEN 7.5-325MG (NORCO) 7.5-325 MG PER TABLET    TK 1 T PO Q 6 TO 8 H PRN    HYDROCORTISONE (ANUSOL-HC) 2.5 % RECTAL CREAM    Place rectally 2 (two) times daily.    KETOCONAZOLE (NIZORAL) 2 % SHAMPOO    every 30 days.    KETOCONAZOLE 2 % FOAM    Extina 2 % topical foam as needed    LACTOBAC NO.51/BIFIDOBACT NO.4 (UP4 PROBIOTICS ULTRA ORAL)    Take 1 capsule by mouth once daily.    MULTIVITAMIN-IRON-FOLIC ACID (CENTRUM) TAB    Take 1 tablet by mouth once daily.    POLYETHYLENE  GLYCOL (GLYCOLAX) 17 GRAM/DOSE POWDER    Take 17 g by mouth once daily.    POTASSIUM CHLORIDE SA (K-DUR,KLOR-CON M) 10 MEQ TABLET    TAKE 2 TABLETS ONCE DAILY    PRAVASTATIN (PRAVACHOL) 40 MG TABLET    TAKE 1 TABLET DAILY    TRAZODONE (DESYREL) 50 MG TABLET    TAKE 1 TABLET(50 MG) BY MOUTH EVERY EVENING    VITAMIN E 1000 UNIT CAPSULE    Take 1,000 Units by mouth once daily.       Review of Systems  Constitution: Denies chills, fever, and sweats.  HENT: Denies headaches or blurry vision.  Cardiovascular: Denies chest pain or irregular heart beat.  Respiratory: Denies cough or shortness of breath.  Gastrointestinal: Denies abdominal pain, nausea, or vomiting.  Musculoskeletal: Denies muscle cramps.  Neurological: Denies dizziness or focal weakness.  Psychiatric/Behavioral: Normal mental status.  Hematologic/Lymphatic: Denies bleeding problem or easy bruising/bleeding.  Skin: Denies rash or suspicious lesions    Physical Examination  [facility] 01/01/2004     Constitutional: No acute distress, conversant  HEENT: Sclera anicteric, Pupils equal, round and reactive to light, extraocular motions intact, Oropharynx clear  Neck: No JVD, no carotid bruits  Cardiovascular: regular rate and rhythm, no murmur, rubs or gallops, normal S1/S2  Pulmonary: Clear to auscultation bilaterally  Abdominal: Abdomen soft, nontender, nondistended, positive bowel sounds  Extremities: No lower extremity edema,   Pulses:  Carotid pulses are 2+ on the right side, and 2+ on the left side.  Radial pulses are 2+ on the right side, and 2+ on the left side.   Femoral pulses are 2+ on the right side, and 2+ on the left side.  Popliteal pulses are 2+ on the right side, and 2+ on the left side.   Dorsalis pedis pulses are 2+ on the right side, and 2+ on the left side.   Posterior tibial pulses are 2+ on the right side, and 2+ on the left side.    Skin: No ecchymosis, erythema, or ulcers  Psych: Alert and oriented x 3, appropriate affect  Neuro: CNII-XII  "intact, no focal deficits    Labs:  Most Recent Data  CBC:   Lab Results   Component Value Date    WBC 7.58 09/26/2023    HGB 13.7 09/26/2023    HCT 43.2 09/26/2023     09/26/2023    MCV 76 (L) 09/26/2023    RDW 16.5 (H) 09/26/2023     BMP:   Lab Results   Component Value Date     12/22/2023    K 4.1 12/22/2023     12/22/2023    CO2 25 12/22/2023    BUN 17 12/22/2023    CREATININE 0.8 12/22/2023     12/22/2023    CALCIUM 9.2 12/22/2023     LFTS;   Lab Results   Component Value Date    PROT 6.9 12/22/2023    ALBUMIN 3.3 (L) 12/22/2023    BILITOT 0.4 12/22/2023    AST 27 12/22/2023    ALKPHOS 78 12/22/2023    ALT 30 12/22/2023     COAGS: No results found for: "INR", "PROTIME", "PTT"  FLP:   Lab Results   Component Value Date    CHOL 168 12/22/2023    HDL 48 12/22/2023    LDLCALC 101.6 12/22/2023    TRIG 92 12/22/2023    CHOLHDL 28.6 12/22/2023     CARDIAC:   Lab Results   Component Value Date    BNP 74 03/27/2019       Imaging:    Echo 11/8/2018:  Left ventricle ejection fraction is normal at 68%  Left ventricle shows concentric remodeling.  Normal LV diastolic function.  RV systolic function is normal.  Right atrium is mildly dilated.  Trace tricuspid regurgitation.  Normal central venous pressure (3 mm Hg).  The estimated PA systolic pressure is 31.30 mm Hg    Assessment/Plan:  Joy Singer is a 68 y.o. female with HLD, LIZETH, morbid obesity, who presents for an initial appointment.    Lymphedema- Check BLE venous reflux study and PRATIBHA study.  Treat with doxycycline 100 mg bid for 10 days.  Refer to lymphedema clinic.  Recommend wearing graduated compression hose.  Limit sodium intake to 2000 mg daily.  Limit volume intake to 1.5 L daily.  Elevate legs when resting.    2. HLD- Continue pravasatin 40 mg daily.    3. Morbid Obesity- Encourage diet, exercise, and weight loss.    Follow up in 5 months     Total duration of face to face visit time 30 minutes.  Total time spent counseling " greater than fifty percent of total visit time.  Counseling included discussion regarding imaging findings, diagnosis, possibilities, treatment options, risks and benefits.  The patient had many questions regarding the options and long-term effects.    Andrew Driver MD, PhD  Interventional Cardiology

## 2024-02-19 NOTE — PATIENT INSTRUCTIONS
Assessment/Plan:  Joy Singer is a 68 y.o. female with HLD, LIZETH, morbid obesity, who presents for an initial appointment.    Lymphedema- Check BLE venous reflux study and PRATIBHA study.  Treat with doxycycline 100 mg bid for 10 days.  Refer to lymphedema clinic.  Recommend wearing graduated compression hose.  Limit sodium intake to 2000 mg daily.  Limit volume intake to 1.5 L daily.  Elevate legs when resting.    2. HLD- Continue pravasatin 40 mg daily.    3. Morbid Obesity- Encourage diet, exercise, and weight loss.    Follow up in 5 months

## 2024-02-22 ENCOUNTER — PATIENT MESSAGE (OUTPATIENT)
Dept: CARDIOLOGY | Facility: CLINIC | Age: 69
End: 2024-02-22
Payer: MEDICARE

## 2024-02-23 ENCOUNTER — TELEPHONE (OUTPATIENT)
Dept: CARDIOLOGY | Facility: CLINIC | Age: 69
End: 2024-02-23
Payer: MEDICARE

## 2024-02-23 NOTE — TELEPHONE ENCOUNTER
----- Message from Greta Becker sent at 2/23/2024 11:07 AM CST -----  Regarding: return call  Madison Bhandari is returning your call and can be reached at [phone].    Thank you

## 2024-02-28 ENCOUNTER — HOSPITAL ENCOUNTER (OUTPATIENT)
Dept: CARDIOLOGY | Facility: HOSPITAL | Age: 69
Discharge: HOME | End: 2024-02-28
Attending: INTERNAL MEDICINE
Payer: MEDICARE

## 2024-02-28 ENCOUNTER — TELEPHONE (OUTPATIENT)
Dept: CARDIOLOGY | Facility: CLINIC | Age: 69
End: 2024-02-28
Payer: MEDICARE

## 2024-02-28 DIAGNOSIS — M79.89 LEG SWELLING: ICD-10-CM

## 2024-02-28 DIAGNOSIS — I89.0 LYMPHEDEMA OF BOTH LOWER EXTREMITIES: ICD-10-CM

## 2024-02-28 DIAGNOSIS — M79.89 LEG SWELLING: Primary | ICD-10-CM

## 2024-02-28 PROCEDURE — 93998 UNLISTD NONINVAS VASC DX STD: CPT | Mod: PO

## 2024-02-28 PROCEDURE — 93998 UNLISTD NONINVAS VASC DX STD: CPT | Mod: S$PBB,,, | Performed by: INTERNAL MEDICINE

## 2024-02-29 LAB
LEFT TBI: 1.14
LEFT TOE PRESSURE: 180 MMHG
RIGHT ARM BP: 158 MMHG
RIGHT TBI: 1.31
RIGHT TOE PRESSURE: 207 MMHG

## 2024-03-03 ENCOUNTER — PATIENT MESSAGE (OUTPATIENT)
Dept: CARDIOLOGY | Facility: CLINIC | Age: 69
End: 2024-03-03
Payer: MEDICARE

## 2024-03-06 DIAGNOSIS — I89.0 LYMPHEDEMA OF BOTH LOWER EXTREMITIES: Primary | ICD-10-CM

## 2024-03-16 DIAGNOSIS — G47.00 INSOMNIA, UNSPECIFIED TYPE: ICD-10-CM

## 2024-03-16 RX ORDER — TRAZODONE HYDROCHLORIDE 50 MG/1
TABLET ORAL
Qty: 90 TABLET | Refills: 3 | Status: SHIPPED | OUTPATIENT
Start: 2024-03-16

## 2024-03-16 NOTE — TELEPHONE ENCOUNTER
No care due was identified.  [facility] Embedded Care Due Messages. Reference number: [id number].   3/16/2024 3:26:01 AM CDT

## 2024-03-16 NOTE — TELEPHONE ENCOUNTER
Refill Decision Note   Joy Enmanuel  is requesting a refill authorization.  Brief Assessment and Rationale for Refill:  Approve     Medication Therapy Plan:        Comments:     Note composed:10:24 AM 03/16/2024

## 2024-03-20 ENCOUNTER — DOCUMENTATION ONLY (OUTPATIENT)
Dept: CARDIOLOGY | Facility: CLINIC | Age: 69
End: 2024-03-20
Payer: MEDICARE

## 2024-03-20 ENCOUNTER — CLINICAL SUPPORT (OUTPATIENT)
Dept: REHABILITATION | Facility: HOSPITAL | Age: 69
End: 2024-03-20
Payer: MEDICARE

## 2024-03-20 DIAGNOSIS — M79.89 LEG SWELLING: Primary | ICD-10-CM

## 2024-03-20 DIAGNOSIS — I89.0 LYMPHEDEMA OF BOTH LOWER EXTREMITIES: ICD-10-CM

## 2024-03-20 DIAGNOSIS — I87.2 VENOUS STASIS DERMATITIS OF BOTH LOWER EXTREMITIES: ICD-10-CM

## 2024-03-20 PROCEDURE — 97535 SELF CARE MNGMENT TRAINING: CPT

## 2024-03-20 PROCEDURE — 97140 MANUAL THERAPY 1/> REGIONS: CPT

## 2024-03-20 PROCEDURE — 97163 PT EVAL HIGH COMPLEX 45 MIN: CPT

## 2024-03-20 NOTE — PROGRESS NOTES
Dr. Rashid's Rectal Surgery Discharge Instructions:    1. Increase activity as tolerated. You may exercise if it does not cause pain. Use a pillow or ring to sit on during travel in the car or prolonged sitting.    2. You may shower and soak in the tub as needed for pain. Warm water Sitz baths are encouraged to allow drainage from the wound and for pain control.    3. You may return to work as tolerated if your pain is adequately controlled.    4. There may be some bleeding that you notice over the next few days. Wear a pad as needed to avoid damage to your clothes and change the pad every few hours.    5. You should begin to have bowel movements in the next 1-2 days. The pain medications may cause constipation. If you notice constipation, start taking Miralax or Milk of magnesia (both available over the counter) once or twice a day to start having bowel movements. You must drink extra water when taking these medications to avoid further constipation. You should not be straining on the toilet. This creates swelling and may make your pain worse.    6. Call Dr. Rashid's office at [phone]  if you notice excessive bleeding, pain, fevers, chills, constipation, or have difficulty urinating. This office number is answered 24 hours per day.    7. If you feel that you will need a refill on your pain medication, please be aware that it is our clinic policy that we do not refill narcotic pain medications on the weekends. Please call during normal business hours, 8:30 am - 5 pm, Monday through Friday.     8. Wound Care: all sutures dissolve. Wear pad as needed for drainage    Home Instruction Sheet  ANESTHESIA for ADULT       What are the side effects?  Side effects depend on the medication used, and may not even be present.    Most Common Sometimes   Irritability  Poor Balance  Sleeping for Several Hours  Drowsiness  Fatigue  Difficulty Concentrating Change in Behavior  Hyperactivity  Nausea (upset stomach)  Gas  See initial eval in POC     (flatulence)  Dizziness  Hiccups  Constipation  Blurred Vision     1. The effects of sedation medicine can last up to 24 hours.  You may be drowsy or irritable for 2 to 8 hours after receiving medicine.  2. You may need to sleep after leaving the testing area.  Sleeping after sedation will help reduce irritability.  3. Diet:  - Do not give anything to eat or drink until you are fully awake.  Eat a light meal and advance to a normal diet unless instructed differently:   - Do not drink alcohol beverages for the next 24 hours.  - Avoid greasy or spicy foods today.  4. Activity:  - You may be dizzy and/or unsteady.  Ask for help walking, using the bathroom, or stairs to protect yourself from injury.  - You should not drive a vehicle, operate machinery or power tools for 24 hours because your reflexes may be slow and your vision may be blurry.  - Do not sign any legally binding documents or make important decisions for 24 hours after receiving sedation.  5. Medications:   Unless told otherwise, do not take any non-prescription medications (cold medicine, etc.) for 24 hours, as these medications in combination with sedation medication, can cause increased drowsiness.  If you feel that you need over the counter medication, discuss with your physician.    When Should I Call the Doctor?  - Vomiting more than twice.  - Extreme irritability or unusual changes in behavior.  - Trouble arousing.  - Inability to urinate.  - Trouble breathing - call 911.    We would like to take this opportunity to thank you for choosing [facility]. Because your confidence in your caregivers is very important to us, it is our commitment to always treat you and your family with courtesy and respect. Our goal is to always answer any questions or worries or concerns you may have about the care you received If you have any suggestions for improvement or worries or concerns please do not hesitate to let us know.

## 2024-03-20 NOTE — PLAN OF CARE
[facility]  Physical Therapy Initial Evaluation    Name: Joy Singer  Clinic Number: [id number]    Therapy Diagnosis:   Encounter Diagnoses   Name Primary?    Lymphedema of both lower extremities     Leg swelling Yes    Venous stasis dermatitis of both lower extremities      Physician: Andrew Driver MD*    Physician Orders: PT Eval and Treat - lymphedema  Medical Diagnosis from Referral:   Diagnosis   I89.0 (ICD-10-CM) - Lymphedema of both lower extremities   Evaluation Date: 3/20/2024  Authorization Period Expiration: 2/19/25  Plan of Care Expiration: 6/12/24  Visit # / Visits authorized: 1/ 1    Time In: 400p  Time Out: 510p  Total Billable Time: 70 minutes    Precautions: Standard, Fall, and HTN, morbid obesity, CKD 3b - Silicone and tape sensitivity    Subjective   Date of onset: swelling in both legs this bad for years,  2013 had broke her arm with 2 surgeries and since function has declined, ORIF and  then bone graft from hip   History of current condition - Joy reports: pt was having terrible foot pain 2010 or so on- painful to walk on, 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  Dealing with chronic LBP - no surgery.  Mostly in wc for distances. Pt uses 2 canes in house for short distances- can't use a walker in house due to size.  Cellulitis was considered - recently bumps in R leg - bumps and blisters - completed course of antibiotics 2/19/24.  Not sure if was infection.  Denies DVT.  Both knees OA/DJD not a surgical candidate due to weight and circulation.    Phone conversation 3/20/24 9am - pt questions evaluation and treatment - states unable to tolerate touch, compression, positioning - advised on evaluation, potential modifications, components of therapy - choice if able to commit to attendance and compression, choice if choosing OP PT.     Pt denies CHF, chart notes CKD3b, denies DM, pre DM, denies CA.    Fluid pill- should be, can't due to bathroom access - not taking  Blood thinner- no    Imaging: US unable to tolerate full testing due to table and LBP  2/28/24:   Reason for Exam  Priority: Routine  Dx: Leg swelling [M79.89 (ICD-10-CM)]     Conclusion     Resting PRATIBHA's are normal. Waveforms are normal.    Medical History:   Past Medical History:   Diagnosis Date    BRCA1 negative     BRCA2 negative     Breast cyst     Carotid artery occlusion     Chronic back pain     Chronic bilateral low back pain without sciatica 11/08/2016    Colon polyps 2016    COVID-19 09/27/2022    Fibrocystic breast     HA (headache)     Hyperlipidemia     Morbid obesity with BMI of 60.0-69.9, adult     Obstructive sleep apnea (adult) (pediatric)     Pseudotumor cerebri        Surgical History:   Joy Singer  has a past surgical history that includes eye growth removal (2010); Carpal tunnel release (2000); rotator cuff surgery (2011); Hysterectomy (2004); Bladder suspension (2004); toselectomy (1962); Colonoscopy (2008 and 2011); Fracture surgery; Breast biopsy (Bilateral, 2003 and 2011); Breast cyst aspiration; Oophorectomy; and Breast cyst excision.    Medications:   Joy has a current medication list which includes the following prescription(s): acetazolamide, ascorbic acid (vitamin c), calcium carbonate-vitamin d3, carisoprodol, cranberry fruit extract, diphenhydramine, docusate sodium, folic acid, hydrocodone-acetaminophen, hydrocortisone, ketoconazole, ketoconazole, lactobac no.51/bifidobact no.4, centrum, polyethylene glycol, potassium chloride sa, pravastatin, trazodone, and vitamin e.    Allergies:   Review of patient's allergies indicates:   Allergen Reactions    Insect venom Edema, Itching and Swelling    Adhesive tape-silicones Hives    Oxycodone-acetaminophen Itching and Other (See Comments)    Unable to assess Rash     Insect Bites    Venom-honey bee Rash and Other (See Comments)      Previous Lymphedema Treatment:  no  Prior Therapy: yes, L arm surgery x 2 with nerve issues, 2 shoulder surgeries  Social History: lives with spouse, + driving    Occupation: retired educator 2013  Environmental barriers: level home, 1 step to porch, 1 step in- slowly, pain, 2 canes, able to walk inclines as needed due to heavy wc   Abuse/Neglect: none noted    Nutritional status: BMI 68   Educational needs: met   Spiritual/Cultural: met   Fall risk: fall with wc catching in slab - flipped over- last fall 2013     Prior Level of Function: mod I- shower chair, long  handle shower, unable to reach feet, slip on crocs   must apply socks - hard to fit  Current Level of Function: same  Gait: amb household distances 2 canes   Transfers:mod I   Bed Mobility: MOD I - regular bed     Pain and Swelling:  Current 1/10, worst 5/10, best 1/10   Location: bilateral legs, knees down   Feet/ankles, knees and back with joint pains - legs from swelling not true pain- pain to touch  Description: Aching with pressure sharp  Aggravating Factors: Sitting and legs down  Easing Factors: rest and elevation    Pts goals: reduce size,shape, improve mobility and comfort in feet/legs    Objective               Female to dept via wc, crocs no compression,   present, able to amb short distances with 2 canes  Amount of Swelling/Location of Swelling: obesity with mod / severe size/shape of lower legs, ankles and feet, fullness of thighs and abdomen of obesity / body habitus  Modified for HOB elevated, knees flexed   Skin Integrity: pink/red discoloration, dry, nodular density, peau d'orange skin changes, dependant rubor   Palpation/Texture: pitting ankles, feet and distal legs, dense, firm    + B Stemmer Sign  - B Vasu's Sign- legs are tender throughout  Circulation: intact     Posture: forward flexion to canes, slight knee flex     Range of Motion - LE  Arom knee, ankle sitting or supine  (R)  stiff knee, lacks ~ 5 ext DF 5  (L)  back pain DF  0    Strength: functional screen of AROM against gravity and sit to stand transfers  Not formally asssessed due to size and position  Able to stand with use of hands to 2 canes     Five Times Sit to Stand Test: nt    Sensation: intact to lt touch B LE    Girth Measurements (in centimeters) required 1/2 bolster under knee in extension  LANDMARK LEFT LE  3/20/24 RIGHT LE  3/20/24 DIFF   at eval   SBP 80.0 cm 77.0 cm 3.0 cm   10 below SBP 59.0 cm 56.0 cm 3.0 cm   20 below SBP 58.0 cm 59.0 cm 1.0 cm   30 below SBP 46.0 cm 46.0 cm 0 cm   35 below SBP 38.0 cm 38.5 cm 0.5 cm   Ankle 35.0 cm 34.0 cm 1.0 cm   Forefoot 28.0 cm 27.0 cm 1.0 cm     CMS Impairment/Limitation/Restriction for FOTO Survey  Therapist reviewed FOTO scores for Joy Singer on 3/20/2024.   FOTO documents entered into [employer] - see Media section.     TREATMENT   Treatment Time In: 440p  Treatment Time Out: 510p  Total Treatment time separate from Evaluation: 30 minutes    Joy received therapeutic exercises to develop strength, endurance, ROM, flexibility, and posture for 10 minutes including:  Pt was instructed in and performed Gastroc Soleus Stretching for ankle ROM, muscle pump support and ankle pump mobility.  Pt was given written/illustrated home exercise program to perform daily.  GSS with strap or towel in long sitting - 3 x 20 seconds 1x daily each leg  2.   GSS with strap or towel in chair with cueing for long spine and lean forward - 3 x 20 seconds 1x daily each leg  Aps, knee ROM, avoid dependency, avoid immobility, elevation, walking with compression, deep breathing, use of muscle pump to assist venous return    Joy received the following manual therapy techniques: Manual Lymphatic Drainage as plan of care and compression were applied to the: B LE for 15 minutes, including:  Education and training in compression needs.  Complete Decongestive Therapy components and management with goals and plan of care review.    Demo of Manual  Lymphatic Drainage and short stretch compression bandaging.     Pt was provided with an option for Tubigrip G to knee double wrap to foot   Applied to R and L  LE + Crocs   present and was trained in don/doff assist. .  Pros/cons of basic compression choices were fully reviewed with noted limited size selection, length choice, lessor tier of product, benefits of low cost, relative ease of application and comfort, and additional items available for use.  Pt was trained on don/doff and methods to apply with assistance of gloves or family member.  Position and fit with appropriate girth, length and support are required.   Daytime use with removal for sleep.   Wear schedules and wash schedules confirmed.   Provided information to consider options, use as temporary option until medical grade choices are available.     Self Care/Home Management / Functional Therapeutic Activity training for 10 minutes including:   Velcro Wraps may be required for size/shape and ability to apply  Modifications for comfort and support due to size/shape, knee and back pain for positions and tolerance of therapy components  Advised on obstacles with obesity and limited mobility  Present compression: none  Pt was educated in potential compression needs.  Demo of products including socks, garments, and Inelastic Velcro wraps.   Discussed cost/coverage and authorization per insurance with Durable Medical Equipment(DME) provider.  Compression require orders from referring provider and coverage or purchase of products from DME or self order.      Discussed wear schedule, don/doff, wash and management of products.  Size and compression class and AM/PM needs.    Consideration for tubigrip as form of temporary compression use until securing compression needs.   Consideration for shorts/tights or capris style compression to compliment lower leg compression to support size/shape of LE.   Product information provided.   Vendor list  provided.    Informed insurance coverage of compression is per DME provider and Medicare and Medicare group plans may cover cost or OOP expense.   Commitment to attendance as well as commitment to securing compression needs is critical to edema management.      Home Exercises and Patient Education Provided  Education provided:   - tubigrip, aps and GSS, movement and change of position, skin care / hygiene  Infection risk   - Pt was educated in lymphedema etiology and management plans.  Pt was provided with written risk reductions and precautions for managing lymphedema.      This patient is in agreement to participate in Lymphedema treatment.    Written Home Exercises Provided: yes.  Exercises were reviewed and Joy was able to demonstrate them prior to the end of the session.  Joy demonstrated good  understanding of the education provided.     See EMR under Patient Instructions for exercises provided 3/20/2024.    Assessment   Joy is a 68 y.o. female referred to outpatient Physical Therapy with a medical diagnosis of   Diagnosis   I89.0 (ICD-10-CM) - Lymphedema of both lower extremities   This patient presents s/p obesity, long standing LBP, knee pain, chart listed CKD3b, unable to tolerate positioning for US testing/studies, voiced intolerance to positioning for treatments, compression intolerance, limited ability to reach her feet, painful sensitive legs and feet  resulting in: obesity related venous lymphedema of the BLE, increased pain, increased stiffness in the ankles, legs, back, history of antibiotics for cellulitis, dependant rubor, difficulty with shoes and compression size/fit/tolerance, as well as difficulty performing walking, elevation, compression choices, compression needs, and placing the pt at higher risk of infection.   Severity and chronicity of LE coupled with multiple medical co morbidities including morbid obesity will not allow for successful management of this condition unless other  areas are addressed or minimized. Compression options may be limited by size, shape, fit and wear tolerance as well as possible need for custom sizing with related cost considerations. Limited options and expected modifications per tolerance. Therapy may assist with education and training regarding need for compression support. Compliance with purchase of compression needs and compliance with daily use are required.    Pt prognosis is Fair.   Pt will benefit from skilled outpatient Physical Therapy to address the deficits stated above and in the chart below, provide pt/family education, and to maximize pt's level of independence.     Plan of care discussed with patient: Yes  Pt's spiritual, cultural and educational needs considered and patient is agreeable to the plan of care and goals as stated below:     Medical Necessity is demonstrated by the following  History  Co-morbidities and personal factors that may impact the plan of care [] LOW: no personal factors / co-morbidities  [] MODERATE: 1-2 personal factors / co-morbidities  [x] HIGH: 3+ personal factors / co-morbidities    Moderate / High Support Documentation:  obesity, chronic LBP, knee pain, OA/DJD, chart listed CKD3b, obesity related venous lymphedema of the BLE,     Examination  Body Structures and Functions, activity limitations and participation restrictions that may impact the plan of care [] LOW: addressing 1-2 elements  [] MODERATE: 3+ elements  [x] HIGH: 4+ elements (please support below)    Moderate / High Support Documentation: ROM, pain, swelling, skin discoloration, density, pitting, voiced compression intolerance, limited ability to reach her feet, painful sensitive legs and feet, wc or RW amb      Clinical Presentation [] LOW: stable  [] MODERATE: Evolving  [x] HIGH: Unstable     Decision Making/ Complexity Score: high       Anticipated Barriers for therapy: tolerance, compliance, pain, positions, size/shape for fit     The following goals  were discussed with the patient and patient is in agreement with them as to be addressed in the treatment plan.     Short Term Goals: (6 weeks)  1. Patient will show decreased girth in B LE by up to 2 cm to allow for LE symmetry, shoe and clothing choice, and ability to apply needed compression.  (progressing, not met)   2. Patient will demonstrate 100% knowledge of lymphedema precautions and signs of infection to allow for reduced lymphedema risk, infection risk, and/or exacerbation of condition.  (progressing, not met)  3. Patient or caregiver will perform self-bandaging techniques and/or wearing of compression garments to allow for lymphatic drainage support, skin elasticity, and reduction in shape and size of limb. (progressing, not met)  4. Patient will perform self lymph drainage techniques to areas within reach to enhance lymphatic drainage and skin condition.  (progressing, not met)  5. Patient will tolerate daily activities with multilayered bandaging to allow for lymphatic and venous support.  (progressing, not met)    Long Term Goals: (12  weeks)  1. Patient will show decreased girth in B LE by up to 3 cm  to allow for LE symmetry, shoe and clothing choice, and ability to apply needed compression daily.  (progressing, not met)  2. Patient will show reduction in density to mild or less with improved contour of limb to allow for cosmesis, LE symmetry, infection risk reduction, and clothing and compression choice.   (progressing, not met)  3. Patient to nadine/doff compression garment with daily compliance to assist in lymphedema management, skin elasticity, and tissue density.  (progressing, not met)  4. Pt to show improved postural awareness and alignment.  (progressing, not met)  5. Pt to be I and compliant with HEP to allow for increased function in affected limb.   (progressing, not met)  Plan   Plan of care Certification: 3/20/2024 to 6/12/24.    Outpatient Physical Therapy 2 times weekly for 10 weeks to  include the following interventions: Patient Education, Self Care, Therapeutic Activities, and Therapeutic Exercise. Complete Decongestive Therapy- compression and home equipment needs to be addressed and assisted.    Pt may be seen by a PTA as part of the Rehab treatment team.  Plan of Care was discussed with SUJATA Guidry, PT

## 2024-03-20 NOTE — PROGRESS NOTES
Patient is diagnosed with lymphedema and presents with hyperplasia of lower extremity. Patient has previously attempted elevation and exercise for at least 4 weeks. States she has not been able to tolerate the compression and will be meeting with the lymphedema therapist today hoping to discuss options that she might be able to tolerate.

## 2024-03-25 ENCOUNTER — CLINICAL SUPPORT (OUTPATIENT)
Dept: REHABILITATION | Facility: HOSPITAL | Age: 69
End: 2024-03-25
Payer: MEDICARE

## 2024-03-25 ENCOUNTER — PATIENT MESSAGE (OUTPATIENT)
Dept: CARDIOLOGY | Facility: CLINIC | Age: 69
End: 2024-03-25
Payer: MEDICARE

## 2024-03-25 DIAGNOSIS — I89.0 LYMPHEDEMA OF BOTH LOWER EXTREMITIES: Primary | ICD-10-CM

## 2024-03-25 DIAGNOSIS — I87.2 VENOUS STASIS DERMATITIS OF BOTH LOWER EXTREMITIES: ICD-10-CM

## 2024-03-25 DIAGNOSIS — M79.89 LEG SWELLING: ICD-10-CM

## 2024-03-25 DIAGNOSIS — E66.01 MORBID OBESITY WITH BMI OF 60.0-69.9, ADULT: ICD-10-CM

## 2024-03-25 PROCEDURE — 97140 MANUAL THERAPY 1/> REGIONS: CPT | Mod: CQ

## 2024-03-25 NOTE — PROGRESS NOTES
Physical Therapy Daily Treatment Note     Name: Joy JARRETT [facility] Number: [id number]    Therapy Diagnosis:   Encounter Diagnoses   Name Primary?    Lymphedema of both lower extremities Yes    Leg swelling     Morbid obesity with BMI of 60.0-69.9, adult     Venous stasis dermatitis of both lower extremities      Physician: Andrew Driver MD*    Visit Date: 3/25/2024    Physician Orders: PT Eval and Treat - lymphedema  Medical Diagnosis from Referral:   Diagnosis   I89.0 (ICD-10-CM) - Lymphedema of both lower extremities   Evaluation Date: 3/20/2024  Authorization Period Expiration: 2/19/25  Plan of Care Expiration: 6/12/24  Visit # / Visits authorized: 2/20      Time In: 10:10 am   Time Out: 11:05 am  Total Billable Time: 55 minutes      Precautions: Standard, Fall, and HTN, morbid obesity, CKD 3b - Silicone and tape sensitivity    Subjective     Pt reports: her  has been assisting with application of tubigrip daily which she has been tolerating well and reports skin is less tight. They washed and forgot to bring back today .   She was compliant with home compression/exercise program.  Response to previous treatment: eval - questions tolerance to positioning's during treatment and compression   Functional change: use of WC , ambulates short distances with 2 canes , unable to reach feet .     Pain: 2-5/10 - increased pain in knees with walking , not true pain from swelling   Location: bilateral lower legs     Objective     Female to dept via wc, crocs no compression, able to amb short distances with 2 canes  Amount of Swelling/Location of Swelling: obesity with mod / severe size/shape of lower legs, ankles and feet, fullness of thighs and abdomen of obesity / body habitus  Modified for HOB elevated, knees flexed   Skin Integrity: pink/red discoloration, dry, nodular density, peau d'orange skin changes, dependant rubor   Palpation/Texture: pitting ankles, feet and distal legs, dense, firm    + B  Stemmer Sign  - B Vasu's Sign- legs are tender throughout  Circulation: intact     Treatment:   Joy received the following manual therapy techniques:- Manual Lymphatic Drainage were applied to the: LLE for 55 minutes, including: MLD and short stretch compression bandaging     Pt positioned supine with HOB elevated and therapist left room for one minute to grab bolster for under legs. Upon returning to room pt was in extreme low back pain due to pillow positioning. Sat upright remained of session with LLE propped on therapists knee     MANUAL LYMPHATIC DRAINAGE (MLD):    Positioning as above - seated   stimulation at terminus, along GI region, B inguinal regions - Not Performed today   Drainage of entire L LE adali lower leg, ankle, and foot with return proximally,  Use of Aquaphor/Eucerin due to dryness.   Consider self massage to abdominal areas, B inguinal areas, thigh, and remaining LE within reach.    MULTILAYERED BANDAGING:  issued supplies and bandaged L LE with cotton stockinette, 1 6cm and 1 10cm cellona cotton roll foot to knee, 1-8cm and 2- 10cm Durelast rolls foot to knee, to leave intact 12-24 hrs as tolerated, discontinue with any problems, return rolled bandages next session. Wash and wear schedules confirmed.     Joy received therapeutic exercises to develop strength, endurance, ROM, flexibility, and posture including:  Pt was instructed in and performed Gastroc Soleus Stretching for ankle ROM, muscle pump support and ankle pump mobility.  Pt was given written/illustrated home exercise program to perform daily.  GSS with strap or towel in long sitting - 3 x 20 seconds 1x daily each leg  2.   GSS with strap or towel in chair with cueing for long spine and lean forward - 3 x 20 seconds 1x daily each leg  Aps, knee ROM, avoid dependency, avoid immobility, elevation, walking with compression, deep breathing, use of muscle pump to assist venous return    Home Exercises Provided and Patient Education  Provided:  Self Care Home Management Training/Functional Therapeutic Activity    Continue use of tubigrip G daily , removing before bed with husbands assist donning/doffing  Bandaging to LLE - can apply tubigrip to RLE once home   Bandaging can be worn 12-24 hours . Pt voices unable at night due to frequent restroom trips , advised can remove before bed tonight and to remove if any other pain or adverse effects .   Needs for compression daily for management    Present compression:  tubigrip size G  Orders and recommendations: knee high compression   PATIENT/FAMILY Education: bandaging/compression wear schedule,  HEP,  Beginning of self massage,  Self or assisted bandaging, compression options, and Risk reduction    Written Home Exercises Provided: yes.  Home exercise and compression plan of management was reviewed and Joy was able to demonstrate understanding prior to the end of the session.  Joy demonstrated good  understanding of the education provided.     Assessment     Joy is a 68 y.o. female referred to outpatient Physical Therapy with a medical diagnosis of   Diagnosis   I89.0 (ICD-10-CM) - Lymphedema of both lower extremities   This patient presents s/p obesity, long standing LBP, knee pain, chart listed CKD3b, unable to tolerate positioning for US testing/studies, voiced intolerance to positioning for treatments, compression intolerance, limited ability to reach her feet, painful sensitive legs and feet  resulting in: obesity related venous lymphedema of the BLE, increased pain, increased stiffness in the ankles, legs, back, history of antibiotics for cellulitis, dependant rubor, difficulty with shoes and compression size/fit/tolerance, as well as difficulty performing walking, elevation, compression choices, compression needs, and placing the pt at higher risk of infection.     Initiated treatment to pts LLE today. Unable to tolerate supine due to back pain so MLD performed in seated with LE  propped on therapists knee. Bandaging to LLE today - pt reports will be unable to wear overnight due to urgency. Encouraged needs for daily use of compression. Can continue use of tubigrip daily - needs assistance daily with husbands assist to don/doff.     Severity and chronicity of LE coupled with multiple medical co morbidities including morbid obesity will not allow for successful management of this condition unless other areas are addressed or minimized. Compression options may be limited by size, shape, fit and wear tolerance as well as possible need for custom sizing with related cost considerations. Limited options and expected modifications per tolerance. Therapy may assist with education and training regarding need for compression support. Compliance with purchase of compression needs and compliance with daily use are required.    Joy Is progressing well towards her goals.   Pt prognosis is Fair.     Pt will continue to benefit from skilled outpatient physical therapy to address the deficits listed in the problem list box on initial evaluation, provide pt/family education and to maximize pt's level of independence in the home and community environment.   Pt's spiritual, cultural and educational needs considered and pt agreeable to plan of care and goals.     Anticipated Barriers for therapy: tolerance, compliance, pain, positions, size/shape for fit      The following goals were discussed with the patient and patient is in agreement with them as to be addressed in the treatment plan.      Short Term Goals: (6 weeks)  1. Patient will show decreased girth in B LE by up to 2 cm to allow for LE symmetry, shoe and clothing choice, and ability to apply needed compression.  (progressing, not met)   2. Patient will demonstrate 100% knowledge of lymphedema precautions and signs of infection to allow for reduced lymphedema risk, infection risk, and/or exacerbation of condition.  (progressing, not met)  3. Patient  or caregiver will perform self-bandaging techniques and/or wearing of compression garments to allow for lymphatic drainage support, skin elasticity, and reduction in shape and size of limb. (progressing, not met)  4. Patient will perform self lymph drainage techniques to areas within reach to enhance lymphatic drainage and skin condition.  (progressing, not met)  5. Patient will tolerate daily activities with multilayered bandaging to allow for lymphatic and venous support.  (progressing, not met)     Long Term Goals: (12  weeks)  1. Patient will show decreased girth in B LE by up to 3 cm  to allow for LE symmetry, shoe and clothing choice, and ability to apply needed compression daily.  (progressing, not met)  2. Patient will show reduction in density to mild or less with improved contour of limb to allow for cosmesis, LE symmetry, infection risk reduction, and clothing and compression choice.   (progressing, not met)  3. Patient to nadine/doff compression garment with daily compliance to assist in lymphedema management, skin elasticity, and tissue density.  (progressing, not met)  4. Pt to show improved postural awareness and alignment.  (progressing, not met)  5. Pt to be I and compliant with HEP to allow for increased function in affected limb.   (progressing, not met)  Plan   Plan of care Certification: 3/20/2024 to 6/12/24.     Outpatient Physical Therapy 2 times weekly for 10 weeks to include the following interventions: Patient Education, Self Care, Therapeutic Activities, and Therapeutic Exercise. Complete Decongestive Therapy- compression and home equipment needs to be addressed and assisted.    Dara Sanchez, PTA

## 2024-03-26 ENCOUNTER — PATIENT MESSAGE (OUTPATIENT)
Dept: INTERNAL MEDICINE | Facility: CLINIC | Age: 69
End: 2024-03-26
Payer: MEDICARE

## 2024-03-27 ENCOUNTER — PATIENT MESSAGE (OUTPATIENT)
Dept: CARDIOLOGY | Facility: CLINIC | Age: 69
End: 2024-03-27
Payer: MEDICARE

## 2024-03-27 VITALS — DIASTOLIC BLOOD PRESSURE: 95 MMHG | SYSTOLIC BLOOD PRESSURE: 142 MMHG

## 2024-03-27 DIAGNOSIS — E66.01 MORBID OBESITY: Primary | ICD-10-CM

## 2024-03-27 DIAGNOSIS — I10 ESSENTIAL HYPERTENSION: Primary | ICD-10-CM

## 2024-03-27 RX ORDER — LOSARTAN POTASSIUM 50 MG/1
50 TABLET ORAL DAILY
Qty: 30 TABLET | Refills: 11 | Status: SHIPPED | OUTPATIENT
Start: 2024-03-27 | End: 2024-04-29

## 2024-03-27 NOTE — TELEPHONE ENCOUNTER
Let's begin losartan 50 mg a day. She should take 1/2 a tab for the first 2 days then a full 50 mg a day. She should start the first dose tonight. BPs and BMP in 1 mo.

## 2024-03-27 NOTE — TELEPHONE ENCOUNTER
BP readings are overall too high. But I see that the dose of losartan was recently adjusted. Will monitor.

## 2024-04-01 ENCOUNTER — CLINICAL SUPPORT (OUTPATIENT)
Dept: REHABILITATION | Facility: HOSPITAL | Age: 69
End: 2024-04-01
Payer: MEDICARE

## 2024-04-01 DIAGNOSIS — M79.89 LEG SWELLING: ICD-10-CM

## 2024-04-01 DIAGNOSIS — I89.0 LYMPHEDEMA OF BOTH LOWER EXTREMITIES: Primary | ICD-10-CM

## 2024-04-01 DIAGNOSIS — E66.01 MORBID OBESITY WITH BMI OF 60.0-69.9, ADULT: ICD-10-CM

## 2024-04-01 DIAGNOSIS — I87.2 VENOUS STASIS DERMATITIS OF BOTH LOWER EXTREMITIES: ICD-10-CM

## 2024-04-01 PROCEDURE — 97140 MANUAL THERAPY 1/> REGIONS: CPT | Mod: CQ

## 2024-04-01 PROCEDURE — 97535 SELF CARE MNGMENT TRAINING: CPT | Mod: CQ

## 2024-04-01 NOTE — PROGRESS NOTES
Physical Therapy Daily Treatment Note     Name: Joy JARRETT [facility] Number: [id number]    Therapy Diagnosis:   Encounter Diagnoses   Name Primary?    Lymphedema of both lower extremities Yes    Leg swelling     Morbid obesity with BMI of 60.0-69.9, adult     Venous stasis dermatitis of both lower extremities        Physician: Andrew Driver MD*    Visit Date: 4/1/2024    Physician Orders: PT Eval and Treat - lymphedema  Medical Diagnosis from Referral:   Diagnosis   I89.0 (ICD-10-CM) - Lymphedema of both lower extremities   Evaluation Date: 3/20/2024  Authorization Period Expiration: 2/19/25  Plan of Care Expiration: 6/12/24  Visit # / Visits authorized: 3/20      Time In: 12:30 pm  Time Out: 1:25 pm  Total Billable Time: 55 minutes      Precautions: Standard, Fall, and HTN, morbid obesity, CKD 3b - Silicone and tape sensitivity    Subjective     Pt reports: she admits has not been doing as much as she should have exercise wise due to being busy with easter and cooking . She has been wearing the tubigrip every day. Her leg looked skinny after removing the bandages .   She was compliant with home compression/exercise program.  Response to previous treatment: good response to bandaging and compliance with use of tubirgip daily.   Functional change: use of WC , ambulates short distances with 2 canes , unable to reach feet .     Pain: 2-5/10 - increased pain in knees with walking , not true pain from swelling   Location: bilateral lower legs     Objective     Female to dept via wc, crocs tubigrip donned BLEs, able to amb short distances with 2 canes  Amount of Swelling/Location of Swelling: obesity with mod / severe size/shape of lower legs, ankles and feet, fullness of thighs and abdomen of obesity / body habitus  Modified for HOB elevated, knees flexed   Skin Integrity: pink/red discoloration, dry, nodular density, peau d'orange skin changes, dependant rubor   Palpation/Texture: pitting ankles, feet and  distal legs, dense, firm    + B Stemmer Sign  - B Vasu's Sign- legs are tender throughout  Circulation: intact     Treatment:   Joy received the following manual therapy techniques:- Manual Lymphatic Drainage were applied to the: RLE for 45 minutes, including: MLD and short stretch compression bandaging     Attempted supine position with HOB elevated and LE elevated - unable to tolerate .   Pt positioned seated on mat with LE propped on therapist knee    MANUAL LYMPHATIC DRAINAGE (MLD):    Positioning as above - seated   stimulation at terminus, along GI region, B inguinal regions - Not Performed today   Drainage of entire RLE adali lower leg, ankle, and foot with return proximally,  Use of Aquaphor/Eucerin due to dryness.   Consider self massage to abdominal areas, B inguinal areas, thigh, and remaining LE within reach.    MULTILAYERED BANDAGING:  issued supplies and bandaged R LE with cotton stockinette, 1 6cm and 1 10cm cellona cotton roll foot to knee, 1-8cm and 2- 10cm Durelast rolls foot to knee, to leave intact 12-24 hrs as tolerated, discontinue with any problems, return rolled bandages next session. Wash and wear schedules confirmed.   Tubirgip remained to LLE    Joy received therapeutic exercises to develop strength, endurance, ROM, flexibility, and posture including:  Pt was instructed in and performed Gastroc Soleus Stretching for ankle ROM, muscle pump support and ankle pump mobility.  Pt was given written/illustrated home exercise program to perform daily.  GSS with strap or towel in long sitting - 3 x 20 seconds 1x daily each leg  2.   GSS with strap or towel in chair with cueing for long spine and lean forward - 3 x 20 seconds 1x daily each leg  Aps, knee ROM, avoid dependency, avoid immobility, elevation, walking with compression, deep breathing, use of muscle pump to assist venous return    Home Exercises Provided and Patient Education Provided:  Self Care Home Management Training/Functional  Therapeutic Activity for 10 minutes :     Discussed stronger compression options as currently using tubigrip   Sensitivities and may be limited with stronger options and assistance with donning/doffing   Discussed 15-20mmHg knee high socks wide calf .   Assisted with measurements and options provided on [employer] .   Provided measurements and can self purchase .     Continue use of tubigrip G daily , removing before bed with husbands assist donning/doffing  Bandaging to LLE - can apply tubigrip to RLE once home   Bandaging can be worn 12-24 hours . Pt voices unable at night due to frequent restroom trips , advised can remove before bed tonight and to remove if any other pain or adverse effects .   Needs for compression daily for management    Present compression:  tubigrip size G  Orders and recommendations: knee high compression   PATIENT/FAMILY Education: bandaging/compression wear schedule,  HEP,  Beginning of self massage,  Self or assisted bandaging, compression options, and Risk reduction    Written Home Exercises Provided: yes.  Home exercise and compression plan of management was reviewed and Joy was able to demonstrate understanding prior to the end of the session.  Joy demonstrated good  understanding of the education provided.     Assessment     Joy is a 68 y.o. female referred to outpatient Physical Therapy with a medical diagnosis of   Diagnosis   I89.0 (ICD-10-CM) - Lymphedema of both lower extremities   This patient presents s/p obesity, long standing LBP, knee pain, chart listed CKD3b, unable to tolerate positioning for US testing/studies, voiced intolerance to positioning for treatments, compression intolerance, limited ability to reach her feet, painful sensitive legs and feet  resulting in: obesity related venous lymphedema of the BLE, increased pain, increased stiffness in the ankles, legs, back, history of antibiotics for cellulitis, dependant rubor, difficulty with shoes and compression  size/fit/tolerance, as well as difficulty performing walking, elevation, compression choices, compression needs, and placing the pt at higher risk of infection.     Pt responded well to bandaging and began alternating LEs today . Still unable to tolerate supine positioning limiting ability to perform full MLD. Time spent discussing needs for stronger compression options. Discussed knee high wide calf plus size socks on [employer] . May need 15-20 VS 20-30 as she has a poor tolerance to compression. Currently tolerating tubigrip well and will attempt to try and find stronger options she can tolerate- needs assistance daily with husbands assist to don/doff.     Severity and chronicity of LE coupled with multiple medical co morbidities including morbid obesity will not allow for successful management of this condition unless other areas are addressed or minimized. Compression options may be limited by size, shape, fit and wear tolerance as well as possible need for custom sizing with related cost considerations. Limited options and expected modifications per tolerance. Therapy may assist with education and training regarding need for compression support. Compliance with purchase of compression needs and compliance with daily use are required.    Joy Is progressing well towards her goals.   Pt prognosis is Fair.     Pt will continue to benefit from skilled outpatient physical therapy to address the deficits listed in the problem list box on initial evaluation, provide pt/family education and to maximize pt's level of independence in the home and community environment.   Pt's spiritual, cultural and educational needs considered and pt agreeable to plan of care and goals.     Anticipated Barriers for therapy: tolerance, compliance, pain, positions, size/shape for fit      The following goals were discussed with the patient and patient is in agreement with them as to be addressed in the treatment plan.      Short Term  Goals: (6 weeks)  1. Patient will show decreased girth in B LE by up to 2 cm to allow for LE symmetry, shoe and clothing choice, and ability to apply needed compression.  (progressing, not met)   2. Patient will demonstrate 100% knowledge of lymphedema precautions and signs of infection to allow for reduced lymphedema risk, infection risk, and/or exacerbation of condition.  (progressing, not met)  3. Patient or caregiver will perform self-bandaging techniques and/or wearing of compression garments to allow for lymphatic drainage support, skin elasticity, and reduction in shape and size of limb. (progressing, not met)  4. Patient will perform self lymph drainage techniques to areas within reach to enhance lymphatic drainage and skin condition.  (progressing, not met)  5. Patient will tolerate daily activities with multilayered bandaging to allow for lymphatic and venous support.  (progressing, not met)     Long Term Goals: (12  weeks)  1. Patient will show decreased girth in B LE by up to 3 cm  to allow for LE symmetry, shoe and clothing choice, and ability to apply needed compression daily.  (progressing, not met)  2. Patient will show reduction in density to mild or less with improved contour of limb to allow for cosmesis, LE symmetry, infection risk reduction, and clothing and compression choice.   (progressing, not met)  3. Patient to nadine/doff compression garment with daily compliance to assist in lymphedema management, skin elasticity, and tissue density.  (progressing, not met)  4. Pt to show improved postural awareness and alignment.  (progressing, not met)  5. Pt to be I and compliant with HEP to allow for increased function in affected limb.   (progressing, not met)  Plan   Plan of care Certification: 3/20/2024 to 6/12/24.     Outpatient Physical Therapy 2 times weekly for 10 weeks to include the following interventions: Patient Education, Self Care, Therapeutic Activities, and Therapeutic Exercise.  Complete Decongestive Therapy- compression and home equipment needs to be addressed and assisted.    Dara Sanchez, PTA

## 2024-04-04 ENCOUNTER — PATIENT OUTREACH (OUTPATIENT)
Dept: ADMINISTRATIVE | Facility: HOSPITAL | Age: 69
End: 2024-04-04
Payer: MEDICARE

## 2024-04-04 ENCOUNTER — PATIENT MESSAGE (OUTPATIENT)
Dept: ADMINISTRATIVE | Facility: HOSPITAL | Age: 69
End: 2024-04-04
Payer: MEDICARE

## 2024-04-04 NOTE — PROGRESS NOTES
Physical Therapy Daily Treatment Note     Name: Joy AJRRETT [facility] Number: [id number]    Therapy Diagnosis:   Encounter Diagnoses   Name Primary?    Lymphedema of both lower extremities Yes    Leg swelling     Morbid obesity with BMI of 60.0-69.9, adult     Venous stasis dermatitis of both lower extremities          Physician: Andrew Driver MD*    Visit Date: 4/5/2024    Physician Orders: PT Eval and Treat - lymphedema  Medical Diagnosis from Referral:   Diagnosis   I89.0 (ICD-10-CM) - Lymphedema of both lower extremities   Evaluation Date: 3/20/2024  Authorization Period Expiration: 2/19/25  Plan of Care Expiration: 6/12/24  Visit # / Visits authorized: 4/20      Time In: 11:10 am   Time Out: 11:55 am  Total Billable Time: 45 minutes      Precautions: Standard, Fall, and HTN, morbid obesity, CKD 3b - Silicone and tape sensitivity    Subjective     Pt reports: the bandaging has been helping the swelling. She is busy with her granddaughter being over so she has not had the chance to look into ordering the compression socks . Her  assists with application of tubigrip daily .   She was compliant with home compression/exercise program.  Response to previous treatment: good response to bandaging and compliance with use of tubirgip daily.   Functional change: use of WC , ambulates short distances with 2 canes , unable to reach feet .     Pain: 2-5/10 - increased pain in knees with walking , not true pain from swelling   Location: bilateral lower legs     Objective     Female to dept via wc, crocs tubigrip donned BLEs, able to amb short distances with 2 canes  Amount of Swelling/Location of Swelling: obesity with mod / severe size/shape of lower legs, ankles and feet, fullness of thighs and abdomen of obesity / body habitus  Modified for HOB elevated, knees flexed   Skin Integrity: pink/red discoloration, dry, nodular density, peau d'orange skin changes, dependant rubor   Palpation/Texture: pitting  ankles, feet and distal legs, dense, firm    + B Stemmer Sign  - B Vasu's Sign- legs are tender throughout  Circulation: intact     Treatment:   Joy received the following manual therapy techniques:- Manual Lymphatic Drainage were applied to the: RLE for 45 minutes, including: MLD and short stretch compression bandaging     Attempted supine position with HOB elevated and LE elevated - unable to tolerate .   Pt positioned seated on mat with LE propped on therapist knee    MANUAL LYMPHATIC DRAINAGE (MLD):    Positioning as above - seated   stimulation at terminus, along GI region, B inguinal regions - Not Performed today   Drainage of entire RLE adali lower leg, ankle, and foot with return proximally,  Use of Aquaphor/Eucerin due to dryness.   Consider self massage to abdominal areas, B inguinal areas, thigh, and remaining LE within reach.    MULTILAYERED BANDAGING:  issued supplies and bandaged R LE with cotton stockinette, 1 6cm and 1 10cm cellona cotton roll foot to knee, 1-8cm and 2- 10cm Durelast rolls foot to knee, to leave intact 12-24 hrs as tolerated, discontinue with any problems, return rolled bandages next session. Wash and wear schedules confirmed.   Tubirgip remained to LLE    Joy received therapeutic exercises to develop strength, endurance, ROM, flexibility, and posture including:  Pt was instructed in and performed Gastroc Soleus Stretching for ankle ROM, muscle pump support and ankle pump mobility.  Pt was given written/illustrated home exercise program to perform daily.  GSS with strap or towel in long sitting - 3 x 20 seconds 1x daily each leg  2.   GSS with strap or towel in chair with cueing for long spine and lean forward - 3 x 20 seconds 1x daily each leg  Aps, knee ROM, avoid dependency, avoid immobility, elevation, walking with compression, deep breathing, use of muscle pump to assist venous return    Home Exercises Provided and Patient Education Provided:  Self Care Home Management  Training/Functional Therapeutic Activity     4/1/24:  Discussed stronger compression options as currently using tubigrip   Sensitivities and may be limited with stronger options and assistance with donning/doffing   Discussed 15-20mmHg knee high socks wide calf .   Assisted with measurements and options provided on [employer] .   Provided measurements and can self purchase .     Continue use of tubigrip G daily , removing before bed with husbands assist donning/doffing  Bandaging to LLE - can apply tubigrip to RLE once home   Bandaging can be worn 12-24 hours . Pt voices unable at night due to frequent restroom trips , advised can remove before bed tonight and to remove if any other pain or adverse effects .   Needs for compression daily for management    Present compression:  tubigrip size G  Orders and recommendations: knee high compression   PATIENT/FAMILY Education: bandaging/compression wear schedule,  HEP,  Beginning of self massage,  Self or assisted bandaging, compression options, and Risk reduction    Written Home Exercises Provided: yes.  Home exercise and compression plan of management was reviewed and Joy was able to demonstrate understanding prior to the end of the session.  oJy demonstrated good  understanding of the education provided.     Assessment     Joy is a 68 y.o. female referred to outpatient Physical Therapy with a medical diagnosis of   Diagnosis   I89.0 (ICD-10-CM) - Lymphedema of both lower extremities   This patient presents s/p obesity, long standing LBP, knee pain, chart listed CKD3b, unable to tolerate positioning for US testing/studies, voiced intolerance to positioning for treatments, compression intolerance, limited ability to reach her feet, painful sensitive legs and feet  resulting in: obesity related venous lymphedema of the BLE, increased pain, increased stiffness in the ankles, legs, back, history of antibiotics for cellulitis, dependant rubor, difficulty with shoes and  compression size/fit/tolerance, as well as difficulty performing walking, elevation, compression choices, compression needs, and placing the pt at higher risk of infection.     Continuing to progress with CDT alternating LEs at each session. Still unable to tolerate supine positioning limiting ability to perform full MLD. Encouraged to purchase stronger compression options when able - was measured and provided options at last session.  Discussed knee high wide calf plus size socks on [employer] . May need 15-20 VS 20-30 as she has a poor tolerance to compression. Currently tolerating tubigrip well and will attempt to try and find stronger options she can tolerate- needs assistance daily with husbands assist to don/doff.     Severity and chronicity of LE coupled with multiple medical co morbidities including morbid obesity will not allow for successful management of this condition unless other areas are addressed or minimized. Compression options may be limited by size, shape, fit and wear tolerance as well as possible need for custom sizing with related cost considerations. Limited options and expected modifications per tolerance. Therapy may assist with education and training regarding need for compression support. Compliance with purchase of compression needs and compliance with daily use are required.    Joy Is progressing well towards her goals.   Pt prognosis is Fair.     Pt will continue to benefit from skilled outpatient physical therapy to address the deficits listed in the problem list box on initial evaluation, provide pt/family education and to maximize pt's level of independence in the home and community environment.   Pt's spiritual, cultural and educational needs considered and pt agreeable to plan of care and goals.     Anticipated Barriers for therapy: tolerance, compliance, pain, positions, size/shape for fit      The following goals were discussed with the patient and patient is in agreement with  them as to be addressed in the treatment plan.      Short Term Goals: (6 weeks)  1. Patient will show decreased girth in B LE by up to 2 cm to allow for LE symmetry, shoe and clothing choice, and ability to apply needed compression.  (progressing, not met)   2. Patient will demonstrate 100% knowledge of lymphedema precautions and signs of infection to allow for reduced lymphedema risk, infection risk, and/or exacerbation of condition.  (progressing, not met)  3. Patient or caregiver will perform self-bandaging techniques and/or wearing of compression garments to allow for lymphatic drainage support, skin elasticity, and reduction in shape and size of limb. (progressing, not met)  4. Patient will perform self lymph drainage techniques to areas within reach to enhance lymphatic drainage and skin condition.  (progressing, not met)  5. Patient will tolerate daily activities with multilayered bandaging to allow for lymphatic and venous support.  (progressing, not met)     Long Term Goals: (12  weeks)  1. Patient will show decreased girth in B LE by up to 3 cm  to allow for LE symmetry, shoe and clothing choice, and ability to apply needed compression daily.  (progressing, not met)  2. Patient will show reduction in density to mild or less with improved contour of limb to allow for cosmesis, LE symmetry, infection risk reduction, and clothing and compression choice.   (progressing, not met)  3. Patient to nadine/doff compression garment with daily compliance to assist in lymphedema management, skin elasticity, and tissue density.  (progressing, not met)  4. Pt to show improved postural awareness and alignment.  (progressing, not met)  5. Pt to be I and compliant with HEP to allow for increased function in affected limb.   (progressing, not met)  Plan   Plan of care Certification: 3/20/2024 to 6/12/24.     Outpatient Physical Therapy 2 times weekly for 10 weeks to include the following interventions: Patient Education, Self  Care, Therapeutic Activities, and Therapeutic Exercise. Complete Decongestive Therapy- compression and home equipment needs to be addressed and assisted.    Dara Sanchez, PTA

## 2024-04-05 ENCOUNTER — CLINICAL SUPPORT (OUTPATIENT)
Dept: REHABILITATION | Facility: HOSPITAL | Age: 69
End: 2024-04-05
Payer: MEDICARE

## 2024-04-05 DIAGNOSIS — E66.01 MORBID OBESITY WITH BMI OF 60.0-69.9, ADULT: ICD-10-CM

## 2024-04-05 DIAGNOSIS — I89.0 LYMPHEDEMA OF BOTH LOWER EXTREMITIES: Primary | ICD-10-CM

## 2024-04-05 DIAGNOSIS — M79.89 LEG SWELLING: ICD-10-CM

## 2024-04-05 DIAGNOSIS — I87.2 VENOUS STASIS DERMATITIS OF BOTH LOWER EXTREMITIES: ICD-10-CM

## 2024-04-05 PROCEDURE — 97140 MANUAL THERAPY 1/> REGIONS: CPT | Mod: CQ

## 2024-04-07 ENCOUNTER — PATIENT MESSAGE (OUTPATIENT)
Dept: CARDIOLOGY | Facility: CLINIC | Age: 69
End: 2024-04-07
Payer: MEDICARE

## 2024-04-07 ENCOUNTER — PATIENT MESSAGE (OUTPATIENT)
Dept: INTERNAL MEDICINE | Facility: CLINIC | Age: 69
End: 2024-04-07
Payer: MEDICARE

## 2024-04-08 ENCOUNTER — CLINICAL SUPPORT (OUTPATIENT)
Dept: REHABILITATION | Facility: HOSPITAL | Age: 69
End: 2024-04-08
Payer: MEDICARE

## 2024-04-08 VITALS — DIASTOLIC BLOOD PRESSURE: 77 MMHG | SYSTOLIC BLOOD PRESSURE: 137 MMHG

## 2024-04-08 DIAGNOSIS — I89.0 LYMPHEDEMA OF BOTH LOWER EXTREMITIES: Primary | ICD-10-CM

## 2024-04-08 DIAGNOSIS — I87.2 VENOUS STASIS DERMATITIS OF BOTH LOWER EXTREMITIES: ICD-10-CM

## 2024-04-08 DIAGNOSIS — E66.01 MORBID OBESITY WITH BMI OF 60.0-69.9, ADULT: ICD-10-CM

## 2024-04-08 DIAGNOSIS — M79.89 LEG SWELLING: ICD-10-CM

## 2024-04-08 PROCEDURE — 97535 SELF CARE MNGMENT TRAINING: CPT

## 2024-04-08 PROCEDURE — 97140 MANUAL THERAPY 1/> REGIONS: CPT

## 2024-04-08 NOTE — PROGRESS NOTES
Physical Therapy Daily Treatment Note     Name: Joy JARRETT [facility] Number: [id number]    Therapy Diagnosis:   Encounter Diagnoses   Name Primary?    Lymphedema of both lower extremities Yes    Leg swelling     Morbid obesity with BMI of 60.0-69.9, adult     Venous stasis dermatitis of both lower extremities      Physician: Andrwe Driver MD*    Visit Date: 4/8/2024    Physician Orders: PT Eval and Treat - lymphedema  Medical Diagnosis from Referral:   Diagnosis   I89.0 (ICD-10-CM) - Lymphedema of both lower extremities   Evaluation Date: 3/20/2024  Authorization Period Expiration: 2/19/25  Plan of Care Expiration: 6/12/24  Visit # / Visits authorized: 5/20      Time In: 1005am   Time Out: 1105am  Total Billable Time: 60minutes      Precautions: Standard, Fall, and HTN, morbid obesity, CKD 3b - Silicone and tape sensitivity    Subjective     Pt reports: modified positioning due to back pain - unable to lie down- even propped is uncomfortable.   She admits bathroom issues, frequency, accidents - also limit her use of bandaging at night.   She was compliant with home compression/exercise program.  Response to previous treatment: attempting Tubigrip G daily -  must assist but he also has health issues.  Pt requests Tubigrip F and ordering information was provided. Pt feels compression does help.    Functional change: chronic back pain limits her positions, postures and mobility.  Pt does walk short distances with 2 canes.    Pt is unable to tolerated lying propped supine on treatment table.     Pain: 2-5/10 - increased pain in knees with walking , not true pain from swelling   Location: bilateral lower legs     Objective     Female to dept via wc- total assistance for wc, crocs, Tubigrip G- slipping up her feet B LEs  Amount of Swelling/Location of Swelling: obesity with mod / severe size/shape of lower legs, ankles and feet, fullness of thighs and abdomen of obesity / body habitus  Modified for HOB  elevated, knees flexed   Skin Integrity: pink/red discoloration, dry, nodular density, peau d'orange skin changes, dependant rubor   Palpation/Texture: pitting ankles, feet and distal legs, dense, firm    + B Stemmer Sign  - B Vasu's Sign- legs are tender throughout  Circulation: intact     Treatment:     Wc assistance from waiting area  Pt amb with 2 canes for restroom prior to session  Total assist wc back to waiting area -  will help to car    Joy received the following manual therapy techniques:- Manual Lymphatic Drainage were applied to the: R LE for 45 minutes, including: MLD and short stretch compression bandaging     Pt declines table in propped supine  Elevated table- remained sitting with leg resting on chair for treatment session.    MANUAL LYMPHATIC DRAINAGE (MLD):    Positioning as above - seated   Modified attempts to R GI and B inguinal regions - limited due to positioning  Drainage of entire RLE adali lower leg, ankle, and foot with return proximally,  Use of Aquaphor/Eucerin due to dryness.   Consider self massage to abdominal areas, B inguinal areas, thigh, and remaining LE within reach.    MULTILAYERED BANDAGING:  issued supplies and bandaged R LE with cotton stockinette, 1 6cm and 1 10cm cellona cotton roll foot to knee, 1-8cm and 2- 10cm Durelast rolls foot to knee, to leave intact 12-24 hrs as tolerated, discontinue with any problems, return rolled bandages next session. Wash and wear schedules confirmed.   Issued new segments of Tubigrip F and applied to L LE - don/doff training and reviewed position fit and goals of compression  May use R and L LE when not in bandaging.     Joy received therapeutic exercises to develop strength, endurance, ROM, flexibility, and posture including:  Encouraged aps, change in position to avoid sitting with legs down for long periods  Walking with device as tolerated  Elevation for periods as allowed   Prior GSS training - has written/illustrated home  exercise program to perform daily.  GSS with strap or towel in long sitting - 3 x 20 seconds 1x daily each leg  2.   GSS with strap or towel in chair with cueing for long spine and lean forward - 3 x 20 seconds 1x daily each leg  Aps, knee ROM, avoid dependency, avoid immobility, elevation, walking with compression, deep breathing, use of muscle pump to assist venous return    Home Exercises Provided and Patient Education Provided:  Self Care Home Management Training/Functional Therapeutic Activity x 15 min    4/1/24:  Discussed 15-20mmHg knee high socks wide calf .   Assisted with measurements and options provided on [employer] .   Provided measurements and can self purchase .     Issued Tubigrip F and has G segments - full review of lessor tier options in class of compression, size/shape/style to allow a form of compliance with don/doff and wear tolerance.   Pt was provided with an option for tubigrip F or G, also PTA provided option for Knee High Compression socks wide calf 15-20mmHg.  Pros/cons of basic compression choices were fully reviewed with noted limited size selection, length choice, lessor tier of product, benefits of low cost, relative ease of application and comfort, and additional items available for use.  Pt was trained on don/doff and methods to apply with assistance of gloves or family member.  Position and fit with appropriate girth, length and support are required.   Daytime use with removal for sleep.   Wear schedules and wash schedules confirmed.   Provided information to secure additional pairs or use as temporary option until medical grade choices are available.     Present compression:  tubigrip size G and F  Orders and recommendations: knee high compression   PATIENT/FAMILY Education: bandaging/compression wear schedule,  HEP,  Beginning of self massage,  Self or assisted bandaging, compression options, and Risk reduction    Written Home Exercises Provided: yes.  Home exercise and compression  plan of management was reviewed and Joy was able to demonstrate understanding prior to the end of the session.  Joy demonstrated good  understanding of the education provided.     Assessment     Joy is a 68 y.o. female referred to outpatient Physical Therapy with a medical diagnosis of   Diagnosis   I89.0 (ICD-10-CM) - Lymphedema of both lower extremities   This patient presents s/p obesity, long standing LBP, knee pain, chart listed CKD3b, unable to tolerate positioning for US testing/studies, voiced intolerance to positioning for treatments, compression intolerance, limited ability to reach her feet, painful sensitive legs and feet  resulting in: obesity related venous lymphedema of the BLE, increased pain, increased stiffness in the ankles, legs, back, history of antibiotics for cellulitis, dependant rubor, difficulty with shoes and compression size/fit/tolerance, as well as difficulty performing walking, elevation, compression choices, compression needs, and placing the pt at higher risk of infection.     Back Pain and incontinence / frequency issues limit mobility, don/doff of compression, tolerance to therapy and compression recommendations.  Obesity and chronicity contributing to shape/size of legs complicates management. Due to noted obstacles, therapy is recommending lessor tiers of compression to gain a form of daily compliance.   Joy Is progressing well towards her goals.   Pt prognosis is Fair.     Pt will continue to benefit from skilled outpatient physical therapy to address the deficits listed in the problem list box on initial evaluation, provide pt/family education and to maximize pt's level of independence in the home and community environment.   Pt's spiritual, cultural and educational needs considered and pt agreeable to plan of care and goals.     Anticipated Barriers for therapy: tolerance, compliance, pain, positions, size/shape for fit      The following goals were discussed with  the patient and patient is in agreement with them as to be addressed in the treatment plan.      Short Term Goals: (6 weeks)  1. Patient will show decreased girth in B LE by up to 2 cm to allow for LE symmetry, shoe and clothing choice, and ability to apply needed compression.  (progressing, not met)   2. Patient will demonstrate 100% knowledge of lymphedema precautions and signs of infection to allow for reduced lymphedema risk, infection risk, and/or exacerbation of condition.  (progressing, not met)  3. Patient or caregiver will perform self-bandaging techniques and/or wearing of compression garments to allow for lymphatic drainage support, skin elasticity, and reduction in shape and size of limb. (progressing, not met)  4. Patient will perform self lymph drainage techniques to areas within reach to enhance lymphatic drainage and skin condition.  (progressing, not met)  5. Patient will tolerate daily activities with multilayered bandaging to allow for lymphatic and venous support.  (progressing, not met)     Long Term Goals: (12  weeks)  1. Patient will show decreased girth in B LE by up to 3 cm  to allow for LE symmetry, shoe and clothing choice, and ability to apply needed compression daily.  (progressing, not met)  2. Patient will show reduction in density to mild or less with improved contour of limb to allow for cosmesis, LE symmetry, infection risk reduction, and clothing and compression choice.   (progressing, not met)  3. Patient to nadine/doff compression garment with daily compliance to assist in lymphedema management, skin elasticity, and tissue density.  (progressing, not met)  4. Pt to show improved postural awareness and alignment.  (progressing, not met)  5. Pt to be I and compliant with HEP to allow for increased function in affected limb.   (progressing, not met)  Plan   Plan of care Certification: 3/20/2024 to 6/12/24.     Outpatient Physical Therapy 2 times weekly for 10 weeks to include the  following interventions: Patient Education, Self Care, Therapeutic Activities, and Therapeutic Exercise. Complete Decongestive Therapy- compression and home equipment needs to be addressed and assisted.    POC confirmed with Dara Sanchez, SUJATA Quintero, PT

## 2024-04-15 ENCOUNTER — TELEPHONE (OUTPATIENT)
Dept: CARDIOLOGY | Facility: CLINIC | Age: 69
End: 2024-04-15
Payer: MEDICARE

## 2024-04-15 ENCOUNTER — CLINICAL SUPPORT (OUTPATIENT)
Dept: REHABILITATION | Facility: HOSPITAL | Age: 69
End: 2024-04-15
Payer: MEDICARE

## 2024-04-15 DIAGNOSIS — I89.0 LYMPHEDEMA OF BOTH LOWER EXTREMITIES: Primary | ICD-10-CM

## 2024-04-15 DIAGNOSIS — E66.01 MORBID OBESITY WITH BMI OF 60.0-69.9, ADULT: ICD-10-CM

## 2024-04-15 DIAGNOSIS — I87.2 VENOUS STASIS DERMATITIS OF BOTH LOWER EXTREMITIES: ICD-10-CM

## 2024-04-15 DIAGNOSIS — M79.89 LEG SWELLING: ICD-10-CM

## 2024-04-15 PROCEDURE — 97535 SELF CARE MNGMENT TRAINING: CPT

## 2024-04-15 PROCEDURE — 97140 MANUAL THERAPY 1/> REGIONS: CPT

## 2024-04-15 NOTE — PROGRESS NOTES
Physical Therapy Daily Treatment Note     Name: Joy JARRETT [facility] Number: [id number]    Therapy Diagnosis:   Encounter Diagnoses   Name Primary?    Lymphedema of both lower extremities Yes    Leg swelling     Morbid obesity with BMI of 60.0-69.9, adult     Venous stasis dermatitis of both lower extremities      Physician: Andrew Driver MD*    Visit Date: 4/15/2024    Physician Orders: PT Eval and Treat - lymphedema  Medical Diagnosis from Referral:   Diagnosis   I89.0 (ICD-10-CM) - Lymphedema of both lower extremities   Evaluation Date: 3/20/2024  Authorization Period Expiration: 2/19/25  Plan of Care Expiration: 6/12/24  Visit # / Visits authorized: 6/20      Time In: 1115am   Time Out: 1215pm  Total Billable Time: 60minutes      Precautions: Standard, Fall, and HTN, morbid obesity, CKD 3b - Silicone and tape sensitivity    Subjective     Pt reports: declines lying on treatment table due to tolerance with back pain.    Bathroom frequency - visit prior to session.   She was compliant with home compression/exercise program.  Response to previous treatment: pt ordered 3x-4x wide calf 15-20mmHg- both were too small. Feels Tubigrip F was also small and caused bulge at ankle, resume use of Tubigrip G daily -  must assist.  Pt will consider sizing, self order and may need to order Tubigrip G box.   Functional change: same, chronic back pain limits her positions, postures and mobility.   Walks short distances with 2 canes.    Pt is unable to tolerated lying propped supine on treatment table.     Pain: 2-5/10 - increased pain in knees with walking , no true pain from swelling   Location: bilateral lower legs     Objective     Female to dept via wc- total assistance for wc,   Tubigrip G- slipping up her feet B LE, frayed, socks and Crocs  Amount of Swelling/Location of Swelling: obesity with mod / severe size/shape of lower legs, ankles and feet, fullness of thighs and abdomen of obesity / body  habitus  Modified for HOB elevated, knees flexed   Skin Integrity: pink/red discoloration, dry, nodular density, peau d'orange skin changes, dependant rubor   Palpation/Texture: pitting ankles, feet and distal legs, dense, firm    + B Stemmer Sign  - B Vasu's Sign- legs are tender throughout  Circulation: intact   Girth Measurements (in centimeters)   Sitting, declines lying down,  leg on chair seat  LANDMARK LEFT LE  3/20/24 L LE  4/15/24 Change  L RIGHT LE  3/20/24 DIFF   at eval   SBP 80.0 cm 80.0 0 77.0 cm 3.0 cm   10 below SBP 59.0 cm 59.0 0 56.0 cm 3.0 cm   20 below SBP 58.0 cm 56.5 1.5 59.0 cm 1.0 cm   30 below SBP 46.0 cm 46.0 0 46.0 cm 0 cm   35 below SBP 38.0 cm 38.5 0.5 38.5 cm 0.5 cm   Ankle 35.0 cm 34.5 0.5 34.0 cm 1.0 cm   Forefoot 28.0 cm 27.0 1.0 27.0 cm 1.0 cm     Treatment:   Wc assistance from waiting area  Pt amb with 2 canes for restroom prior to session  Total assist wc back to waiting area -  will help to car    Joy received the following manual therapy techniques:- Manual Lymphatic Drainage were applied to the: L LE for 45 minutes, including: MLD and short stretch compression bandaging   Girth assessment    Pt declines table in propped supine  Elevated table- remained sitting with leg resting on chair for treatment session.    MANUAL LYMPHATIC DRAINAGE (MLD):    Positioning as above - seated   Modified attempts to L GI and B inguinal regions - limited due to positioning  Drainage of entire L LE adali lower leg, ankle, and foot with return proximally,  Use of Aquaphor/Eucerin due to dryness.   Consider self massage to abdominal areas, B inguinal areas, thigh, and remaining LE within reach.    MULTILAYERED BANDAGING:  issued supplies and bandaged L LE with cotton stockinette, 6cm and 10cm cellona cotton roll foot to knee, 1-8cm and 2- 10cm Durelast rolls foot to knee, to leave intact 12-24 hrs as tolerated, discontinue with any problems, return rolled bandages next session. Wash and  wear schedules confirmed.   Issued new segments of Tubigrip G for use at home.   R LE remained intact   don/doff training and reviewed position fit and goals of compression  May use R and L LE when not in bandaging.     Joy received therapeutic exercises to develop strength, endurance, ROM, flexibility, and posture including:  Encouraged aps, change in position to avoid sitting with legs down for long periods  Walking with device as tolerated  Elevation for periods as allowed   Prior GSS training - has written/illustrated home exercise program to perform daily.  Aps, knee ROM, avoid dependency, avoid immobility, elevation, walking with compression, deep breathing, use of muscle pump to assist venous return    Home Exercises Provided and Patient Education Provided:  Self Care Home Management Training/Functional Therapeutic Activity x 15 min    4/15/24    Newly ordered products 15-20mmHg 3x and 4x did not fit, as well as Tubigrip F was too small near ankle.  Tubigrip G has been manageable - discussed tier and style and size of compression options.   Advised on need to find compression and gain daily compliance.  Lessor tier options- discussed size, shape and class of compression, size/shape/style to allow a form of compliance with don/doff and wear tolerance.   Pt was provided with an option for tubigrip F or G, internet basic option for Knee High Compression socks wide calf 15-20mmHg.- p tto investeigate sizing.   Demo of Velcro Inelastic Wrap - pt not sure due to feet not being covered, discussed boot or AFW vs shoes    Information to purchase box of Tubigrip G vs F    Pros/cons of basic compression choices were fully reviewed with noted limited size selection, length choice, lessor tier of product, benefits of low cost, relative ease of application and comfort, and additional items available for use.  Pt was trained on don/doff and methods to apply with assistance of gloves or family member.  Position and fit  with appropriate girth, length and support are required.   Daytime use with removal for sleep.   Wear schedules and wash schedules confirmed.   Provided information to secure additional pairs or use as temporary option until medical grade choices are available.     Requested orders KH garments and Velcro Wraps 4/15/24.  Pt notes BioTAB home pump consult per MD.     Present compression:  tubigrip size G and F  Orders and recommendations: knee high compression or Velcro Wraps  PATIENT/FAMILY Education: bandaging/compression wear schedule,  HEP,  Beginning of self massage,  Self or assisted bandaging, compression options, and Risk reduction    Written Home Exercises Provided: yes.  Home exercise and compression plan of management was reviewed and Joy was able to demonstrate understanding prior to the end of the session.  Joy demonstrated good  understanding of the education provided.     Assessment     Joy is a 68 y.o. female referred to outpatient Physical Therapy with a medical diagnosis of   Diagnosis   I89.0 (ICD-10-CM) - Lymphedema of both lower extremities   This patient presents s/p obesity, long standing LBP, knee pain, chart listed CKD3b, unable to tolerate positioning for US testing/studies, voiced intolerance to positioning for treatments, compression intolerance, limited ability to reach her feet, painful sensitive legs and feet  resulting in: obesity related venous lymphedema of the BLE, increased pain, increased stiffness in the ankles, legs, back, history of antibiotics for cellulitis, dependant rubor, difficulty with shoes and compression size/fit/tolerance, as well as difficulty performing walking, elevation, compression choices, compression needs, and placing the pt at higher risk of infection.     No significant reductions in girth or density of L  LE with only tolerance to lite compression and modified positions for sessions.  Pt is also not tolerating bandaging all night due to comfort and  bathroom issues.  Working to achieve a form of compression that will fit, will be tolerated and supportive - will need to don/doff or  to assist.   Fair progression and limited goals.   obesity and chronicity contributing to shape/size of legs complicates management. Due to noted obstacles, therapy is recommending lessor tiers of compression to gain a form of daily compliance.   Joy Is progressing well towards her goals.   Pt prognosis is Fair.     Pt will continue to benefit from skilled outpatient physical therapy to address the deficits listed in the problem list box on initial evaluation, provide pt/family education and to maximize pt's level of independence in the home and community environment.   Pt's spiritual, cultural and educational needs considered and pt agreeable to plan of care and goals.     Anticipated Barriers for therapy: tolerance, compliance, pain, positions, size/shape for fit      The following goals were discussed with the patient and patient is in agreement with them as to be addressed in the treatment plan.      Short Term Goals: (6 weeks)  1. Patient will show decreased girth in B LE by up to 2 cm to allow for LE symmetry, shoe and clothing choice, and ability to apply needed compression.  (progressing, not met)   2. Patient will demonstrate 100% knowledge of lymphedema precautions and signs of infection to allow for reduced lymphedema risk, infection risk, and/or exacerbation of condition.  (progressing, not met)  3. Patient or caregiver will perform self-bandaging techniques and/or wearing of compression garments to allow for lymphatic drainage support, skin elasticity, and reduction in shape and size of limb. (progressing, not met)  4. Patient will perform self lymph drainage techniques to areas within reach to enhance lymphatic drainage and skin condition.  (progressing, not met)  5. Patient will tolerate daily activities with multilayered bandaging to allow for lymphatic and  venous support.  (progressing, not met)     Long Term Goals: (12  weeks)  1. Patient will show decreased girth in B LE by up to 3 cm  to allow for LE symmetry, shoe and clothing choice, and ability to apply needed compression daily.  (progressing, not met)  2. Patient will show reduction in density to mild or less with improved contour of limb to allow for cosmesis, LE symmetry, infection risk reduction, and clothing and compression choice.   (progressing, not met)  3. Patient to nadine/doff compression garment with daily compliance to assist in lymphedema management, skin elasticity, and tissue density.  (progressing, not met)  4. Pt to show improved postural awareness and alignment.  (progressing, not met)  5. Pt to be I and compliant with HEP to allow for increased function in affected limb.   (progressing, not met)  Plan   Plan of care Certification: 3/20/2024 to 6/12/24.     Outpatient Physical Therapy 2 times weekly for 10 weeks to include the following interventions: Patient Education, Self Care, Therapeutic Activities, and Therapeutic Exercise. Complete Decongestive Therapy- compression and home equipment needs to be addressed and assisted.    POC confirmed with Dara Sanchez, PTA    Jasmina Quintero, PT

## 2024-04-15 NOTE — TELEPHONE ENCOUNTER
----- Message from Jasmina Quintero, PT sent at 4/15/2024 12:05 PM CDT -----  Therapy recommends compression    B LE KH 20-30mmHg  B LE Velcro Inelastic Wraps    Thanks,  Jasmina Quintero, PT

## 2024-04-17 ENCOUNTER — CLINICAL SUPPORT (OUTPATIENT)
Dept: REHABILITATION | Facility: HOSPITAL | Age: 69
End: 2024-04-17
Payer: MEDICARE

## 2024-04-17 DIAGNOSIS — M79.89 LEG SWELLING: ICD-10-CM

## 2024-04-17 DIAGNOSIS — I89.0 LYMPHEDEMA OF BOTH LOWER EXTREMITIES: Primary | ICD-10-CM

## 2024-04-17 DIAGNOSIS — E66.01 MORBID OBESITY WITH BMI OF 60.0-69.9, ADULT: ICD-10-CM

## 2024-04-17 DIAGNOSIS — I87.2 VENOUS STASIS DERMATITIS OF BOTH LOWER EXTREMITIES: ICD-10-CM

## 2024-04-17 PROCEDURE — 97535 SELF CARE MNGMENT TRAINING: CPT

## 2024-04-17 PROCEDURE — 97140 MANUAL THERAPY 1/> REGIONS: CPT

## 2024-04-17 NOTE — PROGRESS NOTES
Physical Therapy Daily Treatment Note     Name: Joy JARRETT [facility] Number: [id number]    Therapy Diagnosis:   Encounter Diagnoses   Name Primary?    Lymphedema of both lower extremities Yes    Leg swelling     Morbid obesity with BMI of 60.0-69.9, adult     Venous stasis dermatitis of both lower extremities      Physician: Andrew Driver MD*    Visit Date: 4/17/2024    Physician Orders: PT Eval and Treat - lymphedema  Medical Diagnosis from Referral:   Diagnosis   I89.0 (ICD-10-CM) - Lymphedema of both lower extremities   Evaluation Date: 3/20/2024  Authorization Period Expiration: 2/19/25  Plan of Care Expiration: 6/12/24  Visit # / Visits authorized: 7/20      Time In: 1115am   Time Out: 1215pm  Total Billable Time: 60minutes      Precautions: Standard, Fall, and HTN, morbid obesity, CKD 3b - Silicone and tape sensitivity    Subjective     Pt reports: she will consider orders and visit to DME vendor for compression but will also investigate lessor options to self order - pt admits past issues putting on/off even with her  assisting.   She was compliant with home compression/exercise program.  Response to previous treatment: back pain, joint pain as well as bathroom frequency limit her tolerance to compression and reaching her feet.  Pt expressed understanding of lessor tier of product is not better but is better than not using if she can get some compression to her LE.  Presently using Tubigrip G daily, some walking, some elevation as  her back will allow.    Functional change: declines lying on treatment table - only tolerates sitting with one leg propped up slightly  Pain: 2-5/10 -pain in knees with walking/standing, back pain with positions, no true pain from swelling   Location: bilateral lower legs     Objective     Female to dept via wc- total assistance for wc,   Tubigrip G- slipping up her feet B LE, frayed, socks and Crocs  Amount of Swelling/Location of Swelling: obesity with mod /  severe size/shape of lower legs, ankles and feet, fullness of thighs and abdomen of obesity / body habitus  Modified for HOB elevated, knees flexed   Skin Integrity: pink/red discoloration, dry, nodular density, peau d'orange skin changes, dependant rubor   Palpation/Texture: pitting ankles, feet and distal legs, dense, firm    + B Stemmer Sign  - B Vasu's Sign- legs are tender throughout  Circulation: intact   Girth Measurements (in centimeters)   Sitting, declines lying down,  leg on chair seat  LANDMARK LEFT LE  3/20/24 L LE  4/15/24 Change  L RIGHT LE  3/20/24 R LE  4/17/24 Change  R DIFF   at eval   SBP 80.0 cm 80.0 0 77.0 cm 78.0 1.0 3.0 cm   10 below SBP 59.0 cm 59.0 0 56.0 cm 57.0 1.0 3.0 cm   20 below SBP 58.0 cm 56.5 1.5 59.0 cm 57.0 2.0 1.0 cm   30 below SBP 46.0 cm 46.0 0 46.0 cm 44.0 2.0 0 cm   35 below SBP 38.0 cm 38.5 0.5 38.5 cm 37.5 1.0 0.5 cm   Ankle 35.0 cm 34.5 0.5 34.0 cm 34.0 0 1.0 cm   Forefoot 28.0 cm 27.0 1.0 27.0 cm 27.0 0 1.0 cm     Treatment:   Wc assistance from waiting area  Pt amb with 2 canes for restroom prior to session  Pt sits on treatment table, one leg is resting on seat of chair   Total assist wc back to waiting area -  will help to car    Joy received the following manual therapy techniques:- Manual Lymphatic Drainage were applied to the: R LE for 45 minutes, including: MLD and short stretch compression bandaging   Girth assessment    Pt declines table in propped supine  Elevated table- remained sitting with leg resting on chair for treatment session.    MANUAL LYMPHATIC DRAINAGE (MLD):    Positioning as above - seated   Modified attempts to GI and B inguinal regions - limited due to positioning  Drainage of entire R LE adali lower leg, ankle, and foot with return proximally,  Use of Aquaphor/Eucerin due to dryness.   Consider self massage to abdominal areas, B inguinal areas, thigh, and remaining LE within reach.    MULTILAYERED BANDAGING:  issued supplies and  bandaged R L LE with cotton stockinette, 6cm and 10cm cellona cotton roll foot to knee, 1-8cm and 2- 10cm Durelast rolls foot to knee, to leave intact 12-24 hrs as tolerated, discontinue with any problems, return rolled bandages next session. Wash and wear schedules confirmed.   Issued new segments of Tubigrip G for use at home.   L LE remained intact   don/doff training and reviewed position fit and goals of compression  May use R and L LE when not in bandaging.     Joy received therapeutic exercises to develop strength, endurance, ROM, flexibility, and posture including:  Encouraged aps, change in position to avoid sitting with legs down for long periods  Walking with device as tolerated  Elevation for periods as allowed   Prior GSS training - has written/illustrated home exercise program to perform daily.  Aps, knee ROM, avoid dependency, avoid immobility, elevation, walking with compression, deep breathing, use of muscle pump to assist venous return    Home Exercises Provided and Patient Education Provided:  Self Care Home Management Training/Functional Therapeutic Activity x 15 min    4/17/24    Provided orders from her MD for KH 20-30mmHg and Velcro Inelastic Wraps  Demo of types of both products - reviewed size charts and noted obstacles due to full sizing - may need XXL full calf in garments if available  Velcro wraps often have larger size  options and may add extension straps as  necessary.  DME list and insurance information for vendor to submit for coverage.    [employer] and [employer] review for her to select a lessor tier of product to gain compliance of don/doff ease and wear tolerance.  Provided 2 potential KH wide calf open toe options of her requests  Reviewed lessor tier, lessor class or larger size is not better but only better if able  to assume some compression support.       Her choice of Tubigrip G has been manageable - discussed tier and style and size of compression options- recommend order a  box for home use.   Advised on need to find compression and gain daily compliance.  Lessor tier options- discussed size, shape and class of compression, size/shape/style to allow a form of compliance with don/doff and wear tolerance.   Pt was provided with an option for tubigrip G, internet basic option for Knee High Compression socks wide calf 15-20mmHg. Or 20-30mmHg  She likes open toe.   Demo of Velcro Inelastic Wrap - discussed boot or AFW vs shoes  Information to purchase box of Tubigrip G vs F    Pros/cons of basic compression choices were fully reviewed with noted limited size selection, length choice, lessor tier of product, benefits of low cost, relative ease of application and comfort, and additional items available for use.  Pt was trained on don/doff and methods to apply with assistance of gloves or family member.  Position and fit with appropriate girth, length and support are required.   Daytime use with removal for sleep.   Wear schedules and wash schedules confirmed.   Provided information to secure additional pairs or use as temporary option until medical grade choices are available.     Pt notes BioTAB home pump consult per MD.     Present compression:  tubigrip size G and F  Orders and recommendations: knee high compression or Velcro Wraps  PATIENT/FAMILY Education: bandaging/compression wear schedule,  HEP,  Beginning of self massage,  Self or assisted bandaging, compression options, and Risk reduction    Written Home Exercises Provided: yes.  Home exercise and compression plan of management was reviewed and Joy was able to demonstrate understanding prior to the end of the session.  Joy demonstrated good  understanding of the education provided.     Assessment     Joy is a 68 y.o. female referred to outpatient Physical Therapy with a medical diagnosis of   Diagnosis   I89.0 (ICD-10-CM) - Lymphedema of both lower extremities   This patient presents s/p obesity, long standing LBP, knee  pain, chart listed CKD3b, unable to tolerate positioning for US testing/studies, voiced intolerance to positioning for treatments, compression intolerance, limited ability to reach her feet, painful sensitive legs and feet  resulting in: obesity related venous lymphedema of the BLE, increased pain, increased stiffness in the ankles, legs, back, history of antibiotics for cellulitis, dependant rubor, difficulty with shoes and compression size/fit/tolerance, as well as difficulty performing walking, elevation, compression choices, compression needs, and placing the pt at higher risk of infection.     Slight changes in girth of R LE with min L LE with ongoing obesity related changes, limited tolerance to positions, sessions, and compression choices.    Pt is successfully wearing Tubigrip G to B LE, attempting elevation and some walking with 2 canes.    Therapy has provided alternative options for compression choice to allow compliance and wear support.    Feel home pump system would assist but will also need KH or Velcro Wraps to provide compression support daily when up and moving.   is assisting with don/doff - advised Wraps may be easier to apply and allow size/shape for size selection     Working to achieve a form of compression that will fit, will be tolerated and supportive - will need to don/doff or  to assist.   Obesity and chronicity contributing to shape/size of legs complicates management. Due to noted obstacles, therapy is recommending lessor tiers of compression to gain a form of daily compliance.   Joy Is progressing well towards her goals.   Pt prognosis is Fair.     Pt will continue to benefit from skilled outpatient physical therapy to address the deficits listed in the problem list box on initial evaluation, provide pt/family education and to maximize pt's level of independence in the home and community environment.   Pt's spiritual, cultural and educational needs considered and pt  agreeable to plan of care and goals.     Anticipated Barriers for therapy: tolerance, compliance, pain, positions, size/shape for fit      The following goals were discussed with the patient and patient is in agreement with them as to be addressed in the treatment plan.      Short Term Goals: (6 weeks)  1. Patient will show decreased girth in B LE by up to 2 cm to allow for LE symmetry, shoe and clothing choice, and ability to apply needed compression.  (Progressing- met in some areas)   2. Patient will demonstrate 100% knowledge of lymphedema precautions and signs of infection to allow for reduced lymphedema risk, infection risk, and/or exacerbation of condition.  (progressing, not met)  3. Patient or caregiver will perform self-bandaging techniques and/or wearing of compression garments to allow for lymphatic drainage support, skin elasticity, and reduction in shape and size of limb. (progressing, not met)  4. Patient will perform self lymph drainage techniques to areas within reach to enhance lymphatic drainage and skin condition.  (progressing, not met)  5. Patient will tolerate daily activities with multilayered bandaging to allow for lymphatic and venous support.  (progressing, not met)     Long Term Goals: (12  weeks)  1. Patient will show decreased girth in B LE by up to 3 cm  to allow for LE symmetry, shoe and clothing choice, and ability to apply needed compression daily.  (progressing, not met)  2. Patient will show reduction in density to mild or less with improved contour of limb to allow for cosmesis, LE symmetry, infection risk reduction, and clothing and compression choice.    met)  3. Patient to nadine/doff compression garment with daily compliance to assist in lymphedema management, skin elasticity, and tissue density.  (progressing, not met)  4. Pt to show improved postural awareness and alignment.  (progressing, not met)  5. Pt to be I and compliant with HEP to allow for increased function in  affected limb.   (progressing, not met)  Plan   Plan of care Certification: 3/20/2024 to 6/12/24.     Outpatient Physical Therapy 2 times weekly for 10 weeks to include the following interventions: Patient Education, Self Care, Therapeutic Activities, and Therapeutic Exercise. Complete Decongestive Therapy- compression and home equipment needs to be addressed and assisted.    POC confirmed with Dara Sanchez, PTA    Jasmina Quintero, PT

## 2024-04-19 ENCOUNTER — DOCUMENTATION ONLY (OUTPATIENT)
Dept: CARDIOLOGY | Facility: CLINIC | Age: 69
End: 2024-04-19
Payer: MEDICARE

## 2024-04-19 NOTE — PROGRESS NOTES
Patient is diagnosed with lymphedema and presents with hyperplasia of lower extremity. Patient has previously attempted compression, elevation, and exercise for at least 4 weeks yet swelling persists. It is medically necessary that she receives a pneumatic compression pump.     Patient was measured on 2/28/24:     Left arch 27.7 cm     Left ankle 37.2 cm     Left knee 58.8 cm     Right arch 27.8 cm     Right ankle 37.8 cm     Right knee 57.8 cm

## 2024-04-23 ENCOUNTER — PATIENT MESSAGE (OUTPATIENT)
Dept: CARDIOLOGY | Facility: CLINIC | Age: 69
End: 2024-04-23
Payer: MEDICARE

## 2024-04-23 ENCOUNTER — PATIENT MESSAGE (OUTPATIENT)
Dept: INTERNAL MEDICINE | Facility: CLINIC | Age: 69
End: 2024-04-23
Payer: MEDICARE

## 2024-04-23 DIAGNOSIS — I10 ESSENTIAL HYPERTENSION: ICD-10-CM

## 2024-04-24 VITALS — DIASTOLIC BLOOD PRESSURE: 91 MMHG | SYSTOLIC BLOOD PRESSURE: 135 MMHG

## 2024-04-25 ENCOUNTER — LAB VISIT (OUTPATIENT)
Dept: LAB | Facility: HOSPITAL | Age: 69
End: 2024-04-25
Attending: INTERNAL MEDICINE
Payer: MEDICARE

## 2024-04-25 DIAGNOSIS — I10 ESSENTIAL HYPERTENSION: ICD-10-CM

## 2024-04-25 LAB
ANION GAP SERPL CALC-SCNC: 9 MMOL/L (ref 8–16)
BUN SERPL-MCNC: 14 MG/DL (ref 8–23)
CALCIUM SERPL-MCNC: 9.5 MG/DL (ref 8.7–10.5)
CHLORIDE SERPL-SCNC: 107 MMOL/L (ref 95–110)
CO2 SERPL-SCNC: 25 MMOL/L (ref 23–29)
CREAT SERPL-MCNC: 0.8 MG/DL (ref 0.5–1.4)
EST. GFR  (NO RACE VARIABLE): >60 ML/MIN/1.73 M^2
GLUCOSE SERPL-MCNC: 107 MG/DL (ref 70–110)
POTASSIUM SERPL-SCNC: 4 MMOL/L (ref 3.5–5.1)
SODIUM SERPL-SCNC: 141 MMOL/L (ref 136–145)

## 2024-04-25 PROCEDURE — 80048 BASIC METABOLIC PNL TOTAL CA: CPT | Performed by: INTERNAL MEDICINE

## 2024-04-25 PROCEDURE — 36415 COLL VENOUS BLD VENIPUNCTURE: CPT | Mod: PO | Performed by: INTERNAL MEDICINE

## 2024-04-26 NOTE — TELEPHONE ENCOUNTER
Please find out if she has been taking losartan every day? Or has she forgotten to take it on some days?    What is the lowest BP that she has recorded in the last week?    Has her salt intake increased? Has her weight changed?    I think that she might need to take a higher dose of losartan.     Will review her responses and then will decide if dose increase is necessary.

## 2024-04-29 ENCOUNTER — DOCUMENTATION ONLY (OUTPATIENT)
Dept: CARDIOLOGY | Facility: CLINIC | Age: 69
End: 2024-04-29
Payer: MEDICARE

## 2024-04-29 RX ORDER — LOSARTAN POTASSIUM 100 MG/1
100 TABLET ORAL DAILY
Qty: 30 TABLET | Refills: 11 | Status: SHIPPED | OUTPATIENT
Start: 2024-04-29 | End: 2025-04-29

## 2024-04-29 NOTE — TELEPHONE ENCOUNTER
Reviewed BP readings. If her current meds are acetazolamide bid and losartan 50 mg daily, she should increase losartan to 50 mg bid or she can take 100 mg once a day  I sent a prescription for 100 mg tab

## 2024-04-29 NOTE — TELEPHONE ENCOUNTER
Pt states she is not taking acetazolamide but is taking Losartan 50mg daily.  notified, advised take Losartan 50mg BID or Losartan 100mg daily. Pt notified, verbalized understanding.

## 2024-05-08 ENCOUNTER — CLINICAL SUPPORT (OUTPATIENT)
Dept: REHABILITATION | Facility: HOSPITAL | Age: 69
End: 2024-05-08
Payer: MEDICARE

## 2024-05-08 DIAGNOSIS — E66.01 MORBID OBESITY WITH BMI OF 60.0-69.9, ADULT: ICD-10-CM

## 2024-05-08 DIAGNOSIS — M79.89 LEG SWELLING: ICD-10-CM

## 2024-05-08 DIAGNOSIS — I89.0 LYMPHEDEMA OF BOTH LOWER EXTREMITIES: Primary | ICD-10-CM

## 2024-05-08 DIAGNOSIS — I87.2 VENOUS STASIS DERMATITIS OF BOTH LOWER EXTREMITIES: ICD-10-CM

## 2024-05-08 PROCEDURE — 97535 SELF CARE MNGMENT TRAINING: CPT | Mod: KX

## 2024-05-08 PROCEDURE — 97140 MANUAL THERAPY 1/> REGIONS: CPT | Mod: KX

## 2024-05-08 NOTE — PROGRESS NOTES
Physical Therapy Daily Treatment Note     Name: Joy JARRETT [facility] Number: [id number]    Therapy Diagnosis:   Encounter Diagnoses   Name Primary?    Lymphedema of both lower extremities Yes    Leg swelling     Morbid obesity with BMI of 60.0-69.9, adult     Venous stasis dermatitis of both lower extremities      Physician: Andrew Driver MD*    Visit Date: 5/8/2024    Physician Orders: PT Eval and Treat - lymphedema  Medical Diagnosis from Referral:   Diagnosis   I89.0 (ICD-10-CM) - Lymphedema of both lower extremities   Evaluation Date: 3/20/2024  Authorization Period Expiration: 2/19/25  Plan of Care Expiration: 6/12/24  Visit # / Visits authorized: 8/20      Time In: 955am   Time Out: 1105am  Total Billable Time: 70minutes     FOTO 5/8/24   Precautions: Standard, Fall, and HTN, morbid obesity, CKD 3b - Silicone and tape sensitivity    Subjective     Pt reports: trial use of her new home pump system - L LE hurt around her knee, R leg sleeve does not fit her thigh. She has contacted [employer] representative who is coming out today.     She was compliant with home compression/exercise program.  Response to previous treatment: pt admits OA/DJD with back and knees are main limiting factor.  Pt has not visited DME - now will consider Velcro Inelastic Wraps - encouraged to proceed with fitting, and return for product training.   Due to limited tolerance to components of therapy  - focus is on home product support and self care management.  Pt does have her  assist to apply Tubigrip G daily, some walking, some elevation as her back will allow.    Functional change: amb short distances with 2 canes  Unable to lie on table with legs up - chooses sitting - one leg resting on seat of chair for session.  Pain: 2-5/10 -pain in knees with walking/standing, back pain with positions, no true pain from swelling   Location: bilateral lower legs     Objective     Female to dept via wc- total assistance for wc,   No  compression to either LE, cotton socks and Crocs  Amount of Swelling/Location of Swelling: obesity with mod / severe size/shape of lower legs, ankles and feet, fullness of thighs and abdomen of obesity / body habitus  Modified for HOB elevated, knees flexed   Skin Integrity: pink/red discoloration, dry, nodular density, peau d'orange skin changes, dependant rubor   Palpation/Texture: pitting ankles, feet and distal legs, dense, firm    + B Stemmer Sign  - B Vasu's Sign- legs are tender throughout  Circulation: intact   Girth Measurements (in centimeters)   Sitting, declines lying down,  leg on chair seat  LANDMARK LEFT LE  3/20/24 L LE  4/15/24 Change  L RIGHT LE  3/20/24 R LE  4/17/24 Change  R DIFF   at eval   SBP 80.0 cm 80.0 0 77.0 cm 78.0 1.0 3.0 cm   10 below SBP 59.0 cm 59.0 0 56.0 cm 57.0 1.0 3.0 cm   20 below SBP 58.0 cm 56.5 1.5 59.0 cm 57.0 2.0 1.0 cm   30 below SBP 46.0 cm 46.0 0 46.0 cm 44.0 2.0 0 cm   35 below SBP 38.0 cm 38.5 0.5 38.5 cm 37.5 1.0 0.5 cm   Ankle 35.0 cm 34.5 0.5 34.0 cm 34.0 0 1.0 cm   Forefoot 28.0 cm 27.0 1.0 27.0 cm 27.0 0 1.0 cm     Treatment:   Wc assistance from waiting area  Pt amb with 2 canes for restroom end of session  Pt sits on treatment table, one leg is resting on seat of chair other on small step stool.  Total assist wc back to waiting area -  will help to car    Joy received the following manual therapy techniques:- Manual Lymphatic Drainage were applied to the: L LE for 35 minutes, including: MLD and short stretch compression bandaging   Pt declines table in propped supine  Elevated table- remained sitting with leg resting on chair for treatment session.  Limited in access to LE due to positioning    MANUAL LYMPHATIC DRAINAGE (MLD):    Positioning as above - seated   Modified attempts to GI and B inguinal regions - limited due to positioning  Drainage of entire L LE adali lower leg, ankle, and foot with return proximally,  Use of Aquaphor/Eucerin due to  dryness.   Consider self massage to abdominal areas, B inguinal areas, thigh, and remaining LE within reach.    MULTILAYERED BANDAGING:  issued supplies and bandaged L LE with cotton stockinette, 6cm and 10cm cellona cotton roll foot to knee, 1-8cm and 2- 10cm Durelast rolls foot to knee, to leave intact 12-24 hrs as tolerated, discontinue with any problems, return rolled bandages next session. Wash and wear schedules confirmed.   Did not wear or bring in  her Tubigrip- no product to apply to R LE  ( New segments had been issued last visit)    don/doff training and reviewed position fit and goals of compression  May use R and L LE when not in bandaging.     Joy received therapeutic exercises to develop strength, endurance, ROM, flexibility, and posture including:  Encouraged aps, change in position to avoid sitting with legs down for long periods  Walking with device as tolerated  Elevation for periods as allowed   Prior GSS training - has written/illustrated home exercise program to perform daily.  Aps, knee ROM, avoid dependency, avoid immobility, elevation, walking with compression, deep breathing, use of muscle pump to assist venous return    Home Exercises Provided and Patient Education Provided:  Self Care Home Management Training/Functional Therapeutic Activity x 35 min    [employer] pump representative if larger sleeve is required R LE to fit thigh, pt also notes length may be too long  Reviewed goals  of use,  pt notes L knee pain with use - always monitor or modify - should not be painful - could be knee position or knee joint issues vs swelling management.  Vendor training, vendor support for sleeve sizes     Reissued her orders and [facility]   Don/doff training of Velcro Inelastic Wraps - goals for compliance  Pt admits obstacles for basic stockings, reaching her feet, size/shape,  assistance and his limitations.    5/8/24 Provided orders from her MD for TIMOTHY  20-30mmHg and Velcro Inelastic Wraps- suggest Velcro Wraps   DME list again and methods to secure new products  Also Tubigrip although lessor class and lessor tier is option only as compliance allows - truly not supportive enough but better than no support    Demo of types of both products - reviewed size charts and noted obstacles due to full sizing - may need XXL full calf in KH garments if available  Velcro wraps often have larger size  options and may add extension straps as necessary.  DME list and insurance information for vendor to submit for coverage.  Pt needs to contact DME vendor to schedule fitting, bring MD orders and seek insurance authorization for cost/coverage per her plan.    Prior visits:  Internet and [employer] review- if needed to select a lessor tier of product to gain compliance of don/doff ease and wear tolerance.  May purchase tubigrip G box and cut new segments when worn/stretched or wide calf options in lessor tier and/or lessor class only for attempts at compliance  4/17/24: 2 potential KH wide calf open toe options of her requests  Reviewed lessor tier, lessor class or larger size is not better but only better if able  to assume some compression support.       Her choice of Tubigrip G has been manageable - discussed tier and style and size of compression options- recommend order a box for home use.   Advised on need to find compression and gain daily compliance.  Lessor tier options- discussed size, shape and class of compression, size/shape/style to allow a form of compliance with don/doff and wear tolerance.   Pt was provided with an option for tubigrip G, internet basic option for Knee High Compression socks wide calf 15-20mmHg or 20-30mmHg    Demo of Velcro Inelastic Wrap - under liner has compression to ankle/foot and wrap covers her leg,  this allows shoe wear  Additional options discussed  regarding boot or AFW vs shoes  Information to purchase box of Tubigrip G vs F- pt notes F was  too small in past    Pros/cons of basic compression choices were fully reviewed with noted limited size selection, length choice, lessor tier of product, benefits of low cost, relative ease of application and comfort, and additional items available for use.  Pt was trained on don/doff and methods to apply with assistance of gloves or family member.  Position and fit with appropriate girth, length and support are required.   Daytime use with removal for sleep.   Wear schedules and wash schedules confirmed.   Provided information to secure additional pairs or use as temporary option until medical grade choices are available.     [employer] home pump consult per MD.   Monitor position and tolerance - discontinue if painful - alter Knee positioning or alignment - admits Knee issues     Present compression:  tubigrip size G  Orders and recommendations: knee high compression or Velcro Wraps  PATIENT/FAMILY Education: bandaging/compression wear schedule,  HEP,  Beginning of self massage,  Self or assisted bandaging, compression options, and Risk reduction    Written Home Exercises Provided: yes.  Home exercise and compression plan of management was reviewed and Joy was able to demonstrate understanding prior to the end of the session.  Joy demonstrated good  understanding of the education provided.     Assessment     Joy is a 68 y.o. female referred to outpatient Physical Therapy with a medical diagnosis of   Diagnosis   I89.0 (ICD-10-CM) - Lymphedema of both lower extremities   This patient presents s/p obesity, long standing LBP, knee pain, chart listed CKD3b, unable to tolerate positioning for US testing/studies, voiced intolerance to positioning for treatments, compression intolerance, limited ability to reach her feet, painful sensitive legs and feet  resulting in: obesity related venous lymphedema of the BLE, increased pain, increased stiffness in the ankles, legs, back, history of antibiotics for cellulitis,  dependant rubor, difficulty with shoes and compression size/fit/tolerance, as well as difficulty performing walking, elevation, compression choices, compression needs, and placing the pt at higher risk of infection.     Only fair tolerance to therapy sessions limited by position, attendance, and components provided due to knee and back pain with positions therefore focus in on home care and compression choices,  KH with wide calf in larger size  or lessor class may allow compliance although  will need to assist.    Suggest trial of Inelastic Velcro Wrap - larger size options, ease of application and compression support.    Pt has information for DME provider, MD orders and should now proceed to fitting and purchase of compression.  Pt may also choose Tubigrip or lessor class of KH socks only to gain a form of compliance.   Therapy has provided alternative options for compression choice to allow compliance and wear support.  Pt should return in 3-4 weeks with new products for training in don/doff.  Pump support with vendor for sleeves and recommend daily use and to follow with compression.    Joy Is progressing fair towards her goals.   Pt prognosis is Fair.     Pt will continue to benefit from skilled outpatient physical therapy to address the deficits listed in the problem list box on initial evaluation, provide pt/family education and to maximize pt's level of independence in the home and community environment.   Pt's spiritual, cultural and educational needs considered and pt agreeable to plan of care and goals.     Anticipated Barriers for therapy: tolerance, compliance, pain, positions, size/shape for fit      The following goals were discussed with the patient and patient is in agreement with them as to be addressed in the treatment plan.      Short Term Goals: (6 weeks)  1. Patient will show decreased girth in B LE by up to 2 cm to allow for LE symmetry, shoe and clothing choice, and ability to  apply needed compression.  (Progressing- met in some areas)   2. Patient will demonstrate 100% knowledge of lymphedema precautions and signs of infection to allow for reduced lymphedema risk, infection risk, and/or exacerbation of condition.  met)  3. Patient or caregiver will perform self-bandaging techniques and/or wearing of compression garments to allow for lymphatic drainage support, skin elasticity, and reduction in shape and size of limb. (Progressing  4. Patient will perform self lymph drainage techniques to areas within reach to enhance lymphatic drainage and skin condition.  (progressing,   5. Patient will tolerate daily activities with multilayered bandaging to allow for lymphatic and venous support.   met)     Long Term Goals: (12  weeks)  1. Patient will show decreased girth in B LE by up to 3 cm  to allow for LE symmetry, shoe and clothing choice, and ability to apply needed compression daily.  (progressing, not met)  2. Patient will show reduction in density to mild or less with improved contour of limb to allow for cosmesis, LE symmetry, infection risk reduction, and clothing and compression choice.    met)  3. Patient to nadine/doff compression garment with daily compliance to assist in lymphedema management, skin elasticity, and tissue density.  (progressing, not met)  4. Pt to show improved postural awareness and alignment.  (progressing, not met)  5. Pt to be I and compliant with HEP to allow for increased function in affected limb.   (progressing, ongoing  Plan   Plan of care Certification: 3/20/2024 to 6/12/24.     Outpatient Physical Therapy 2 times weekly for 10 weeks to include the following interventions: Patient Education, Self Care, Therapeutic Activities, and Therapeutic Exercise. Complete Decongestive Therapy- compression and home equipment needs to be addressed and assisted.    Return once products are received for training.     POC confirmed with Dara Sanchez, PTA    Jasmina Quintero, PT

## 2024-05-29 ENCOUNTER — TELEPHONE (OUTPATIENT)
Dept: BARIATRICS | Facility: CLINIC | Age: 69
End: 2024-05-29
Payer: MEDICARE

## 2024-06-03 ENCOUNTER — TELEPHONE (OUTPATIENT)
Dept: CARDIOLOGY | Facility: CLINIC | Age: 69
End: 2024-06-03
Payer: MEDICARE

## 2024-06-03 NOTE — TELEPHONE ENCOUNTER
Lymphedema Pump Progress:  [x] Order, clinical notes, and face sheet faxed to [employer] for home pneumatic compression.  [x] Reached out to patient for follow up. Wish to inquire about symptoms of lymphedema and if conservative therapy has been working.  [x] Updated progress note sent to [employer].  [x] Patient pneumatic compression pump approved and shipped by [employer].

## 2024-06-11 DIAGNOSIS — Z80.3 FAMILY HISTORY OF BREAST CANCER: ICD-10-CM

## 2024-06-11 DIAGNOSIS — Z91.89 AT HIGH RISK FOR BREAST CANCER: Primary | ICD-10-CM

## 2024-06-17 DIAGNOSIS — E87.6 HYPOKALEMIA: ICD-10-CM

## 2024-06-18 RX ORDER — POTASSIUM CHLORIDE 750 MG/1
TABLET, EXTENDED RELEASE ORAL
Qty: 180 TABLET | Refills: 3 | Status: SHIPPED | OUTPATIENT
Start: 2024-06-18

## 2024-06-18 RX ORDER — PRAVASTATIN SODIUM 40 MG/1
40 TABLET ORAL DAILY
Qty: 90 TABLET | Refills: 3 | Status: SHIPPED | OUTPATIENT
Start: 2024-06-18

## 2024-06-20 ENCOUNTER — OFFICE VISIT (OUTPATIENT)
Dept: BARIATRICS | Facility: CLINIC | Age: 69
End: 2024-06-20
Payer: MEDICARE

## 2024-06-20 VITALS
SYSTOLIC BLOOD PRESSURE: 128 MMHG | DIASTOLIC BLOOD PRESSURE: 73 MMHG | BODY MASS INDEX: 68.46 KG/M2 | WEIGHT: 293 LBS | OXYGEN SATURATION: 94 % | HEART RATE: 71 BPM

## 2024-06-20 DIAGNOSIS — I89.0 LYMPHEDEMA OF BOTH LOWER EXTREMITIES: ICD-10-CM

## 2024-06-20 DIAGNOSIS — I10 ESSENTIAL HYPERTENSION: ICD-10-CM

## 2024-06-20 DIAGNOSIS — E66.01 CLASS 3 SEVERE OBESITY DUE TO EXCESS CALORIES WITH SERIOUS COMORBIDITY AND BODY MASS INDEX (BMI) OF 60.0 TO 69.9 IN ADULT: Primary | ICD-10-CM

## 2024-06-20 DIAGNOSIS — R16.0 HEPATOMEGALY: ICD-10-CM

## 2024-06-20 DIAGNOSIS — G47.33 OSA ON CPAP: ICD-10-CM

## 2024-06-20 DIAGNOSIS — E78.00 PURE HYPERCHOLESTEROLEMIA: ICD-10-CM

## 2024-06-20 DIAGNOSIS — Z71.3 ENCOUNTER FOR WEIGHT LOSS COUNSELING: ICD-10-CM

## 2024-06-20 PROCEDURE — 99204 OFFICE O/P NEW MOD 45 MIN: CPT | Mod: S$PBB,,, | Performed by: STUDENT IN AN ORGANIZED HEALTH CARE EDUCATION/TRAINING PROGRAM

## 2024-06-20 PROCEDURE — 99999 PR PBB SHADOW E&M-EST. PATIENT-LVL V: CPT | Mod: PBBFAC,,, | Performed by: STUDENT IN AN ORGANIZED HEALTH CARE EDUCATION/TRAINING PROGRAM

## 2024-06-20 PROCEDURE — 99215 OFFICE O/P EST HI 40 MIN: CPT | Mod: PBBFAC | Performed by: STUDENT IN AN ORGANIZED HEALTH CARE EDUCATION/TRAINING PROGRAM

## 2024-06-20 RX ORDER — SEMAGLUTIDE 1 MG/.5ML
1 INJECTION, SOLUTION SUBCUTANEOUS
Qty: 2 ML | Refills: 0 | Status: SHIPPED | OUTPATIENT
Start: 2024-08-15 | End: 2024-09-06

## 2024-06-20 RX ORDER — SEMAGLUTIDE 0.25 MG/.5ML
0.25 INJECTION, SOLUTION SUBCUTANEOUS
Qty: 2 ML | Refills: 0 | Status: SHIPPED | OUTPATIENT
Start: 2024-06-20 | End: 2024-07-12

## 2024-06-20 RX ORDER — SEMAGLUTIDE 0.5 MG/.5ML
0.5 INJECTION, SOLUTION SUBCUTANEOUS
Qty: 2 ML | Refills: 0 | Status: SHIPPED | OUTPATIENT
Start: 2024-07-18 | End: 2024-08-09

## 2024-06-20 NOTE — PROGRESS NOTES
Subjective     Patient ID: Joy Singer is a 68 y.o. female.    Chief Complaint: Consult and Obesity    Patient presents for treatment of obesity.   Referred by Dr. Driver    Co-morbidities   HTN  LIZETH on CPAP  Lymphedema, BLE  CKD  Migraines  Pseudotumor cerebri     History of Weight Loss Efforts  No prior use of weight loss medications    Current Physical Activity  Sedentary, no regular exercise routine    Current Eating Habits  Breakfast - premier protein shake, boiled egg  Lunch - sandwich with chips  Dinner - spaghetti and meatballs, tacos, steak and baked potato, fried chicken, pizza; fast food about 2-3x/week  Snacks - between meals and after dinner; fruit (tangerines, grapes), ice cream cups, cookies, chips  Beverages - water, root beer    Medical Weight Loss - unable to stand on InBody Scale  6/20/2024: 424 lbs 2.6 oz, BMI 68.46      Review of Systems   Constitutional:  Negative for chills and fever.   Respiratory:  Negative for shortness of breath.    Cardiovascular:  Negative for chest pain and palpitations.   Gastrointestinal:  Negative for abdominal pain, nausea and vomiting.   Neurological:  Negative for dizziness and light-headedness.          Objective    Latest Reference Range & Units 09/26/23 10:13 12/22/23 10:13 04/25/24 10:41   WBC 3.90 - 12.70 K/uL 7.58     RBC 4.00 - 5.40 M/uL 5.67 (H)     Hemoglobin 12.0 - 16.0 g/dL 13.7     Hematocrit 37.0 - 48.5 % 43.2     MCV 82 - 98 fL 76 (L)     MCH 27.0 - 31.0 pg 24.2 (L)     MCHC 32.0 - 36.0 g/dL 31.7 (L)     RDW 11.5 - 14.5 % 16.5 (H)     Platelet Count 150 - 450 K/uL 150     MPV 9.2 - 12.9 fL 11.2     Gran % 38.0 - 73.0 % 56.3     Lymph % 18.0 - 48.0 % 29.9     Mono % 4.0 - 15.0 % 9.6     Eos % 0.0 - 8.0 % 2.8     Basophil % 0.0 - 1.9 % 0.9     Immature Granulocytes 0.0 - 0.5 % 0.5     Gran # (ANC) 1.8 - 7.7 K/uL 4.3     Lymph # 1.0 - 4.8 K/uL 2.3     Mono # 0.3 - 1.0 K/uL 0.7     Eos # 0.0 - 0.5 K/uL 0.2     Baso # 0.00 - 0.20 K/uL 0.07      Immature Grans (Abs) 0.00 - 0.04 K/uL 0.04     nRBC 0 /100 WBC 0     Differential Method  Automated     Sodium 136 - 145 mmol/L 142  142 142 141   Potassium 3.5 - 5.1 mmol/L 3.9  3.9 4.1 4.0   Chloride 95 - 110 mmol/L 110  110 109 107   CO2 23 - 29 mmol/L 23  23 25 25   Anion Gap 8 - 16 mmol/L 9  9 8 9   BUN 8 - 23 mg/dL 16  16 17 14   Creatinine 0.5 - 1.4 mg/dL 0.8  0.8 0.8 0.8   eGFR >60 mL/min/1.73 m^2 >60.0  >60.0 >60.0 >60.0   Glucose 70 - 110 mg/dL 115 (H)  115 (H) 108 107   Calcium 8.7 - 10.5 mg/dL 8.8  8.8 9.2 9.5   ALP 55 - 135 U/L 65 78    PROTEIN TOTAL 6.0 - 8.4 g/dL 6.9 6.9    Albumin 3.5 - 5.2 g/dL 3.3 (L) 3.3 (L)    BILIRUBIN TOTAL 0.1 - 1.0 mg/dL 0.4 0.4    AST 10 - 40 U/L 27 27    ALT 10 - 44 U/L 28 30    Cholesterol Total 120 - 199 mg/dL 161 168    HDL 40 - 75 mg/dL 40 48    HDL/Cholesterol Ratio 20.0 - 50.0 % 24.8 28.6    Non-HDL Cholesterol mg/dL 121 120    Total Cholesterol/HDL Ratio 2.0 - 5.0  4.0 3.5    Triglycerides 30 - 150 mg/dL 107 92    LDL Cholesterol 63.0 - 159.0 mg/dL 99.6 101.6    Hemoglobin A1C External 4.0 - 5.6 % 5.6     Estimated Avg Glucose 68 - 131 mg/dL 114     (H): Data is abnormally high  (L): Data is abnormally low    Vitals:    06/20/24 1410   BP: 128/73   Pulse: 71       Physical Exam  Vitals reviewed.   Constitutional:       General: She is not in acute distress.     Appearance: Normal appearance. She is obese. She is not ill-appearing, toxic-appearing or diaphoretic.   HENT:      Head: Normocephalic and atraumatic.   Cardiovascular:      Rate and Rhythm: Normal rate.   Pulmonary:      Effort: Pulmonary effort is normal. No respiratory distress.   Skin:     General: Skin is warm and dry.   Neurological:      Mental Status: She is alert and oriented to person, place, and time.            Assessment and Plan     1. Class 3 severe obesity due to excess calories with serious comorbidity and body mass index (BMI) of 60.0 to 69.9 in adult  -     semaglutide, weight loss,  (WEGOVY) 0.25 mg/0.5 mL PnIj; Inject 0.25 mg into the skin every 7 days. for 4 doses  Dispense: 2 mL; Refill: 0  -     semaglutide, weight loss, (WEGOVY) 0.5 mg/0.5 mL PnIj; Inject 0.5 mg into the skin every 7 days. for 4 doses  Dispense: 2 mL; Refill: 0  -     semaglutide, weight loss, (WEGOVY) 1 mg/0.5 mL PnIj; Inject 1 mg into the skin every 7 days. for 4 doses  Dispense: 2 mL; Refill: 0    2. Essential hypertension  -     semaglutide, weight loss, (WEGOVY) 0.25 mg/0.5 mL PnIj; Inject 0.25 mg into the skin every 7 days. for 4 doses  Dispense: 2 mL; Refill: 0  -     semaglutide, weight loss, (WEGOVY) 0.5 mg/0.5 mL PnIj; Inject 0.5 mg into the skin every 7 days. for 4 doses  Dispense: 2 mL; Refill: 0  -     semaglutide, weight loss, (WEGOVY) 1 mg/0.5 mL PnIj; Inject 1 mg into the skin every 7 days. for 4 doses  Dispense: 2 mL; Refill: 0    3. Pure hypercholesterolemia  -     semaglutide, weight loss, (WEGOVY) 0.25 mg/0.5 mL PnIj; Inject 0.25 mg into the skin every 7 days. for 4 doses  Dispense: 2 mL; Refill: 0  -     semaglutide, weight loss, (WEGOVY) 0.5 mg/0.5 mL PnIj; Inject 0.5 mg into the skin every 7 days. for 4 doses  Dispense: 2 mL; Refill: 0  -     semaglutide, weight loss, (WEGOVY) 1 mg/0.5 mL PnIj; Inject 1 mg into the skin every 7 days. for 4 doses  Dispense: 2 mL; Refill: 0    4. Hepatomegaly  -     semaglutide, weight loss, (WEGOVY) 0.25 mg/0.5 mL PnIj; Inject 0.25 mg into the skin every 7 days. for 4 doses  Dispense: 2 mL; Refill: 0  -     semaglutide, weight loss, (WEGOVY) 0.5 mg/0.5 mL PnIj; Inject 0.5 mg into the skin every 7 days. for 4 doses  Dispense: 2 mL; Refill: 0  -     semaglutide, weight loss, (WEGOVY) 1 mg/0.5 mL PnIj; Inject 1 mg into the skin every 7 days. for 4 doses  Dispense: 2 mL; Refill: 0    5. LIZETH on CPAP  -     semaglutide, weight loss, (WEGOVY) 0.25 mg/0.5 mL PnIj; Inject 0.25 mg into the skin every 7 days. for 4 doses  Dispense: 2 mL; Refill: 0  -     semaglutide, weight  loss, (WEGOVY) 0.5 mg/0.5 mL PnIj; Inject 0.5 mg into the skin every 7 days. for 4 doses  Dispense: 2 mL; Refill: 0  -     semaglutide, weight loss, (WEGOVY) 1 mg/0.5 mL PnIj; Inject 1 mg into the skin every 7 days. for 4 doses  Dispense: 2 mL; Refill: 0    6. Lymphedema of both lower extremities  -     semaglutide, weight loss, (WEGOVY) 0.25 mg/0.5 mL PnIj; Inject 0.25 mg into the skin every 7 days. for 4 doses  Dispense: 2 mL; Refill: 0  -     semaglutide, weight loss, (WEGOVY) 0.5 mg/0.5 mL PnIj; Inject 0.5 mg into the skin every 7 days. for 4 doses  Dispense: 2 mL; Refill: 0  -     semaglutide, weight loss, (WEGOVY) 1 mg/0.5 mL PnIj; Inject 1 mg into the skin every 7 days. for 4 doses  Dispense: 2 mL; Refill: 0    7. Encounter for weight loss counseling        - Log all food and beverage intake with a daily calorie goal of [id number] calories per day

## 2024-06-20 NOTE — PATIENT INSTRUCTIONS
Start Wegovy 0.25 mg once a week x 4 weeks, then 0.5 mg once a week for 4 weeks, then 1 mg once a week x 4 weeks.    Decrease portions as soon as you start Wegovy. Avoid fried, greasy, fatty foods.     Some nausea in the first couple weeks is not unusual as your body adjusts to the medication.     If you experience persistent severe abdominal pain that radiates to your back, please call the office.             Copyright © 2011, [facility]. For more information about The Healthy Eating Plate, please see The Nutrition Source, Department of Nutrition, [employer], www.thenutritionsource.org, and [employer], www.health.[employer].      Meal Planning & Grocery Shopping    Meal planning builds the foundation for healthy eating. When you have structured ideas for healthy meals and foods available at home to prepare those meals, weight control becomes easier.  If only healthy foods are available at home, then you will be much more likely to eat healthy foods. And you will be less likely to go to a restaurant or  a fast food meal, which tend to be unhealthy and higher in calories than meals prepared at home.      Take 5-10 minutes each week to plan meals for the next 7 days.  Make a grocery list based on the meal plan.    Grocery Shopping Tips:  Shop on a full stomach.  Schedule your shopping for times when you are most motivated and able to be disciplined about your purchases. For example, after a stressful day at work it may be difficult to make the healthiest choices. Shopping at other times, such as early in the morning or after dinner, may be easier.  Focus your shopping on the outside aisles of the store, which tend to contain more fresh foods and lower calorie foods. The inside aisles tend to have more processed foods.  Stick to your list. Avoid buying unhealthy items just because they are on sale.   Compare nutrition labels to check the number of calories  and percentage of fat.      What to buy:    Vegetables  Fresh vegetables  Frozen vegetables with no sauce or added salt  Canned vegetables with no sauce or added salt    Protein  Lean meats, such as chicken and turkey  Limit red meats, such as beef to no more than 1x/week  Limit processed meats, such as cold cuts, zabala, sausage, and hot dogs. Look for brands that have no nitrites and are minimally processed. Consider turkey sausage or turkey zabala.  Fish and Shellfish  Eggs  Dried beans  Canned beans (reduced sodium)    Fat  Use healthy oils, such as olive oil or canola oil, for cooking, salad dressings, etc.  Unflavored nuts and seeds  Nut butters (no added sugar)    Dairy  Yogurt (no sugar added)  Cheese  Low-fat milk  Unsweetened nondairy milks (almond milk, soy milk, etc)    Fruit  Fresh Fruit  Frozen fruit with no added sugar  Canned fruit with no added sugar  Dried fruit with no added sugar  100% fruit juice    Whole Grains  Single ingredient grains, such as oats, quinoa, brown rice  Whole-wheat pasta  Sprouted whole-grain bread    What to avoid:  - Avoid fried foods  - Avoid foods with added sugar  - Avoid sugar-sweetened beverages  - Avoid ultra-processed foods

## 2024-07-19 ENCOUNTER — PATIENT OUTREACH (OUTPATIENT)
Dept: ADMINISTRATIVE | Facility: HOSPITAL | Age: 69
End: 2024-07-19
Payer: MEDICARE

## 2024-07-19 VITALS — DIASTOLIC BLOOD PRESSURE: 73 MMHG | SYSTOLIC BLOOD PRESSURE: 128 MMHG

## 2024-07-26 ENCOUNTER — DOCUMENTATION ONLY (OUTPATIENT)
Dept: REHABILITATION | Facility: HOSPITAL | Age: 69
End: 2024-07-26
Payer: MEDICARE

## 2024-07-26 NOTE — PROGRESS NOTES
[facility]  Discharge Note    [facility]  [facility] Number: [id number]    Physician: Andrew Driver MD*     Physician Orders: PT Eval and Treat - lymphedema  Medical Diagnosis from Referral:   Diagnosis   I89.0 (ICD-10-CM) - Lymphedema of both lower extremities   Evaluation Date: 3/20/2024  Authorization Period Expiration: 2/19/25  Plan of Care Expiration: 6/12/24  Visit # / Visits authorized: 8/20      Precautions: Standard, Fall, and HTN, morbid obesity, CKD 3b - Silicone and tape sensitivity    Date of Last visit: 5/8/24  Total Visits Received: 8    ASSESSMENT      Per last visit limited tolerance to therapy components and was to return with compression products for training.   Per chart review noted Bio TAB home pump system was delivered/vendor training support.     Per last visit seen:  Only fair tolerance to therapy sessions limited by position, attendance, and components provided due to knee and back pain with positions therefore focus in on home care and compression choices,  KH with wide calf in larger size  or lessor class may allow compliance although  will need to assist.    Suggest trial of Inelastic Velcro Wrap - larger size options, ease of application and compression support.     Pt has information for DME provider, MD orders and should now proceed to fitting and purchase of compression.  Pt may also choose Tubigrip or lessor class of KH socks only to gain a form of compliance.   Therapy has provided alternative options for compression choice to allow compliance and wear support.  Pt should return in 3-4 weeks with new products for training in don/doff.  Pump support with vendor for sleeves and recommend daily use and to follow with compression.    Discharge reason: Patient has not attended therapy since 5/8/24, Patient has reached the maximum rehab potential for the present time, and Other:  focus shifted to home compression choices     Discharge  FOTO Score: na    Goals: Short Term Goals: (6 weeks)  1. Patient will show decreased girth in B LE by up to 2 cm to allow for LE symmetry, shoe and clothing choice, and ability to apply needed compression.  (Progressing- met in some areas)   2. Patient will demonstrate 100% knowledge of lymphedema precautions and signs of infection to allow for reduced lymphedema risk, infection risk, and/or exacerbation of condition.  met)  3. Patient or caregiver will perform self-bandaging techniques and/or wearing of compression garments to allow for lymphatic drainage support, skin elasticity, and reduction in shape and size of limb. (Progressing  4. Patient will perform self lymph drainage techniques to areas within reach to enhance lymphatic drainage and skin condition.  (progressing,   5. Patient will tolerate daily activities with multilayered bandaging to allow for lymphatic and venous support.   met)     Long Term Goals: (12  weeks)  1. Patient will show decreased girth in B LE by up to 3 cm  to allow for LE symmetry, shoe and clothing choice, and ability to apply needed compression daily.  (progressing, not met)  2. Patient will show reduction in density to mild or less with improved contour of limb to allow for cosmesis, LE symmetry, infection risk reduction, and clothing and compression choice.    met)  3. Patient to nadine/doff compression garment with daily compliance to assist in lymphedema management, skin elasticity, and tissue density.  (progressing, not met)  4. Pt to show improved postural awareness and alignment.  (progressing, not met)  5. Pt to be I and compliant with HEP to allow for increased function in affected limb.   (progressing, ongoing    PLAN   This patient is discharged from Physical Therapy      Jasmina Quintero, PT

## 2024-09-10 ENCOUNTER — TELEPHONE (OUTPATIENT)
Dept: HEMATOLOGY/ONCOLOGY | Facility: CLINIC | Age: 69
End: 2024-09-10
Payer: MEDICARE

## 2024-09-10 NOTE — TELEPHONE ENCOUNTER
Spoke with pt.   Pt rescheduled to 9/30   Message sent to damaris to reschedule scan due to weather.       ----- Message from Alvarez Kaufman RN sent at 9/10/2024 10:42 AM CDT -----  Regarding: FW: R/S Appt  Contact: GRICELDA SANON [[id number]]    ----- Message -----  From: Felicita Gamboa  Sent: 9/10/2024  10:41 AM CDT  To: #  Subject: R/S Appt                                         RESCHEDULE/APPTS    Current Appt Date:  09/11/2024    Type of Appt: EP and Mammogram    Physician: Russel    Reason for Scheduling:  Weather    Caller: GRICELDA SANON [[id number]]    Contact Preference:  [phone] (home)

## 2024-09-21 NOTE — PROGRESS NOTES
Chart reviewed  Immunizations reconciled   Left message regarding bp   - [facility] on file reviewed, DNR order placed. DNR on hold for cardioversion today and for Ortho surgery tomorrow and resume post-op and patient okay with holding DNR for procedures and surgery.   - Will discuss further closer to discharge if patient wants to pursue life vest as per Cardiology recs as not congruent with DNR status.

## 2024-10-07 ENCOUNTER — TELEPHONE (OUTPATIENT)
Dept: HEMATOLOGY/ONCOLOGY | Facility: CLINIC | Age: 69
End: 2024-10-07
Payer: MEDICARE

## 2024-10-07 NOTE — TELEPHONE ENCOUNTER
----- Message from Gagandeep sent at 10/7/2024 12:30 PM CDT -----  Regarding: Appt  Contact: [phone]  Who call ? Joy Singer     What is the request Details : Pt calling to speak with someone in provider office regards scheduling  1 year follow up appt. Please call pt back at       Can clinic  use patient portal  : No     What number to call back : [phone]

## 2024-10-14 ENCOUNTER — TELEPHONE (OUTPATIENT)
Dept: HEMATOLOGY/ONCOLOGY | Facility: CLINIC | Age: 69
End: 2024-10-14
Payer: MEDICARE

## 2024-10-14 NOTE — TELEPHONE ENCOUNTER
Called pt to get her 9/30/25 appt rescheduled. Pt wanted later afternoon. First available was 1/6/25. Pt was okay with that date. Message to Meño Regan to get us set up to follow.     ----- Message from Nurse Meño sent at 10/14/2024 11:37 AM CDT -----  Regarding: FW: Appt Access  Contact: [phone]    ----- Message -----  From: Elizabeth Flores  Sent: 10/14/2024  11:34 AM CDT  To: Meño Regan LPN  Subject: Appt Access                                      Patient is calling regarding Appointment Access (message) for #Scheduling Request     Appt Type: US Breast Bilat Complete     Date/Time Preference: first available     Treating Provider: SERJIO Goode      Caller Name: Patient (Joy Singer)     Contact Preference: [phone]    Comments/notes: Patient is calling to reschedule both mammo and  appt w SERJIO Goode. Please give pt a call to reschedule.

## 2024-10-23 ENCOUNTER — OFFICE VISIT (OUTPATIENT)
Dept: INTERNAL MEDICINE | Facility: CLINIC | Age: 69
End: 2024-10-23
Payer: MEDICARE

## 2024-10-23 ENCOUNTER — PATIENT MESSAGE (OUTPATIENT)
Dept: INTERNAL MEDICINE | Facility: CLINIC | Age: 69
End: 2024-10-23
Payer: MEDICARE

## 2024-10-23 VITALS
HEART RATE: 79 BPM | WEIGHT: 293 LBS | RESPIRATION RATE: 18 BRPM | OXYGEN SATURATION: 93 % | TEMPERATURE: 98 F | BODY MASS INDEX: 47.09 KG/M2 | DIASTOLIC BLOOD PRESSURE: 78 MMHG | HEIGHT: 66 IN | SYSTOLIC BLOOD PRESSURE: 118 MMHG

## 2024-10-23 DIAGNOSIS — N30.01 ACUTE CYSTITIS WITH HEMATURIA: Primary | ICD-10-CM

## 2024-10-23 LAB
BILIRUB SERPL-MCNC: NORMAL MG/DL
BLOOD URINE, POC: NEGATIVE
CLARITY, POC UA: CLEAR
COLOR, POC UA: YELLOW
GLUCOSE UR QL STRIP: NORMAL
KETONES UR QL STRIP: NEGATIVE
LEUKOCYTE ESTERASE URINE, POC: NORMAL
NITRITE, POC UA: NORMAL
PH, POC UA: 6
PROTEIN, POC: NORMAL
SPECIFIC GRAVITY, POC UA: 1025
UROBILINOGEN, POC UA: NORMAL

## 2024-10-23 PROCEDURE — 99999PBSHW POCT URINE DIPSTICK WITHOUT MICROSCOPE: Mod: PBBFAC,,,

## 2024-10-23 PROCEDURE — 81002 URINALYSIS NONAUTO W/O SCOPE: CPT | Mod: PBBFAC,PO | Performed by: INTERNAL MEDICINE

## 2024-10-23 PROCEDURE — 99999 PR PBB SHADOW E&M-EST. PATIENT-LVL IV: CPT | Mod: PBBFAC,,, | Performed by: INTERNAL MEDICINE

## 2024-10-23 PROCEDURE — 99214 OFFICE O/P EST MOD 30 MIN: CPT | Mod: PBBFAC,PO | Performed by: INTERNAL MEDICINE

## 2024-10-23 RX ORDER — NITROFURANTOIN 25; 75 MG/1; MG/1
100 CAPSULE ORAL 2 TIMES DAILY
Qty: 14 CAPSULE | Refills: 0 | Status: SHIPPED | OUTPATIENT
Start: 2024-10-23

## 2024-10-23 NOTE — PROGRESS NOTES
History of present illness:   69-year-old lady with a history of morbid obesity, sleep apnea, dyslipidemia is in today with recent noticing slight pink tinge to urinalysis in the toilet bowl also noting such on her pads.  Also couple of days of moderate dysuria.  No back pain.  No abdominal pain no fever no chills no generalized body aches.  She has a known prior history of UTIs.  No changes in bowel habits.    Current medications:   Medication list noted and reviewed.      Review of systems:   Constitutional: No fever no chills.    Respiratory: No cough shortness of breath.  GI: No nausea vomiting.  No abdominal pain.    Physical examination:   General: Alert female no acute distress.    Vital signs:  Afebrile.    Back: No CVA tenderness.  Mental status: Alert oriented affect mood all appropriate.    Data:   POCT urinalysis with 50 RBCs.  Trace leukocytes.  Negative nitrite      Impression:   Probable acute cystitis.    Plan:  Urine is submitted for culture and sensitivity.    Begin Macrobid 100 mg b.i.d. seven days.  Advise if without resolution of symptoms or any worsening.

## 2024-10-23 NOTE — TELEPHONE ENCOUNTER
I spoke to pt and scheduled her an appt with Dr. Irvin today at 11 am.  Pt verbalized understanding

## 2024-11-14 NOTE — PROGRESS NOTES
Chart reviewed  Immunizations reconciled   colonoscopy updated      Is This A New Presentation, Or A Follow-Up?: Acne How Severe Is Your Acne?: moderate

## 2024-11-26 ENCOUNTER — PATIENT MESSAGE (OUTPATIENT)
Dept: ADMINISTRATIVE | Facility: HOSPITAL | Age: 69
End: 2024-11-26
Payer: MEDICARE

## 2024-12-13 DIAGNOSIS — G47.00 INSOMNIA, UNSPECIFIED TYPE: ICD-10-CM

## 2024-12-13 RX ORDER — TRAZODONE HYDROCHLORIDE 50 MG/1
TABLET ORAL
Qty: 90 TABLET | Refills: 0 | Status: SHIPPED | OUTPATIENT
Start: 2024-12-13

## 2024-12-13 NOTE — TELEPHONE ENCOUNTER
Refill Decision Note   Joy Enmanuel  is requesting a refill authorization.  Brief Assessment and Rationale for Refill:  Approve     Medication Therapy Plan:         Comments:     Note composed:12:48 PM 12/13/2024

## 2024-12-13 NOTE — TELEPHONE ENCOUNTER
No care due was identified.  [facility] Embedded Care Due Messages. Reference number: [id number].   12/13/2024 8:07:35 AM CST

## 2024-12-17 NOTE — PROGRESS NOTES
LMOV for pt to return my call to scheduled 2mo f/u.  2mo, not a choice for recall     no chest pain and no edema.

## 2025-01-06 ENCOUNTER — TELEPHONE (OUTPATIENT)
Dept: HEMATOLOGY/ONCOLOGY | Facility: CLINIC | Age: 70
End: 2025-01-06
Payer: MEDICARE

## 2025-01-06 ENCOUNTER — HOSPITAL ENCOUNTER (OUTPATIENT)
Dept: RADIOLOGY | Facility: HOSPITAL | Age: 70
Discharge: HOME | End: 2025-01-06
Attending: NURSE PRACTITIONER
Payer: MEDICARE

## 2025-01-06 ENCOUNTER — TELEPHONE (OUTPATIENT)
Dept: HEMATOLOGY/ONCOLOGY | Facility: CLINIC | Age: 70
End: 2025-01-06

## 2025-01-06 DIAGNOSIS — Z91.89 AT HIGH RISK FOR BREAST CANCER: ICD-10-CM

## 2025-01-06 DIAGNOSIS — Z80.3 FAMILY HISTORY OF BREAST CANCER: ICD-10-CM

## 2025-01-06 PROCEDURE — 77062 BREAST TOMOSYNTHESIS BI: CPT | Mod: 26,,, | Performed by: RADIOLOGY

## 2025-01-06 PROCEDURE — 77066 DX MAMMO INCL CAD BI: CPT | Mod: 26,,, | Performed by: RADIOLOGY

## 2025-01-06 PROCEDURE — 76641 ULTRASOUND BREAST COMPLETE: CPT | Mod: TC,50

## 2025-01-06 PROCEDURE — 76641 ULTRASOUND BREAST COMPLETE: CPT | Mod: 26,50,, | Performed by: RADIOLOGY

## 2025-01-06 PROCEDURE — 77062 BREAST TOMOSYNTHESIS BI: CPT | Mod: TC

## 2025-01-06 NOTE — TELEPHONE ENCOUNTER
----- Message from Nura sent at 1/6/2025 11:52 AM CST -----  Regarding: Scheduling Request  Contact: [phone]  Scheduling Request           Appt Type:  Ep      Date/Time Preference: Next available      Caller Name: pt      Contact Preference:   [phone]    Comment: schedule fu

## 2025-01-06 NOTE — TELEPHONE ENCOUNTER
"CALLED PT. No answer. Left voicemail.       ----- Message from Mervat sent at 1/6/2025 11:03 AM CST -----  Regarding: pt advice  Contact: [phone]  .Name Of Caller: Self     Contact Preference?:[phone]     What is the nature of the call?: in reference to appt 1/6/25 was verbally cancelled over the phone needing to speak to office, pls call      Additional Notes:  "Thank you for all that you do for our patients"  "

## 2025-02-12 ENCOUNTER — TELEPHONE (OUTPATIENT)
Dept: HEMATOLOGY/ONCOLOGY | Facility: CLINIC | Age: 70
End: 2025-02-12
Payer: MEDICARE

## 2025-02-22 DIAGNOSIS — Z00.00 ENCOUNTER FOR MEDICARE ANNUAL WELLNESS EXAM: ICD-10-CM

## 2025-04-03 ENCOUNTER — TELEPHONE (OUTPATIENT)
Dept: HEMATOLOGY/ONCOLOGY | Facility: CLINIC | Age: 70
End: 2025-04-03
Payer: MEDICARE

## 2025-04-03 DIAGNOSIS — Z91.89 AT HIGH RISK FOR BREAST CANCER: Primary | ICD-10-CM

## 2025-04-03 DIAGNOSIS — Z80.3 FAMILY HISTORY OF BREAST CANCER: ICD-10-CM

## 2025-04-03 NOTE — TELEPHONE ENCOUNTER
Informed patient that orders for Breast US has been entered and message will be sent to nurse that schedules in [facility]. Patient verbalized understanding, patient requested Adal to complete scan, patient informed that no promises can be made in regards to whom will complete exam but will be included in the communication to the nurse.     Patient verbalized understanding.     No further questions or concerns noted during call.

## 2025-04-03 NOTE — TELEPHONE ENCOUNTER
----- Message from Minnie sent at 4/3/2025  3:21 PM CDT -----  Regarding: Scheduling Request (Ultrasound)  Contact: Joy  CONSULT/ADVISORYName of Caller: Joy Contact Preference: [phone] of Call: Pt needs orders in for an 3 month f/u ultrasound that was supposed to be scheduled for 4/14/25. Would like a call back with orders in and to be scheduled in for the ultrasound for the same day as her appt with Doctor.

## 2025-04-04 NOTE — TELEPHONE ENCOUNTER
Patient contacted and provided with appointment information  Appointment details confirmed  No further questions or concerns noted during call.

## 2025-04-14 ENCOUNTER — HOSPITAL ENCOUNTER (OUTPATIENT)
Dept: RADIOLOGY | Facility: HOSPITAL | Age: 70
Discharge: HOME | End: 2025-04-14
Attending: NURSE PRACTITIONER
Payer: MEDICARE

## 2025-04-14 ENCOUNTER — TELEPHONE (OUTPATIENT)
Dept: HEMATOLOGY/ONCOLOGY | Facility: CLINIC | Age: 70
End: 2025-04-14
Payer: MEDICARE

## 2025-04-14 ENCOUNTER — OFFICE VISIT (OUTPATIENT)
Dept: HEMATOLOGY/ONCOLOGY | Facility: CLINIC | Age: 70
End: 2025-04-14
Payer: MEDICARE

## 2025-04-14 VITALS
OXYGEN SATURATION: 95 % | RESPIRATION RATE: 17 BRPM | TEMPERATURE: 98 F | DIASTOLIC BLOOD PRESSURE: 89 MMHG | HEART RATE: 70 BPM | BODY MASS INDEX: 68.6 KG/M2 | SYSTOLIC BLOOD PRESSURE: 171 MMHG | HEIGHT: 66 IN

## 2025-04-14 DIAGNOSIS — R92.323 SCATTERED FIBROGLANDULAR TISSUE DENSITY OF BOTH BREASTS ON MAMMOGRAPHY: ICD-10-CM

## 2025-04-14 DIAGNOSIS — B37.89 CANDIDIASIS OF BREAST: Primary | ICD-10-CM

## 2025-04-14 DIAGNOSIS — Z91.89 AT HIGH RISK FOR BREAST CANCER: ICD-10-CM

## 2025-04-14 DIAGNOSIS — Z12.31 ENCOUNTER FOR SCREENING MAMMOGRAM FOR BREAST CANCER: ICD-10-CM

## 2025-04-14 DIAGNOSIS — Z80.3 FAMILY HISTORY OF BREAST CANCER: ICD-10-CM

## 2025-04-14 DIAGNOSIS — R92.8 ABNORMALITY OF LEFT BREAST ON SCREENING MAMMOGRAM: ICD-10-CM

## 2025-04-14 PROCEDURE — G2211 COMPLEX E/M VISIT ADD ON: HCPCS | Mod: S$PBB,,, | Performed by: NURSE PRACTITIONER

## 2025-04-14 PROCEDURE — 99214 OFFICE O/P EST MOD 30 MIN: CPT | Mod: S$PBB,,, | Performed by: NURSE PRACTITIONER

## 2025-04-14 PROCEDURE — 76642 ULTRASOUND BREAST LIMITED: CPT | Mod: 26,LT,, | Performed by: RADIOLOGY

## 2025-04-14 PROCEDURE — 76642 ULTRASOUND BREAST LIMITED: CPT | Mod: TC,LT

## 2025-04-14 PROCEDURE — 99999 PR PBB SHADOW E&M-EST. PATIENT-LVL IV: CPT | Mod: PBBFAC,,, | Performed by: NURSE PRACTITIONER

## 2025-04-14 PROCEDURE — 99214 OFFICE O/P EST MOD 30 MIN: CPT | Mod: PBBFAC,25 | Performed by: NURSE PRACTITIONER

## 2025-04-14 RX ORDER — NYSTATIN 100000 [USP'U]/G
POWDER TOPICAL 4 TIMES DAILY
Qty: 30 G | Refills: 2 | Status: SHIPPED | OUTPATIENT
Start: 2025-04-14

## 2025-04-14 NOTE — Clinical Note
Hi! This patient is high risk but its according to her Anel Model. She is adamant about having additional high risk screening every year. She also wants to be on a Q 6 month regimen. She is having her left breast US today for a short interval followup. She had a bilateral breast US AND a MMG in January 2025. Do you think I should order the bilateral breast US for July of this year or just wait until 2026?  Thanks for the advice in advance!  PJ

## 2025-04-14 NOTE — PROGRESS NOTES
Chief Complaint   Patient presents with    At high risk for breast cancer       HPI:   Joy Singer presents for follow up  of increased risk of breast cancer.   She was previously seen by Dr. Wall. I last saw her 8/2023        Today, Feels good.   No complaints today  She has tinea and does see derm (non [facility])  No breast concerns today  No new pain issues other than her knees. In WC but usually ambulates with cane  No other issues or complaints           High Risk Breast cancer specific history:  - Height:  5'6  - Weight:  385 lbs > 421lbs> did not weigh today  - Breast density per BI-RADS:  b - Scattered fibroglandular density  - Age at menarche:  13 yo  - Number of pregnancies: 2; age of first live birth: 28 yo  - History of breast feeding: Yes - 3 months  - Age at menopause, if applicable:  surgical at 50 yo  -Uterus and ovaries intact: S/p BSO at 50 y/o  - HRT: No  - Genetic testing: Yes - [employer] Common Hereditary Cancers Panel, heterozygous VUS identified in BRCA2, in 2019.  Opts out of further Genetic counseling.   - Personal history of cancer: No  - Previous chest radiation exposure between ages 10-31 years old: No  - Personal history of breast biopsy: Yes - Hx of mammotome biopsies with Dr. Seema Aguilar, several among both breasts, all benign per patient, pt states last was well over 2 years ago, pt has since transferred her breast-related care to Dr. Wall, pt does not recall ever being told she has LCIS or atypia/hyperplasia of the breast(s)  - [employer] Inheritance: No  - Family cancer history:   Family History   Problem Relation Age of Onset    Breast cancer Maternal Aunt           50s    Breast cancer Maternal Cousin 68         daughter of aunt dx'd in 50s    Breast cancer Maternal Cousin 64         mat 1st cousin, mother unaffected, myRisk with [employer] 2017 was negative ([place])    Cancer Paternal Grandmother           brain cancer    Breast cancer Maternal Aunt 68     Breast cancer Maternal Cousin           1st cousin, daughter of unaffected aunt    Breast cancer Maternal Cousin           mother's brother's daughter    Ovarian cancer Neg Hx        Social History:  Tobacco use:  denies  Alcohol use:  Very seldom  Swims, hasn't exercised in a while, plans to resume           Past Medical   Past Medical History:   Diagnosis Date    BRCA1 negative     BRCA2 negative     Breast cyst     Carotid artery occlusion     Chronic back pain     Chronic bilateral low back pain without sciatica 11/08/2016    Colon polyps 2016    COVID-19 09/27/2022    Fibrocystic breast     HA (headache)     Hyperlipidemia     Morbid obesity with BMI of 60.0-69.9, adult     Obstructive sleep apnea (adult) (pediatric)     Pseudotumor cerebri      Patient Active Problem List   Diagnosis    Essential hypertension    LIZETH on CPAP    Idiopathic stabbing headache    Breast mass in female    Carpal tunnel syndrome    Degeneration of lumbar intervertebral disc    Empty sella syndrome    Disorder of sacroiliac joint    Migraine without aura, not refractory    Tibialis tendinitis    Leg swelling    Primary osteoarthritis of right knee    Pseudotumor cerebri    Mild anxiety    Bilateral foot pain    Elevated hematocrit    Prediabetes    Morbid obesity with BMI of 60.0-69.9, adult    Erythrocytosis    Pure hypercholesterolemia    Gross hematuria    Chronic kidney disease, stage 3b     High-tone pelvic floor dysfunction    Atrophic vaginitis    Mixed urge and stress incontinence    Aortic atherosclerosis    Hepatomegaly    Bilateral primary osteoarthritis of knee    Lymphedema of both lower extremities    Venous stasis dermatitis of both lower extremities    Morbid obesity     Social History   Social History     Tobacco Use    Smoking status: Never     Passive exposure: Never    Smokeless tobacco: Never   Substance Use Topics    Alcohol use: Yes     Comment: social once or  twice a year    Drug use: No     Family History  Family History   Problem Relation Name Age of Onset    Emphysema Mother      Lung cancer Mother      Hypertension Father      Heart failure Father      Diabetes Father      Emphysema Father      Heart failure Sister      Valvular heart disease Sister      Breast cancer Maternal Aunt Vanesa         50s    Breast cancer Maternal Cousin  68        daughter of aunt dx'd in 50s    Breast cancer Maternal Cousin  64        mat 1st cousin, mother unaffected, myRisk with [employer] 2017 was negative    Cancer Paternal Grandmother          brain cancer    Breast cancer Maternal Aunt Von 68    Lung cancer Maternal Aunt      Lung cancer Maternal Uncle      Lung cancer Maternal Uncle      Lung cancer Maternal Uncle      Lung cancer Maternal Uncle          in addition to mesothelioma    Breast cancer Maternal Cousin          1st cousin, daughter of unaffected aunt    Breast cancer Maternal Cousin          mother's brother's daughter    Ovarian cancer Neg Hx       Medications    Current Outpatient Medications:     ascorbic acid, vitamin C, 500 mg TbSR, Take 1 tablet by mouth once daily., Disp: , Rfl:     calcium carbonate-vitamin D3 600 mg(1,500mg) -500 unit Cap, Calcium 600 with Vitamin D3 600 mg (1,500 mg)-500 unit capsule  Take 1 capsule twice a day by oral route., Disp: , Rfl:     carisoprodol (SOMA) 350 MG tablet, Take 350 mg by mouth continuous prn., Disp: , Rfl:     cranberry fruit extract (CRANBERRY EXTRACT ORAL), Take 2 capsules by mouth once daily., Disp: , Rfl:     diphenhydrAMINE (BENADRYL) 25 mg capsule, Take 25 mg by mouth every 6 (six) hours as needed for Itching., Disp: , Rfl:     docusate sodium (COLACE) 100 MG capsule, Colace 100 mg capsule  Take 2 capsule twice a day by oral route., Disp: , Rfl:     folic acid (FOLVITE) 400 MCG tablet, Take 400 mcg by mouth once daily., Disp: , Rfl:     hydrocodone-acetaminophen 7.5-325mg (NORCO) 7.5-325  mg per tablet, TK 1 T PO Q 6 TO 8 H PRN, Disp: , Rfl: 0    hydrocortisone (ANUSOL-HC) 2.5 % rectal cream, Place rectally 2 (two) times daily., Disp: 28 g, Rfl: 1    ketoconazole (NIZORAL) 2 % shampoo, every 30 days., Disp: , Rfl:     ketoconazole 2 % Foam, Extina 2 % topical foam as needed, Disp: , Rfl:     Lactobac no.51/Bifidobact no.4 (UP4 PROBIOTICS ULTRA ORAL), Take 1 capsule by mouth once daily., Disp: , Rfl:     losartan (COZAAR) 100 MG tablet, Take 1 tablet (100 mg total) by mouth once daily., Disp: 30 tablet, Rfl: 11    multivitamin-iron-folic acid (CENTRUM) Tab, Take 1 tablet by mouth once daily., Disp: , Rfl:     nitrofurantoin, macrocrystal-monohydrate, (MACROBID) 100 MG capsule, Take 1 capsule (100 mg total) by mouth 2 (two) times daily., Disp: 14 capsule, Rfl: 0    polyethylene glycol (GLYCOLAX) 17 gram/dose powder, Take 17 g by mouth once daily., Disp: , Rfl:     potassium chloride SA (K-DUR,KLOR-CON M) 10 MEQ tablet, TAKE 2 TABLETS ONCE DAILY, Disp: 180 tablet, Rfl: 3    pravastatin (PRAVACHOL) 40 MG tablet, Take 1 tablet (40 mg total) by mouth once daily., Disp: 90 tablet, Rfl: 3    traZODone (DESYREL) 50 MG tablet, TAKE 1 TABLET(50 MG) BY MOUTH EVERY EVENING, Disp: 90 tablet, Rfl: 0    vitamin E 1000 UNIT capsule, Take 1,000 Units by mouth once daily., Disp: , Rfl:     nystatin (MYCOSTATIN) powder, Apply topically 4 (four) times daily. As needed for rash, Disp: 30 g, Rfl: 2  Allergies  Review of patient's allergies indicates:   Allergen Reactions    Insect venom Edema, Itching and Swelling    Adhesive tape-silicones Hives    Oxycodone-acetaminophen Itching and Other (See Comments)    Unable to assess Rash     Insect Bites    Venom-honey bee Rash and Other (See Comments)       Review of Systems       See above   All other systems reviewed and are negative.    Objective:      Vitals:   Vitals:    04/14/25 1356   BP: (!) 171/89   Pulse: 70   Resp: 17   Temp: 98 °F (36.7 °C)  "  TempSrc: Oral   SpO2: 95%   Height: 5' 6" (1.676 m)     BMI: Body mass index is 68.6 kg/m².   Body surface area is 3 meters squared.    Physical Exam  Vitals signs reviewed.   Constitutional:  Normal appearance. NAD. Obese.   HENT: Normocephalic.   Eyes: Pupils are equal, round, and reactive to light.   Cardiovascular: RRR  Pulmonary: Pulmonary effort is normal. CTA  Musculoskeletal: Normal range of motion.   Lymphadenopathy: No cervical adenopathy. No axillary adenopathy.   Skin: Skin is warm and dry.   Neurological:  She is alert and oriented to person, place, and time.   Psychiatric: Mood normal.     Breast Exam: Bilateral breast normal. No masses, no tenderness, no skin or nipple abnormalities.  No lymphadenopathy.  Exam done while sitting in chair as unable to to get on exam table.   Well demarcated erythematous rash under breasts. Also with area of Tinea       Laboratory Data: reviewed most recent       Imaging: reviewed most recent          Assessment:     1. Candidiasis of breast    2. At high risk for breast cancer    3. Family history of breast cancer    4. Encounter for screening mammogram for breast cancer    5. Scattered fibroglandular tissue density of both breasts on mammography    6. Abnormality of left breast on screening mammogram                Plan:     Anel model recalculated at 2.5%. This places her in the high risk category in  regard to her 5 yr risk.   We discussed options patient opts to have annual mammogram and annual ultrasound. She would like them to be 6 months apart.   Left breast US today for a short interval follow up  She wishes to continue f/u with me yearly.   Lifestyle modifications as previously discussed  Offered nutrition  She opted out of Ozempic when seen in weight loss clinic  Encouraged breast awareness, including monthly breast self-exams.   Rx Nystatin for rash under breast    Reassurance given  Encouraged to see derm  Breast US today as scheduled.   Message to " radiologist to get advice on tailoring her schedule to q 6 months    RTC 1 year      Addendum:   Per Dr. Winn recs  Recommendation:  Annual screening mammography, next due July 2025 (in order to return to a staggered screening schedule).    Annual bilateral screening breast ultrasound, next due January 2026.     The findings and recommendations were discussed directly with her by Dr. VERA Winn at the time of interpretation.     Route Chart for Scheduling    Med Onc Chart Routing      Follow up with physician    Follow up with [facility] . [facility] with [facility]  same day   Infusion scheduling note    Injection scheduling note    Labs    Imaging   MMG 7/2025   Pharmacy appointment    Other referrals                 Questions were encouraged and answered to patient's satisfaction, and patient verbalized understanding of information and agreement with the plan. Advised patient to RTC with any interval changes or concerns.      Patient is in agreement with the proposed treatment plan. All questions were answered to the patient's satisfaction. Pt knows to call clinic for any new or worsening symptoms and if anything is needed before the next clinic visit.    George Goode, MSN, APRN, FNP-C  Nurse Practitioner to Dr. Sana Quintero  [facility]  [facility]  Hematology & Medical Oncology  [address] [place] [id number]  ph. [phone] ext [phone]  Fax. [phone]              30 minutes of total time spent on the encounter, which includes face to face time and non-face to face time preparing to see the patient (eg, review of tests), Obtaining and/or reviewing separately obtained history, Documenting clinical information in the electronic or other health record, Independently interpreting results (not separately reported) and communicating results to the patient/family/caregiver, or Care coordination (not separately reported).        code applied:  patient requires or will require a continuous, longitudinal, and active collaborative plan of care related to this patient's health condition, high risk breast cancer --the management of which requires the direction of a practitioner with specialized clinical knowledge, skill, and expertise.

## 2025-04-14 NOTE — TELEPHONE ENCOUNTER
Called to let spouse know imaging and appt are in the same building.   Pt will be here.     Advised that we do have a transporter to assist if needed.       ----- Message from Aishwarya sent at 4/14/2025  9:48 AM CDT -----  Regarding: Consult Advisory  Contact: GRICELDA SANON [[id number]]  Consult/AdvisoryName Of Caller: Davdi (spouse)Contact Preference:[phone] (home)  Nature of call:  Pt and spouse are both handicap wont be able to make it from hospital and [facility] please advise. Please call.

## 2025-05-05 ENCOUNTER — TELEPHONE (OUTPATIENT)
Dept: CARDIOLOGY | Facility: CLINIC | Age: 70
End: 2025-05-05
Payer: MEDICARE

## 2025-05-05 DIAGNOSIS — Z00.00 ANNUAL PHYSICAL EXAM: ICD-10-CM

## 2025-05-05 DIAGNOSIS — I10 ESSENTIAL HYPERTENSION: ICD-10-CM

## 2025-05-05 DIAGNOSIS — R73.03 PREDIABETES: Primary | ICD-10-CM

## 2025-05-06 RX ORDER — LOSARTAN POTASSIUM 100 MG/1
TABLET ORAL
Qty: 90 TABLET | OUTPATIENT
Start: 2025-05-06

## 2025-05-06 NOTE — TELEPHONE ENCOUNTER
Received refill request for losartan.    She had no labs in >1 year. Overdue for annual exam and labs 1 week prior.    Please schedule labs and appointment and I will refill a small supply of losartan.

## 2025-05-20 ENCOUNTER — TELEPHONE (OUTPATIENT)
Dept: INTERNAL MEDICINE | Facility: CLINIC | Age: 70
End: 2025-05-20
Payer: MEDICARE

## 2025-05-20 ENCOUNTER — LAB VISIT (OUTPATIENT)
Dept: LAB | Facility: HOSPITAL | Age: 70
End: 2025-05-20
Payer: MEDICARE

## 2025-05-20 DIAGNOSIS — Z00.00 ANNUAL PHYSICAL EXAM: ICD-10-CM

## 2025-05-20 DIAGNOSIS — R82.90 FOUL SMELLING URINE: ICD-10-CM

## 2025-05-20 DIAGNOSIS — R82.90 FOUL SMELLING URINE: Primary | ICD-10-CM

## 2025-05-20 DIAGNOSIS — I10 ESSENTIAL HYPERTENSION: ICD-10-CM

## 2025-05-20 LAB
BACTERIA #/AREA URNS AUTO: ABNORMAL /HPF
BILIRUB UR QL STRIP.AUTO: NEGATIVE
CLARITY UR: ABNORMAL
COLOR UR AUTO: YELLOW
GLUCOSE UR QL STRIP: NEGATIVE
HGB UR QL STRIP: ABNORMAL
HOLD SPECIMEN: NORMAL
HYALINE CASTS UR QL AUTO: 3 /LPF (ref 0–1)
KETONES UR QL STRIP: NEGATIVE
LEUKOCYTE ESTERASE UR QL STRIP: ABNORMAL
MICROSCOPIC COMMENT: ABNORMAL
NITRITE UR QL STRIP: NEGATIVE
PH UR STRIP: 6 [PH]
PROT UR QL STRIP: ABNORMAL
RBC #/AREA URNS AUTO: 2 /HPF (ref 0–4)
SP GR UR STRIP: 1.02
SQUAMOUS #/AREA URNS AUTO: 4 /HPF
UROBILINOGEN UR STRIP-ACNC: NEGATIVE EU/DL
WBC #/AREA URNS AUTO: 2 /HPF (ref 0–5)

## 2025-05-20 PROCEDURE — 81001 URINALYSIS AUTO W/SCOPE: CPT

## 2025-05-20 NOTE — TELEPHONE ENCOUNTER
----- Message from Michelle sent at 5/20/2025  9:23 AM CDT -----  Contact: [phone] Patient  Type: Orders RequestWhat orders/ testing are being requested? Urine Culture for UTI   urinalysis is already inIs there a future appointment scheduled for the patient with PCP? NoWhen?Would you prefer a response via gridComm? Call Back Please. Thank youComments:  ----- Message -----  From: Michelle Ramirez  Sent: 5/20/2025   9:24 AM CDT  To: Hatt[facility] Staff    Type: Orders RequestWhat orders/ testing are being requested? Urine Culture and Urinalysis for UTIIs there a future appointment scheduled for the patient with PCP? NoWhen?Would you prefer a response via Xtraicet? Call Back Please. Thank youComments:

## 2025-05-20 NOTE — TELEPHONE ENCOUNTER
----- Message from Kamryn sent at 5/20/2025 11:26 AM CDT -----  Contact: Self/ [phone]  Patient is returning a phone call.Who left a message for the patient: Frank patient know what this is regarding:  YesWould you like a call back, or a response through your [facility] portal?:   Call back Comments: pt stated that she is trying to make sure that  the Urine is going to be cultured . Please advise  ----- Message -----  From: Kamryn Friedman  Sent: 5/20/2025  11:29 AM CDT  To: [facility] Staff    Patient is returning a phone call.Who left a message for the patient: NancyOrtiz patient know what this is regarding:  YesWould you like a call back, or a response through your [facility] portal?:   Call back Comments:

## 2025-05-22 ENCOUNTER — PATIENT MESSAGE (OUTPATIENT)
Dept: INTERNAL MEDICINE | Facility: CLINIC | Age: 70
End: 2025-05-22
Payer: MEDICARE

## 2025-05-23 ENCOUNTER — TELEPHONE (OUTPATIENT)
Dept: INTERNAL MEDICINE | Facility: CLINIC | Age: 70
End: 2025-05-23
Payer: MEDICARE

## 2025-05-23 ENCOUNTER — DOCUMENTATION ONLY (OUTPATIENT)
Dept: INTERNAL MEDICINE | Facility: CLINIC | Age: 70
End: 2025-05-23
Payer: MEDICARE

## 2025-05-23 ENCOUNTER — RESULTS FOLLOW-UP (OUTPATIENT)
Dept: INTERNAL MEDICINE | Facility: CLINIC | Age: 70
End: 2025-05-23

## 2025-05-23 NOTE — TELEPHONE ENCOUNTER
----- Message from Jenelle sent at 5/23/2025 12:41 PM CDT -----  Regarding: 3rd message.  Contact: [phone]  Patient called, concern that she has been leaving messages for Dr Kuhn and no one has reach out to her, patient stated that labs were done and came back with infection but antibiotics has not been sent. Please advise. Thank you.

## 2025-05-23 NOTE — PROGRESS NOTES
I have not seen this patient since the last virtual appointment on 2/2/2024.     Her last annual exam was in September 2023 and she is overdue for annual exam. She was previously a patient of Urology and last visited them in 2022.    The patient called on 5/20/25 to request a urinalysis and culture. She did not provide a lot of information about symptoms other than foul smelling urine.     Results were available on 5/20/25 but did not show any clear signs of a UTI. She called the nurse and requested an antibiotic.     Results and comments were released on 5/23/25. There was no reason to start an antibiotic based on results and lack of information about her current symptoms. She also had no recent labs to indicate that kidney function is normal.Additionally, it would not have been appropriate to send an antibiotic in this situation.    Patient needs to schedule an annual exam with labs.

## 2025-05-23 NOTE — TELEPHONE ENCOUNTER
Spoke with pt, gave urine results, pt stated she is not happy with this call and will get a second opinion on her results.

## 2025-05-26 ENCOUNTER — LAB VISIT (OUTPATIENT)
Dept: LAB | Facility: HOSPITAL | Age: 70
End: 2025-05-26
Attending: INTERNAL MEDICINE
Payer: MEDICARE

## 2025-05-26 DIAGNOSIS — Z00.00 ANNUAL PHYSICAL EXAM: ICD-10-CM

## 2025-05-26 DIAGNOSIS — I10 ESSENTIAL HYPERTENSION: ICD-10-CM

## 2025-05-26 DIAGNOSIS — R73.03 PREDIABETES: ICD-10-CM

## 2025-05-26 LAB
ABSOLUTE EOSINOPHIL (OHS): 0.26 K/UL
ABSOLUTE MONOCYTE (OHS): 0.81 K/UL (ref 0.3–1)
ABSOLUTE NEUTROPHIL COUNT (OHS): 4.72 K/UL (ref 1.8–7.7)
ALBUMIN SERPL BCP-MCNC: 3.4 G/DL (ref 3.5–5.2)
ALP SERPL-CCNC: 66 UNIT/L (ref 40–150)
ALT SERPL W/O P-5'-P-CCNC: 31 UNIT/L (ref 10–44)
ANION GAP (OHS): 11 MMOL/L (ref 8–16)
AST SERPL-CCNC: 37 UNIT/L (ref 11–45)
BASOPHILS # BLD AUTO: 0.09 K/UL
BASOPHILS NFR BLD AUTO: 1.1 %
BILIRUB SERPL-MCNC: 0.4 MG/DL (ref 0.1–1)
BUN SERPL-MCNC: 14 MG/DL (ref 8–23)
CALCIUM SERPL-MCNC: 8.9 MG/DL (ref 8.7–10.5)
CHLORIDE SERPL-SCNC: 105 MMOL/L (ref 95–110)
CHOLEST SERPL-MCNC: 152 MG/DL (ref 120–199)
CHOLEST/HDLC SERPL: 3.5 {RATIO} (ref 2–5)
CO2 SERPL-SCNC: 22 MMOL/L (ref 23–29)
CREAT SERPL-MCNC: 0.8 MG/DL (ref 0.5–1.4)
EAG (OHS): 126 MG/DL (ref 68–131)
ERYTHROCYTE [DISTWIDTH] IN BLOOD BY AUTOMATED COUNT: 17.1 % (ref 11.5–14.5)
GFR SERPLBLD CREATININE-BSD FMLA CKD-EPI: >60 ML/MIN/1.73/M2
GLUCOSE SERPL-MCNC: 115 MG/DL (ref 70–110)
HBA1C MFR BLD: 6 % (ref 4–5.6)
HCT VFR BLD AUTO: 46.7 % (ref 37–48.5)
HDLC SERPL-MCNC: 43 MG/DL (ref 40–75)
HDLC SERPL: 28.3 % (ref 20–50)
HGB BLD-MCNC: 14 GM/DL (ref 12–16)
IMM GRANULOCYTES # BLD AUTO: 0.03 K/UL (ref 0–0.04)
IMM GRANULOCYTES NFR BLD AUTO: 0.4 % (ref 0–0.5)
LDLC SERPL CALC-MCNC: 82 MG/DL (ref 63–159)
LYMPHOCYTES # BLD AUTO: 2.45 K/UL (ref 1–4.8)
MCH RBC QN AUTO: 23.6 PG (ref 27–31)
MCHC RBC AUTO-ENTMCNC: 30 G/DL (ref 32–36)
MCV RBC AUTO: 79 FL (ref 82–98)
NONHDLC SERPL-MCNC: 109 MG/DL
NUCLEATED RBC (/100WBC) (OHS): 0 /100 WBC
PLATELET # BLD AUTO: 144 K/UL (ref 150–450)
PMV BLD AUTO: 11.5 FL (ref 9.2–12.9)
POTASSIUM SERPL-SCNC: 4.5 MMOL/L (ref 3.5–5.1)
PROT SERPL-MCNC: 7.1 GM/DL (ref 6–8.4)
RBC # BLD AUTO: 5.92 M/UL (ref 4–5.4)
RELATIVE EOSINOPHIL (OHS): 3.1 %
RELATIVE LYMPHOCYTE (OHS): 29.3 % (ref 18–48)
RELATIVE MONOCYTE (OHS): 9.7 % (ref 4–15)
RELATIVE NEUTROPHIL (OHS): 56.4 % (ref 38–73)
SODIUM SERPL-SCNC: 138 MMOL/L (ref 136–145)
TRIGL SERPL-MCNC: 135 MG/DL (ref 30–150)
TSH SERPL-ACNC: 2.05 UIU/ML (ref 0.4–4)
WBC # BLD AUTO: 8.36 K/UL (ref 3.9–12.7)

## 2025-05-26 PROCEDURE — 84443 ASSAY THYROID STIM HORMONE: CPT

## 2025-05-26 PROCEDURE — 82465 ASSAY BLD/SERUM CHOLESTEROL: CPT

## 2025-05-26 PROCEDURE — 80053 COMPREHEN METABOLIC PANEL: CPT

## 2025-05-26 PROCEDURE — 36415 COLL VENOUS BLD VENIPUNCTURE: CPT | Mod: PO

## 2025-05-26 PROCEDURE — 83036 HEMOGLOBIN GLYCOSYLATED A1C: CPT

## 2025-05-26 PROCEDURE — 85025 COMPLETE CBC W/AUTO DIFF WBC: CPT

## 2025-05-30 DIAGNOSIS — G47.00 INSOMNIA, UNSPECIFIED TYPE: ICD-10-CM

## 2025-05-30 RX ORDER — TRAZODONE HYDROCHLORIDE 50 MG/1
50 TABLET ORAL NIGHTLY
Qty: 90 TABLET | Refills: 0 | Status: SHIPPED | OUTPATIENT
Start: 2025-05-30

## 2025-05-30 NOTE — TELEPHONE ENCOUNTER
No care due was identified.  [facility] Embedded Care Due Messages. Reference number: [id number].   5/30/2025 10:07:06 AM CDT

## 2025-07-03 ENCOUNTER — LAB VISIT (OUTPATIENT)
Dept: LAB | Facility: HOSPITAL | Age: 70
End: 2025-07-03
Payer: MEDICARE

## 2025-07-03 DIAGNOSIS — M79.89 LEG SWELLING: ICD-10-CM

## 2025-07-03 DIAGNOSIS — R35.89 EXCESSIVE URINE PRODUCTION: ICD-10-CM

## 2025-07-03 LAB
BNP SERPL-MCNC: 94 PG/ML (ref 0–99)
CRP SERPL-MCNC: 7.3 MG/L
ERYTHROCYTE [SEDIMENTATION RATE] IN BLOOD BY PHOTOMETRIC METHOD: 34 MM/HR
PROCALCITONIN SERPL-MCNC: 0.08 NG/ML

## 2025-07-03 PROCEDURE — 83880 ASSAY OF NATRIURETIC PEPTIDE: CPT

## 2025-07-03 PROCEDURE — 36415 COLL VENOUS BLD VENIPUNCTURE: CPT | Mod: PO

## 2025-07-03 PROCEDURE — 85652 RBC SED RATE AUTOMATED: CPT

## 2025-07-03 PROCEDURE — 84145 PROCALCITONIN (PCT): CPT

## 2025-07-03 PROCEDURE — 86140 C-REACTIVE PROTEIN: CPT

## 2025-07-07 ENCOUNTER — TELEPHONE (OUTPATIENT)
Dept: CARDIOLOGY | Facility: CLINIC | Age: 70
End: 2025-07-07
Payer: MEDICARE

## 2025-07-07 DIAGNOSIS — I89.0 LYMPHEDEMA OF BOTH LOWER EXTREMITIES: Primary | ICD-10-CM

## 2025-07-07 NOTE — TELEPHONE ENCOUNTER
Patient called needing new prescription for inelastic velcro wraps. Order placed and sent to [facility]. Patient provided with number to call them for fitting. Number also provided for [employer] as she is experiencing issues with her pump. Patient placed on wait list for appointment as no appointments currently available for scheduling.

## 2025-07-08 ENCOUNTER — HOSPITAL ENCOUNTER (OUTPATIENT)
Dept: RADIOLOGY | Facility: HOSPITAL | Age: 70
Discharge: HOME | End: 2025-07-08
Attending: NURSE PRACTITIONER
Payer: MEDICARE

## 2025-07-08 VITALS — WEIGHT: 293 LBS | HEIGHT: 66 IN | BODY MASS INDEX: 47.09 KG/M2

## 2025-07-08 DIAGNOSIS — Z12.31 ENCOUNTER FOR SCREENING MAMMOGRAM FOR BREAST CANCER: ICD-10-CM

## 2025-07-08 PROCEDURE — 77067 SCR MAMMO BI INCL CAD: CPT | Mod: TC

## 2025-07-08 PROCEDURE — 77063 BREAST TOMOSYNTHESIS BI: CPT | Mod: 26,,, | Performed by: RADIOLOGY

## 2025-07-08 PROCEDURE — 77067 SCR MAMMO BI INCL CAD: CPT | Mod: 26,,, | Performed by: RADIOLOGY

## 2025-07-09 ENCOUNTER — OFFICE VISIT (OUTPATIENT)
Dept: CARDIOLOGY | Facility: CLINIC | Age: 70
End: 2025-07-09
Payer: MEDICARE

## 2025-07-09 VITALS
WEIGHT: 293 LBS | HEIGHT: 66 IN | DIASTOLIC BLOOD PRESSURE: 70 MMHG | SYSTOLIC BLOOD PRESSURE: 101 MMHG | HEART RATE: 73 BPM | BODY MASS INDEX: 47.09 KG/M2

## 2025-07-09 DIAGNOSIS — E78.00 PURE HYPERCHOLESTEROLEMIA: ICD-10-CM

## 2025-07-09 DIAGNOSIS — G47.33 OSA ON CPAP: ICD-10-CM

## 2025-07-09 DIAGNOSIS — I10 ESSENTIAL HYPERTENSION: ICD-10-CM

## 2025-07-09 DIAGNOSIS — I87.2 VENOUS STASIS DERMATITIS OF BOTH LOWER EXTREMITIES: ICD-10-CM

## 2025-07-09 DIAGNOSIS — M79.89 LEG SWELLING: ICD-10-CM

## 2025-07-09 DIAGNOSIS — E66.01 MORBID OBESITY WITH BMI OF 60.0-69.9, ADULT: ICD-10-CM

## 2025-07-09 DIAGNOSIS — I89.0 LYMPHEDEMA OF BOTH LOWER EXTREMITIES: Primary | ICD-10-CM

## 2025-07-09 DIAGNOSIS — R07.89 OTHER CHEST PAIN: ICD-10-CM

## 2025-07-09 DIAGNOSIS — I70.0 AORTIC ATHEROSCLEROSIS: ICD-10-CM

## 2025-07-09 PROCEDURE — 99215 OFFICE O/P EST HI 40 MIN: CPT | Mod: S$PBB,,, | Performed by: INTERNAL MEDICINE

## 2025-07-09 PROCEDURE — 99214 OFFICE O/P EST MOD 30 MIN: CPT | Mod: PBBFAC,PO | Performed by: INTERNAL MEDICINE

## 2025-07-09 PROCEDURE — 99999 PR PBB SHADOW E&M-EST. PATIENT-LVL IV: CPT | Mod: PBBFAC,,, | Performed by: INTERNAL MEDICINE

## 2025-07-09 NOTE — PATIENT INSTRUCTIONS
Assessment/Plan:  Joy Singer is a 69 y.o. female with HLD, LIZETH, morbid obesity, who presents for a scheduled appointment.    Chest Pain- Pt with risk factors for CAD. Check dobutamine stress echo with doppler.     2. Lymphedema- Stable. Continue graduated compression hose.  Limit sodium intake to 2000 mg daily.  Limit volume intake to 1.5 L daily.  Elevate legs when resting.    3. HLD- Continue pravasatin 40 mg daily.    4. Morbid Obesity- Encourage diet, exercise, and weight loss.    Will call pt with results of stress test  Otherwise, follow up in 4 months

## 2025-07-09 NOTE — PROGRESS NOTES
[facility]      Chief Complaint   Patient presents with    lymphedema       Patient ID: Joy Singer is a 69 y.o. female with HLD, LIZETH, morbid obesity, who presents for a scheduled appointment.  Pertinent history/events are as follows:     -Pt kindly referred by Dr. Mccallum for evaluation of lymphedema.    -At our initial clinic visit on 2/19/2024, Mrs. Colorado reported leg swelling for several years.  She reports no claudication symptoms.    Plan:  Lymphedema- Check BLE venous reflux study and PRATIBHA study.  Treat with doxycycline 100 mg bid for 10 days.  Refer to lymphedema clinic.  Recommend wearing graduated compression hose.  Limit sodium intake to 2000 mg daily.  Limit volume intake to 1.5 L daily.  Elevate legs when resting.  HLD- Continue pravasatin 40 mg daily.  Morbid Obesity- Encourage diet, exercise, and weight loss.    HPI:  Mrs. Colorado reports occasional chest pain which stared 1 month ago. States leg swelling is stable. She has no claudication symptoms.  Toe Pressure Study on 2/28/2024 revealed resting PRATIBHA's are normal. Waveforms are normal. BNP 94 on 7/3/2025. EKG on 7/3/2025 sinus rhythm with Premature ventricular complexes/abnormal R wave progression in the precordial leads/Leftward axis     Past Medical History:   Diagnosis Date    BRCA1 negative     BRCA2 negative     Breast cyst     Carotid artery occlusion     Chronic back pain     Chronic bilateral low back pain without sciatica 11/08/2016    Colon polyps 2016    COVID-19 09/27/2022    Fibrocystic breast     HA (headache)     Hyperlipidemia     Morbid obesity with BMI of 60.0-69.9, adult     Obstructive sleep apnea (adult) (pediatric)     Pseudotumor cerebri      Past Surgical History:   Procedure Laterality Date    BLADDER SUSPENSION  2004    BREAST BIOPSY Bilateral 2003 and 2011    Core bx's, benign    BREAST CYST ASPIRATION      BREAST CYST EXCISION      CARPAL TUNNEL RELEASE  2000    COLONOSCOPY  2008 and 2011    eye  growth removal  2010    FRACTURE SURGERY      HYSTERECTOMY  2004    OOPHORECTOMY      rotator cuff surgery  2011    left shoulder    toselectomy  1962     Social History     Socioeconomic History    Marital status:    Occupational History    Occupation: Retired    Tobacco Use    Smoking status: Never     Passive exposure: Never    Smokeless tobacco: Never   Substance and Sexual Activity    Alcohol use: Yes     Comment: social once or twice a year    Drug use: No    Sexual activity: Yes     Partners: Male     Birth control/protection: None     Comment: socially   Social History Narrative    She is caregiver for her      Social Drivers of Health     Financial Resource Strain: Low Risk  (4/14/2025)    Overall Financial Resource Strain (CARDIA)     Difficulty of Paying Living Expenses: Not very hard   Food Insecurity: No Food Insecurity (4/14/2025)    Hunger Vital Sign     Worried About Running Out of Food in the Last Year: Never true     Ran Out of Food in the Last Year: Never true   Transportation Needs: No Transportation Needs (4/14/2025)    PRAPARE - Transportation     Lack of Transportation (Medical): No     Lack of Transportation (Non-Medical): No   Physical Activity: Inactive (4/14/2025)    Exercise Vital Sign     Days of Exercise per Week: 0 days     Minutes of Exercise per Session: 0 min   Stress: No Stress Concern Present (4/14/2025)    [employer] - Occupational Stress Questionnaire     Feeling of Stress : Not at all   Housing Stability: Low Risk  (4/14/2025)    Housing Stability Vital Sign     Unable to Pay for Housing in the Last Year: No     Number of Times Moved in the Last Year: 0     Homeless in the Last Year: No     Family History   Problem Relation Name Age of Onset    Emphysema Mother      Lung cancer Mother      Hypertension Father      Heart failure Father      Diabetes Father      Emphysema Father      Heart failure Sister      Valvular heart  disease Sister      Breast cancer Maternal Aunt [place]         50s    Breast cancer Maternal Cousin  68        daughter of aunt dx'd in 50s    Breast cancer Maternal Cousin  64        mat 1st cousin, mother unaffected, Gianni with [employer] 2017 was negative    Cancer Paternal Grandmother          brain cancer    Breast cancer Maternal Aunt Von 68    Lung cancer Maternal Aunt      Lung cancer Maternal Uncle      Lung cancer Maternal Uncle      Lung cancer Maternal Uncle      Lung cancer Maternal Uncle          in addition to mesothelioma    Breast cancer Maternal Cousin          1st cousin, daughter of unaffected aunt    Breast cancer Maternal Cousin          mother's brother's daughter    Ovarian cancer Neg Hx         Review of patient's allergies indicates:   Allergen Reactions    Insect venom Edema, Itching and Swelling    Adhesive tape-silicones Hives    Oxycodone-acetaminophen Itching and Other (See Comments)    Lidocaine Itching and Rash    Unable to assess Rash     Insect Bites    Venom-honey bee Rash and Other (See Comments)       Medication List with Changes/Refills   Current Medications    ASCORBIC ACID, VITAMIN C, 500 MG TBSR    Take 1 tablet by mouth once daily.    CALCIUM CARBONATE-VITAMIN D3 600 MG(1,500MG) -500 UNIT CAP    Calcium 600 with Vitamin D3 600 mg (1,500 mg)-500 unit capsule   Take 1 capsule twice a day by oral route.    CARISOPRODOL (SOMA) 350 MG TABLET    Take 350 mg by mouth continuous prn.    CLOTRIMAZOLE-BETAMETHASONE 1-0.05% (LOTRISONE) CREAM    Apply topically 2 (two) times daily.    CRANBERRY FRUIT EXTRACT (CRANBERRY EXTRACT ORAL)    Take 2 capsules by mouth once daily.    DIPHENHYDRAMINE (BENADRYL) 25 MG CAPSULE    Take 25 mg by mouth every 6 (six) hours as needed for Itching.    DOCUSATE SODIUM (COLACE) 100 MG CAPSULE    Colace 100 mg capsule   Take 2 capsule twice a day by oral route.    FOLIC ACID (FOLVITE) 400 MCG TABLET    Take 400 mcg by mouth once daily.     HYDROCODONE-ACETAMINOPHEN 7.5-325MG (NORCO) 7.5-325 MG PER TABLET    TK 1 T PO Q 6 TO 8 H PRN    HYDROCORTISONE (ANUSOL-HC) 2.5 % RECTAL CREAM    Place rectally 2 (two) times daily.    KETOCONAZOLE (NIZORAL) 2 % SHAMPOO    every 30 days.    KETOCONAZOLE 2 % FOAM    Extina 2 % topical foam as needed    LACTOBAC NO.51/BIFIDOBACT NO.4 (UP4 PROBIOTICS ULTRA ORAL)    Take 1 capsule by mouth once daily.    LOSARTAN (COZAAR) 100 MG TABLET    Take 1 tablet (100 mg total) by mouth once daily.    MIRABEGRON (MYRBETRIQ) 25 MG TB24 ER TABLET    Take 1 tablet (25 mg total) by mouth once daily.    MULTIVITAMIN-IRON-FOLIC ACID (CENTRUM) TAB    Take 1 tablet by mouth once daily.    NYSTATIN (MYCOSTATIN) POWDER    Apply topically 4 (four) times daily. As needed for rash    POLYETHYLENE GLYCOL (GLYCOLAX) 17 GRAM/DOSE POWDER    Take 17 g by mouth once daily.    PRAVASTATIN (PRAVACHOL) 40 MG TABLET    Take 1 tablet (40 mg total) by mouth once daily.    TRAZODONE (DESYREL) 50 MG TABLET    Take 1 tablet (50 mg total) by mouth every evening.    VITAMIN E 1000 UNIT CAPSULE    Take 1,000 Units by mouth once daily.   Discontinued Medications    FLUCONAZOLE (DIFLUCAN) 100 MG TABLET    Take 1 tablet (100 mg total) by mouth once daily. for 7 days    NITROFURANTOIN, MACROCRYSTAL-MONOHYDRATE, (MACROBID) 100 MG CAPSULE    Take 1 capsule (100 mg total) by mouth 2 (two) times daily.       Review of Systems  Constitution: Denies chills, fever, and sweats.  HENT: Denies headaches or blurry vision.  Cardiovascular: Denies chest pain or irregular heart beat.  Respiratory: Denies cough or shortness of breath.  Gastrointestinal: Denies abdominal pain, nausea, or vomiting.  Musculoskeletal: Denies muscle cramps.  Neurological: Denies dizziness or focal weakness.  Psychiatric/Behavioral: Normal mental status.  Hematologic/Lymphatic: Denies bleeding problem or easy bruising/bleeding.  Skin: Denies rash or suspicious lesions    Physical Examination  /70  "  Pulse 73   Ht 5' 6" (1.676 m)   Wt (!) 189.3 kg (417 lb 5.3 oz)   LMP 01/01/2004   BMI 67.36 kg/m²     Constitutional: No acute distress, conversant  HEENT: Sclera anicteric, Pupils equal, round and reactive to light, extraocular motions intact, Oropharynx clear  Neck: No JVD, no carotid bruits  Cardiovascular: regular rate and rhythm, no murmur, rubs or gallops, normal S1/S2  Pulmonary: Clear to auscultation bilaterally  Abdominal: Abdomen soft, nontender, nondistended, positive bowel sounds  Extremities: No lower extremity edema,   Pulses:  Carotid pulses are 2+ on the right side, and 2+ on the left side.  Radial pulses are 2+ on the right side, and 2+ on the left side.   Femoral pulses are 2+ on the right side, and 2+ on the left side.  Popliteal pulses are 2+ on the right side, and 2+ on the left side.   Dorsalis pedis pulses are 2+ on the right side, and 2+ on the left side.   Posterior tibial pulses are 2+ on the right side, and 2+ on the left side.    Skin: No ecchymosis, erythema, or ulcers  Psych: Alert and oriented x 3, appropriate affect  Neuro: CNII-XII intact, no focal deficits    Labs:  Most Recent Data  CBC:   Lab Results   Component Value Date    WBC 8.36 05/26/2025    HGB 14.0 05/26/2025    HCT 46.7 05/26/2025     (L) 05/26/2025    MCV 79 (L) 05/26/2025    RDW 17.1 (H) 05/26/2025     BMP:   Lab Results   Component Value Date     05/26/2025    K 4.5 05/26/2025     05/26/2025    CO2 22 (L) 05/26/2025    BUN 14 05/26/2025    CREATININE 0.8 05/26/2025     (H) 05/26/2025    CALCIUM 8.9 05/26/2025     LFTS;   Lab Results   Component Value Date    PROT 7.1 05/26/2025    ALBUMIN 3.4 (L) 05/26/2025    BILITOT 0.4 05/26/2025    AST 37 05/26/2025    ALKPHOS 66 05/26/2025    ALT 31 05/26/2025     COAGS: No results found for: "INR", "PROTIME", "PTT"  FLP:   Lab Results   Component Value Date    CHOL 152 05/26/2025    HDL 43 05/26/2025    LDLCALC 82.0 05/26/2025    TRIG 135 " 05/26/2025    CHOLHDL 28.3 05/26/2025     CARDIAC:   Lab Results   Component Value Date    BNP 94 07/03/2025       Imaging:    EKG 7/3/2025:  Sinus rhythm with Premature ventricular complexes  Abnormal R wave progression in the precordial leads  Leftward axis     Toe Pressure Study 2/28/2024:   Resting PRATIBHA's are normal. Waveforms are normal.     Echo 11/8/2018:  Left ventricle ejection fraction is normal at 68%  Left ventricle shows concentric remodeling.  Normal LV diastolic function.  RV systolic function is normal.  Right atrium is mildly dilated.  Trace tricuspid regurgitation.  Normal central venous pressure (3 mm Hg).  The estimated PA systolic pressure is 31.30 mm Hg    Assessment/Plan:  Joy Singer is a 69 y.o. female with HLD, LIZETH, morbid obesity, who presents for a scheduled appointment.    Chest Pain- Pt with risk factors for CAD. Check dobutamine stress echo with doppler.     2. Lymphedema- Stable. Continue graduated compression hose.  Limit sodium intake to 2000 mg daily.  Limit volume intake to 1.5 L daily.  Elevate legs when resting.    3. HLD- Continue pravasatin 40 mg daily.    4. Morbid Obesity- Encourage diet, exercise, and weight loss.    Will call pt with results of stress test  Otherwise, follow up in 4 months     Total duration of face to face visit time 20 minutes.  Total time spent counseling greater than fifty percent of total visit time.  Counseling included discussion regarding imaging findings, diagnosis, possibilities, treatment options, risks and benefits.  The patient had many questions regarding the options and long-term effects.    Andrew Driver MD, PhD  Interventional Cardiology

## 2025-07-11 ENCOUNTER — PATIENT MESSAGE (OUTPATIENT)
Dept: INTERNAL MEDICINE | Facility: CLINIC | Age: 70
End: 2025-07-11
Payer: MEDICARE

## 2025-07-11 DIAGNOSIS — Z87.442 HISTORY OF KIDNEY STONES: ICD-10-CM

## 2025-07-11 DIAGNOSIS — R31.9 HEMATURIA, UNSPECIFIED TYPE: Primary | ICD-10-CM

## 2025-07-11 RX ORDER — TAMSULOSIN HYDROCHLORIDE 0.4 MG/1
0.4 CAPSULE ORAL DAILY
Qty: 7 CAPSULE | Refills: 0 | Status: SHIPPED | OUTPATIENT
Start: 2025-07-11 | End: 2025-07-18

## 2025-07-11 NOTE — TELEPHONE ENCOUNTER
Hold Myrbetriq.Flomax 0.4mg daily due to hematuria and suspected kidney stones.    Urology referral.     Schedule UA. Patient will respond if she agrees to a CT.

## 2025-07-14 ENCOUNTER — PATIENT MESSAGE (OUTPATIENT)
Dept: INTERNAL MEDICINE | Facility: CLINIC | Age: 70
End: 2025-07-14
Payer: MEDICARE

## 2025-07-14 ENCOUNTER — TELEPHONE (OUTPATIENT)
Dept: INTERNAL MEDICINE | Facility: CLINIC | Age: 70
End: 2025-07-14
Payer: MEDICARE

## 2025-07-14 DIAGNOSIS — Z87.442 HISTORY OF KIDNEY STONES: ICD-10-CM

## 2025-07-14 NOTE — TELEPHONE ENCOUNTER
Need stat ct/ and urine scheduled asap.    I called and told her I was going to talk to   Referral coordinator at .

## 2025-07-14 NOTE — TELEPHONE ENCOUNTER
Copied from CRM #[id number]. Topic: General Inquiry - Patient Advice  >> Jul 14, 2025 11:02 AM Amarilis wrote:  .1MEDICALADVICE     Patient is calling for Medical Advice regarding: patient states need a call back about bloodwork and CT.     How long has patient had these symptoms: N/A     Pharmacy name and phone#: n/a    Patient wants a call back or thru [facility], provide patient's call back phone number: Patient Phone [phone].    Comments:    Please advise patient replies from provider may take up to 48 hours.

## 2025-07-14 NOTE — TELEPHONE ENCOUNTER
No care due was identified.  [facility] Embedded Care Due Messages. Reference number: [id number].   7/14/2025 12:00:43 PM CDT

## 2025-07-15 ENCOUNTER — HOSPITAL ENCOUNTER (OUTPATIENT)
Dept: RADIOLOGY | Facility: OTHER | Age: 70
Discharge: HOME | End: 2025-07-15
Attending: INTERNAL MEDICINE
Payer: MEDICARE

## 2025-07-15 DIAGNOSIS — R31.9 HEMATURIA, UNSPECIFIED TYPE: ICD-10-CM

## 2025-07-15 DIAGNOSIS — Z87.442 HISTORY OF KIDNEY STONES: ICD-10-CM

## 2025-07-15 PROCEDURE — 74176 CT ABD & PELVIS W/O CONTRAST: CPT | Mod: TC

## 2025-07-15 PROCEDURE — 74176 CT ABD & PELVIS W/O CONTRAST: CPT | Mod: 26,,, | Performed by: RADIOLOGY

## 2025-07-15 RX ORDER — TAMSULOSIN HYDROCHLORIDE 0.4 MG/1
CAPSULE ORAL
Qty: 30 CAPSULE | Refills: 0 | Status: SHIPPED | OUTPATIENT
Start: 2025-07-15

## 2025-07-15 NOTE — TELEPHONE ENCOUNTER
No care due was identified.  [facility] Embedded Care Due Messages. Reference number: [id number].   7/15/2025 5:58:33 PM CDT

## 2025-07-15 NOTE — TELEPHONE ENCOUNTER
Refill Routing Note   Medication(s) are not appropriate for processing by [facility] for the following reason(s):        New or recently adjusted medication    [facility] action(s):  Defer               Appointments  past 12m or future 3m with PCP    Date Provider   Last Visit   7/3/2025 Faiza Kuhn MD   Next Visit   7/14/2025 Faiza Kuhn MD   ED visits in past 90 days: 0        Note composed:11:27 AM 07/15/2025

## 2025-07-16 RX ORDER — TAMSULOSIN HYDROCHLORIDE 0.4 MG/1
CAPSULE ORAL
Qty: 90 CAPSULE | OUTPATIENT
Start: 2025-07-16

## 2025-07-16 NOTE — TELEPHONE ENCOUNTER
Refill Decision Note   Joy Singer  is requesting a refill authorization.  Brief Assessment and Rationale for Refill:  Quick Discontinue     Medication Therapy Plan: E-Prescribing Status: Receipt confirmed by pharmacy (7/15/2025  4:21 PM CDT)      Comments:     Note composed:7:34 AM 07/16/2025

## 2025-07-18 ENCOUNTER — TELEPHONE (OUTPATIENT)
Dept: UROLOGY | Facility: CLINIC | Age: 70
End: 2025-07-18
Payer: MEDICARE

## 2025-07-18 ENCOUNTER — OFFICE VISIT (OUTPATIENT)
Dept: UROLOGY | Facility: CLINIC | Age: 70
End: 2025-07-18
Payer: MEDICARE

## 2025-07-18 VITALS
HEART RATE: 89 BPM | DIASTOLIC BLOOD PRESSURE: 85 MMHG | BODY MASS INDEX: 67.36 KG/M2 | WEIGHT: 293 LBS | SYSTOLIC BLOOD PRESSURE: 124 MMHG

## 2025-07-18 DIAGNOSIS — R31.9 HEMATURIA, UNSPECIFIED TYPE: ICD-10-CM

## 2025-07-18 DIAGNOSIS — Z87.442 HISTORY OF KIDNEY STONES: ICD-10-CM

## 2025-07-18 DIAGNOSIS — R30.0 DYSURIA: Primary | ICD-10-CM

## 2025-07-18 PROCEDURE — 99999 PR PBB SHADOW E&M-EST. PATIENT-LVL IV: CPT | Mod: PBBFAC,,,

## 2025-07-18 PROCEDURE — 99214 OFFICE O/P EST MOD 30 MIN: CPT | Mod: PBBFAC

## 2025-07-18 RX ORDER — SULFAMETHOXAZOLE AND TRIMETHOPRIM 800; 160 MG/1; MG/1
1 TABLET ORAL 2 TIMES DAILY
Qty: 10 TABLET | Refills: 0 | Status: SHIPPED | OUTPATIENT
Start: 2025-07-18 | End: 2025-07-23

## 2025-07-18 NOTE — TELEPHONE ENCOUNTER
Called pt, pt states she is overweight and has trouble giving sample in office. Pt asked if she can give sample collected from home. Informed pt that would be ok. Gave pt location details    Copied from CRM #[id number]. Topic: General Inquiry - Patient Advice  >> 2025  9:29 AM Ladonna wrote:  Pt is requesting a  call from someone in the office and is asking for a return call back soon. Thanks.     Reason for call: discuss if urine is needed for today's appt       Patient's DX:     Patient requesting call back or [facility] [phone]

## 2025-07-18 NOTE — PROGRESS NOTES
[facility]      New Hematuria/Urolithiasis Note    7/18/2025    Referred by:  Faiza Kuhn MD    History of Present Illness    CHIEF COMPLAINT:  Patient presents today for evaluation of kidney stones and hematuria.     KIDNEY STONES:  She has a history of kidney stones since 2015. CT reveals large kidney stones in each kidney, which have grown from 2022 to 2025. She currently experiences no pain and denies obstructive symptoms. She reports symptoms consistent with urinary tract infection, including burning sensation, pain, and blood in urine. She is seeking guidance on management of kidney stones.     Upper tract imaging reviewed today:  Stone Protocol CT 2025:  Urolithiasis -  Right kidney (number 1; largest 1.4 cm) and Left kidney (number 1, largest 1.6 cm)  CT Urogram 2022: Urolithiasis -  Right kidney (number 1; largest 7 mm) and Left kidney (number 1; largest 10 mm)    CURRENT UTI:  She reports current urinary tract infection with recent positive culture and is currently passing blood in urine. She has a history of recurrent urinary tract infections. She is not currently on antibiotics for this infection and understands that the blood in her urine may be related to the current urinary tract infection. Culture has only just resulted today.     WEIGHT MANAGEMENT:  She reports ongoing weight management efforts with current BMI of 67, down from previous BMI of 69. She recently lost 12 lbs through dietary modifications and is interested in weight loss interventions. She acknowledges the importance of weight reduction for overall health and surgical candidacy.    ALLERGIES:  Allergies include adhesive, lidocaine, insect venoms, and oxycodone. Oxycodone causes significant itching reaction.       A review of 10+ systems was conducted with pertinent positive and negative findings documented in HPI with all other systems reviewed and negative.    Past medical, family, surgical and social history  reviewed as documented in chart with pertinent positive medical, family, surgical and social history detailed in HPI.    Exam Findings:    Const: no acute distress, conversant and alert  Eyes: anicteric, extraocular muscles intact  ENMT: normocephalic, Nl oral membranes  Cardio: no cyanosis, nl cap refill  Pulm: no tachypnea; no resp distress  Abd: soft, no tenderness  Musc: no laceration, no tenderness  Neuro: alert; oriented x 3  Skin: warm, dry; no petichiae  Psych: no anxiety; normal speech       Assessment/Plan:    Assessment & Plan    IMPRESSION:  - Stones not causing obstruction or significant pain.  - Current symptoms (blood in urine, dysuria) attributed to UTI rather than kidney stones.  - BMI (over 50) limiting factor for potential stone treatment options at current facility.  - Weighed risks vs. benefits of stone treatment given health status, BMI, and asymptomatic nature of stones.  - Treating UTI as primary concern and deferring immediate action on kidney stones.  - Will investigate referral options at [facility] for potential kidney stone treatment, considering BMI and stone size.    KIDNEY STONES:  1. Explained kidney stone formation, growth, potential complications, and symptoms that would warrant immediate medical attention.  2. Discussed dietary factors contributing to kidney stone formation, particularly high oxalate foods.  3. Explained potassium citrate's role in stone management and recommend incorporating more into diet, such as using [employer] lemon lime flavor in water.  4. Recommend increasing daily water intake to produce 2-3 L of urine per day.  5. Recommend avoiding high oxalate foods (patient advised to research low oxalate diet online).  6. Consider referral to urologists at [facility] who can treat large kidney stones in patients, however, we had a lengthy discussion about risk vs benefit in regards to preemptive treatment of non-obstructing stones.      URINARY TRACT INFECTION:  1.  Started antibiotic (Bactrim) for 5 days to treat UTI, based on recent culture sensitivity.    OBESITY:  1. Patient to continue weight loss efforts to improve overall health and surgical candidacy.    FOLLOW-UP:  1. Follow up when informed about urologists at [facility] who can treat large kidney stones in patients with high BMI.       I spent a total of 40 minutes on the day of the visit.This includes face to face time and non-face to face time preparing to see the patient (eg, review of tests), obtaining and/or reviewing separately obtained history, documenting clinical information in the electronic or other health record, independently interpreting results and communicating results to the patient/family/caregiver, or care coordinator.          Visit complexity today is associated with medical care services that are part of the ongoing care related to the single serious and/or complex condition of Urolithiasis. A longitudinal relationship exists or is being developed between the patient and this practitioner for the care of this condition.

## 2025-07-21 ENCOUNTER — TELEPHONE (OUTPATIENT)
Dept: UROLOGY | Facility: CLINIC | Age: 70
End: 2025-07-21
Payer: MEDICARE

## 2025-07-21 NOTE — TELEPHONE ENCOUNTER
----- Message from Dinesh Bedoya MD sent at 7/21/2025  9:37 AM CDT -----  I will be happy to see her in clinic.    We can take care of her.    Thx  ----- Message -----  From: Rabia Brink LPN  Sent: 7/18/2025   2:48 PM CDT  To: MD Kat Vidal saw this pt today for b/l stones The CT of the abdomen shows stones measuring 1.6 cm in the left kidney and 1.4 cm in the right kidney. She has a BMI of 67. Can you look at her chart to see if she is a candidate

## 2025-07-25 ENCOUNTER — PATIENT MESSAGE (OUTPATIENT)
Dept: UROLOGY | Facility: CLINIC | Age: 70
End: 2025-07-25
Payer: MEDICARE

## 2025-07-25 ENCOUNTER — OFFICE VISIT (OUTPATIENT)
Dept: INTERNAL MEDICINE | Facility: CLINIC | Age: 70
End: 2025-07-25
Payer: MEDICARE

## 2025-07-25 DIAGNOSIS — N30.01 ACUTE CYSTITIS WITH HEMATURIA: Primary | ICD-10-CM

## 2025-07-25 RX ORDER — CIPROFLOXACIN 500 MG/1
500 TABLET, FILM COATED ORAL 2 TIMES DAILY
Qty: 14 TABLET | Refills: 0 | Status: SHIPPED | OUTPATIENT
Start: 2025-07-25 | End: 2025-08-01

## 2025-07-25 NOTE — PROGRESS NOTES
"Subjective:       Patient ID: Joy Singer is a 70 y.o. female.    Chief Complaint: Urinary Tract Infection      Visit type: audiovisual    Joy Singer is a 70 y.o. female who presents today for UTI symptoms.     Patient has a urine and urine culture completed on 7/15 that revealed citrobacter koseri infection with hematuria. Patient was started on 5 days of Bactrim by Urology and completed this course on Wednesday. She notes that during the time she was on the medication her symptoms improved but did not resolve. By today she was experiencing more severe dysuria and hematuria. Hematuria is described as "pink" on her pad.     The patient location is: [place]    Review of patient's allergies indicates:   Allergen Reactions    Insect venom Edema, Itching and Swelling    Adhesive tape-silicones Hives    Oxycodone-acetaminophen Itching and Other (See Comments)    Lidocaine Itching and Rash    Unable to assess Rash     Insect Bites    Venom-honey bee Rash and Other (See Comments)       Medication List with Changes/Refills   New Medications    CIPROFLOXACIN HCL (CIPRO) 500 MG TABLET    Take 1 tablet (500 mg total) by mouth 2 (two) times daily. for 7 days   Current Medications    ASCORBIC ACID, VITAMIN C, 500 MG TBSR    Take 1 tablet by mouth once daily.    CALCIUM CARBONATE-VITAMIN D3 600 MG(1,500MG) -500 UNIT CAP    Calcium 600 with Vitamin D3 600 mg (1,500 mg)-500 unit capsule   Take 1 capsule twice a day by oral route.    CARISOPRODOL (SOMA) 350 MG TABLET    Take 350 mg by mouth continuous prn.    CLOTRIMAZOLE-BETAMETHASONE 1-0.05% (LOTRISONE) CREAM    Apply topically 2 (two) times daily.    CRANBERRY FRUIT EXTRACT (CRANBERRY EXTRACT ORAL)    Take 2 capsules by mouth once daily.    DIPHENHYDRAMINE (BENADRYL) 25 MG CAPSULE    Take 25 mg by mouth every 6 (six) hours as needed for Itching.    DOCUSATE SODIUM (COLACE) 100 MG CAPSULE    Colace 100 mg capsule   Take 2 capsule twice a day by oral route.    FOLIC " ACID (FOLVITE) 400 MCG TABLET    Take 400 mcg by mouth once daily.    HYDROCODONE-ACETAMINOPHEN 7.5-325MG (NORCO) 7.5-325 MG PER TABLET    TK 1 T PO Q 6 TO 8 H PRN    HYDROCORTISONE (ANUSOL-HC) 2.5 % RECTAL CREAM    Place rectally 2 (two) times daily.    KETOCONAZOLE (NIZORAL) 2 % SHAMPOO    every 30 days.    KETOCONAZOLE 2 % FOAM    Extina 2 % topical foam as needed    LACTOBAC NO.51/BIFIDOBACT NO.4 (UP4 PROBIOTICS ULTRA ORAL)    Take 1 capsule by mouth once daily.    LOSARTAN (COZAAR) 100 MG TABLET    Take 1 tablet (100 mg total) by mouth once daily.    MIRABEGRON (MYRBETRIQ) 25 MG TB24 ER TABLET    Take 1 tablet (25 mg total) by mouth once daily.    MULTIVITAMIN-IRON-FOLIC ACID (CENTRUM) TAB    Take 1 tablet by mouth once daily.    NYSTATIN (MYCOSTATIN) POWDER    Apply topically 4 (four) times daily. As needed for rash    POLYETHYLENE GLYCOL (GLYCOLAX) 17 GRAM/DOSE POWDER    Take 17 g by mouth once daily.    PRAVASTATIN (PRAVACHOL) 40 MG TABLET    Take 1 tablet (40 mg total) by mouth once daily.    TAMSULOSIN (FLOMAX) 0.4 MG CAP    TAKE 1 CAPSULE(0.4 MG) BY MOUTH DAILY FOR 7 DAYS    TRAZODONE (DESYREL) 50 MG TABLET    Take 1 tablet (50 mg total) by mouth every evening.    VITAMIN E 1000 UNIT CAPSULE    Take 1,000 Units by mouth once daily.     Review of Systems   Genitourinary:  Positive for dysuria, frequency, hematuria and urgency.     Objective:   [facility] 01/01/2004     Physical Exam  Vitals reviewed: Exam Limited due to Video Consultation.   Constitutional:       General: She is not in acute distress.  Neurological:      Mental Status: She is alert.       Assessment and Plan:       1. Acute cystitis with hematuria  - ciprofloxacin HCl (CIPRO) 500 MG tablet; Take 1 tablet (500 mg total) by mouth 2 (two) times daily. for 7 days  Dispense: 14 tablet; Refill: 0    Patient was prescribed Cipro x 7 days. Advised that should symptoms worsen or return she will need to be evaluated in person and per Urology  recommendations have a straight cath UA culture collected.       Face to Face time with patient: 10  17 minutes of total time spent on the encounter, which includes face to face time and non-face to face time preparing to see the patient (eg, review of tests), Obtaining and/or reviewing separately obtained history, Documenting clinical information in the electronic or other health record, Independently interpreting results (not separately reported) and communicating results to the patient/family/caregiver, or Care coordination (not separately reported).     Each patient to whom he or she provides medical services by telemedicine is:  (1) informed of the relationship between the physician and patient and the respective role of any other health care provider with respect to management of the patient; and (2) notified that he or she may decline to receive medical services by telemedicine and may withdraw from such care at any time.

## 2025-08-01 ENCOUNTER — TELEPHONE (OUTPATIENT)
Dept: INTERNAL MEDICINE | Facility: CLINIC | Age: 70
End: 2025-08-01
Payer: MEDICARE

## 2025-08-01 NOTE — TELEPHONE ENCOUNTER
Copied from CRM #[id number]. Topic: General Inquiry - Patient Advice  >> Aug 1, 2025  8:25 AM Zamzam wrote:  Type:  Needs Medical Advice    Who Called: patient is in waiting room for virtual appointment  SWould the patient rather a call back or a response via [employer]? call  Best Call Back Number: [phone]   Additional Information:

## 2025-08-06 ENCOUNTER — PATIENT MESSAGE (OUTPATIENT)
Dept: INTERNAL MEDICINE | Facility: CLINIC | Age: 70
End: 2025-08-06
Payer: MEDICARE

## 2025-08-13 ENCOUNTER — LAB VISIT (OUTPATIENT)
Dept: LAB | Facility: HOSPITAL | Age: 70
End: 2025-08-13
Attending: INTERNAL MEDICINE
Payer: MEDICARE

## 2025-08-13 DIAGNOSIS — N39.0 RECURRENT URINARY TRACT INFECTION: ICD-10-CM

## 2025-08-13 LAB
ANION GAP (OHS): 8 MMOL/L (ref 8–16)
BUN SERPL-MCNC: 15 MG/DL (ref 8–23)
CALCIUM SERPL-MCNC: 9.6 MG/DL (ref 8.7–10.5)
CHLORIDE SERPL-SCNC: 107 MMOL/L (ref 95–110)
CO2 SERPL-SCNC: 25 MMOL/L (ref 23–29)
CREAT SERPL-MCNC: 0.8 MG/DL (ref 0.5–1.4)
GFR SERPLBLD CREATININE-BSD FMLA CKD-EPI: >60 ML/MIN/1.73/M2
GLUCOSE SERPL-MCNC: 106 MG/DL (ref 70–110)
POTASSIUM SERPL-SCNC: 4.1 MMOL/L (ref 3.5–5.1)
SODIUM SERPL-SCNC: 140 MMOL/L (ref 136–145)

## 2025-08-13 PROCEDURE — 82310 ASSAY OF CALCIUM: CPT

## 2025-08-13 PROCEDURE — 36415 COLL VENOUS BLD VENIPUNCTURE: CPT

## 2025-08-13 RX ORDER — PRAVASTATIN SODIUM 40 MG/1
40 TABLET ORAL
Qty: 90 TABLET | Refills: 3 | Status: SHIPPED | OUTPATIENT
Start: 2025-08-13

## 2025-08-21 ENCOUNTER — OFFICE VISIT (OUTPATIENT)
Dept: UROLOGY | Facility: CLINIC | Age: 70
End: 2025-08-21
Payer: MEDICARE

## 2025-08-21 VITALS
HEART RATE: 76 BPM | DIASTOLIC BLOOD PRESSURE: 88 MMHG | BODY MASS INDEX: 47.09 KG/M2 | SYSTOLIC BLOOD PRESSURE: 143 MMHG | WEIGHT: 293 LBS | HEIGHT: 66 IN

## 2025-08-21 DIAGNOSIS — E66.01 MORBID OBESITY: ICD-10-CM

## 2025-08-21 DIAGNOSIS — N20.0 BILATERAL KIDNEY STONES: Primary | ICD-10-CM

## 2025-08-21 DIAGNOSIS — N39.0 RECURRENT UTI: ICD-10-CM

## 2025-08-21 PROCEDURE — 99215 OFFICE O/P EST HI 40 MIN: CPT | Mod: PBBFAC | Performed by: UROLOGY

## 2025-08-21 PROCEDURE — 99999 PR PBB SHADOW E&M-EST. PATIENT-LVL V: CPT | Mod: PBBFAC,,, | Performed by: UROLOGY

## 2025-08-22 ENCOUNTER — PATIENT MESSAGE (OUTPATIENT)
Dept: UROLOGY | Facility: CLINIC | Age: 70
End: 2025-08-22
Payer: MEDICARE

## 2025-08-22 DIAGNOSIS — N20.1 LEFT URETERAL STONE: ICD-10-CM

## 2025-08-22 DIAGNOSIS — N20.0 BILATERAL KIDNEY STONES: Primary | ICD-10-CM

## 2025-08-25 ENCOUNTER — HOSPITAL ENCOUNTER (OUTPATIENT)
Dept: RADIOLOGY | Facility: HOSPITAL | Age: 70
Discharge: HOME | End: 2025-08-25
Attending: INTERNAL MEDICINE
Payer: MEDICARE

## 2025-08-25 DIAGNOSIS — R10.9 RIGHT SIDED ABDOMINAL PAIN: ICD-10-CM

## 2025-08-25 PROBLEM — R07.89 OTHER CHEST PAIN: Status: RESOLVED | Noted: 2025-07-09 | Resolved: 2025-08-25

## 2025-08-25 PROCEDURE — 76700 US EXAM ABDOM COMPLETE: CPT | Mod: TC

## 2025-08-25 PROCEDURE — 76700 US EXAM ABDOM COMPLETE: CPT | Mod: 26,,, | Performed by: RADIOLOGY

## 2025-08-26 ENCOUNTER — PATIENT MESSAGE (OUTPATIENT)
Dept: UROLOGY | Facility: CLINIC | Age: 70
End: 2025-08-26
Payer: MEDICARE

## 2025-08-26 DIAGNOSIS — N20.0 BILATERAL KIDNEY STONES: Primary | ICD-10-CM

## 2025-08-26 DIAGNOSIS — N20.1 LEFT URETERAL STONE: Primary | ICD-10-CM

## 2025-08-26 DIAGNOSIS — N20.1 LEFT URETERAL STONE: ICD-10-CM

## 2025-08-26 DIAGNOSIS — N20.0 BILATERAL KIDNEY STONES: ICD-10-CM

## 2025-09-03 ENCOUNTER — HOSPITAL ENCOUNTER (OUTPATIENT)
Dept: CARDIOLOGY | Facility: HOSPITAL | Age: 70
Discharge: HOME | End: 2025-09-03
Attending: INTERNAL MEDICINE
Payer: MEDICARE

## 2025-09-03 VITALS
WEIGHT: 293 LBS | SYSTOLIC BLOOD PRESSURE: 145 MMHG | RESPIRATION RATE: 16 BRPM | DIASTOLIC BLOOD PRESSURE: 84 MMHG | HEART RATE: 59 BPM | HEIGHT: 66 IN | BODY MASS INDEX: 47.09 KG/M2

## 2025-09-03 DIAGNOSIS — R07.89 OTHER CHEST PAIN: ICD-10-CM

## 2025-09-03 LAB
AORTIC SIZE INDEX (SOV): 1.4 CM/M2
AORTIC SIZE INDEX: 1.3 CM/M2
ASCENDING AORTA: 3.6 CM
AV AREA BY CONTINUOUS VTI: 3.3 CM2
AV INDEX (PROSTH): 0.77
AV LVOT MEAN GRADIENT: 4 MMHG
AV LVOT PEAK GRADIENT: 5 MMHG
AV MEAN GRADIENT: 5 MMHG
AV PEAK GRADIENT: 9 MMHG
AV VALVE AREA BY VELOCITY RATIO: 3.6 CM²
AV VALVE AREA: 3.5 CM2
AV VELOCITY RATIO: 0.8
BSA FOR ECHO PROCEDURE: 2.91 M2
CV ECHO LV RWT: 0.42 CM
CV STRESS BASE HR: 58 BPM
DIASTOLIC BLOOD PRESSURE: 84 MMHG
DOP CALC AO PEAK VEL: 1.5 M/S
DOP CALC AO VTI: 30.7 CM
DOP CALC LVOT AREA: 4.5 CM2
DOP CALC LVOT DIAMETER: 2.4 CM
DOP CALC LVOT PEAK VEL: 1.2 M/S
DOP CALCLVOT PEAK VEL VTI: 23.5 CM
E WAVE DECELERATION TIME: 269 MS
E/A RATIO: 0.84
E/E' RATIO: 8 M/S
ECHO EF ESTIMATED: 46 %
ECHO LV POSTERIOR WALL: 0.9 CM (ref 0.6–1.1)
FRACTIONAL SHORTENING: 23.3 % (ref 28–44)
INTERVENTRICULAR SEPTUM: 0.9 CM (ref 0.6–1.1)
LA MAJOR: 6.3 CM
LA WIDTH: 4.3 CM
LEFT ATRIUM SIZE: 3.9 CM
LEFT ATRIUM VOLUME INDEX MOD: 26 ML/M2
LEFT ATRIUM VOLUME MOD: 70 ML
LEFT INTERNAL DIMENSION IN SYSTOLE: 3.3 CM (ref 2.1–4)
LEFT VENTRICLE DIASTOLIC VOLUME INDEX: 31.23 ML/M2
LEFT VENTRICLE DIASTOLIC VOLUME: 84 ML
LEFT VENTRICLE MASS INDEX: 45.8 G/M2
LEFT VENTRICLE SYSTOLIC VOLUME INDEX: 16.7 ML/M2
LEFT VENTRICLE SYSTOLIC VOLUME: 45 ML
LEFT VENTRICULAR INTERNAL DIMENSION IN DIASTOLE: 4.3 CM (ref 3.5–6)
LEFT VENTRICULAR MASS: 123.3 G
LV LATERAL E/E' RATIO: 7.6
LV SEPTAL E/E' RATIO: 9.5
Lab: 2.1 CM/M
Lab: 2.2 CM/M
MV PEAK A VEL: 0.91 M/S
MV PEAK E VEL: 0.76 M/S
OHS CV CPX 1 MINUTE RECOVERY HEART RATE: 133 BPM
OHS CV CPX 85 PERCENT MAX PREDICTED HEART RATE MALE: 128
OHS CV CPX MAX PREDICTED HEART RATE: 151
OHS CV CPX PATIENT HEIGHT IN: 66
OHS CV CPX PATIENT IS FEMALE: 1
OHS CV CPX PATIENT IS MALE: 0
OHS CV CPX PEAK DIASTOLIC BLOOD PRESSURE: 89 MMHG
OHS CV CPX PEAK HEAR RATE: 133 BPM
OHS CV CPX PEAK RATE PRESSURE PRODUCT: ABNORMAL
OHS CV CPX PEAK SYSTOLIC BLOOD PRESSURE: 137 MMHG
OHS CV CPX PERCENT MAX PREDICTED HEART RATE ACHIEVED: 92
OHS CV CPX RATE PRESSURE PRODUCT PRESENTING: 8410
OHS CV RV/LV RATIO: 1 CM
RA MAJOR: 6.58 CM
RA PRESSURE ESTIMATED: 3 MMHG
RA WIDTH: 4.9 CM
RIGHT ATRIAL AREA: 25 CM2
RIGHT VENTRICLE DIASTOLIC BASEL DIMENSION: 4.3 CM
RV TISSUE DOPPLER FREE WALL SYSTOLIC VELOCITY 1 (APICAL 4 CHAMBER VIEW): 12.98 CM/S
SINUS: 3.7 CM
STJ: 3.3 CM
SYSTOLIC BLOOD PRESSURE: 145 MMHG
TDI LATERAL: 0.1 M/S
TDI SEPTAL: 0.08 M/S
TDI: 0.09 M/S
TRICUSPID ANNULAR PLANE SYSTOLIC EXCURSION: 2.3 CM
Z-SCORE OF LEFT VENTRICULAR DIMENSION IN END DIASTOLE: -17.49
Z-SCORE OF LEFT VENTRICULAR DIMENSION IN END SYSTOLE: -11.62

## 2025-09-03 PROCEDURE — 93325 DOPPLER ECHO COLOR FLOW MAPG: CPT

## 2025-09-03 PROCEDURE — 93351 STRESS TTE COMPLETE: CPT | Mod: 26,,, | Performed by: INTERNAL MEDICINE

## 2025-09-03 PROCEDURE — 25500020 PHARM REV CODE 255: Performed by: INTERNAL MEDICINE

## 2025-09-03 PROCEDURE — 63600175 PHARM REV CODE 636 W HCPCS: Performed by: INTERNAL MEDICINE

## 2025-09-03 PROCEDURE — 93320 DOPPLER ECHO COMPLETE: CPT | Mod: 26,,, | Performed by: INTERNAL MEDICINE

## 2025-09-03 PROCEDURE — 93325 DOPPLER ECHO COLOR FLOW MAPG: CPT | Mod: 26,,, | Performed by: INTERNAL MEDICINE

## 2025-09-03 RX ORDER — DOBUTAMINE HYDROCHLORIDE 400 MG/100ML
40 INJECTION, SOLUTION INTRAVENOUS
Status: COMPLETED | OUTPATIENT
Start: 2025-09-03 | End: 2025-09-03

## 2025-09-03 RX ORDER — ATROPINE SULFATE 0.1 MG/ML
1 INJECTION INTRAVENOUS
Status: DISCONTINUED | OUTPATIENT
Start: 2025-09-03 | End: 2025-09-03

## 2025-09-03 RX ADMIN — DOBUTAMINE 40 MCG/KG/MIN: 12.5 INJECTION, SOLUTION, CONCENTRATE INTRAVENOUS at 09:09

## 2025-09-03 RX ADMIN — HUMAN ALBUMIN MICROSPHERES AND PERFLUTREN 0.66 MG: 10; .22 INJECTION, SOLUTION INTRAVENOUS at 09:09
